# Patient Record
Sex: FEMALE | Race: WHITE | Employment: OTHER | URBAN - METROPOLITAN AREA
[De-identification: names, ages, dates, MRNs, and addresses within clinical notes are randomized per-mention and may not be internally consistent; named-entity substitution may affect disease eponyms.]

---

## 2019-02-25 ENCOUNTER — HOSPITAL ENCOUNTER (EMERGENCY)
Age: 29
Discharge: HOME | End: 2019-02-26

## 2019-02-25 ENCOUNTER — APPOINTMENT (OUTPATIENT)
Dept: GENERAL RADIOLOGY | Age: 29
End: 2019-02-25

## 2019-02-25 DIAGNOSIS — R11.2 NAUSEA AND VOMITING, INTRACTABILITY OF VOMITING NOT SPECIFIED, UNSPECIFIED VOMITING TYPE: ICD-10-CM

## 2019-02-25 DIAGNOSIS — E86.0 DEHYDRATION: ICD-10-CM

## 2019-02-25 DIAGNOSIS — M54.2 NECK PAIN: Primary | ICD-10-CM

## 2019-02-25 DIAGNOSIS — R21 RASH AND OTHER NONSPECIFIC SKIN ERUPTION: ICD-10-CM

## 2019-02-25 LAB
A/G RATIO: 0.9 (ref 1.1–2.2)
ALBUMIN SERPL-MCNC: 3.7 G/DL (ref 3.4–5)
ALP BLD-CCNC: 76 U/L (ref 40–129)
ALT SERPL-CCNC: 8 U/L (ref 10–40)
ANION GAP SERPL CALCULATED.3IONS-SCNC: 16 MMOL/L (ref 3–16)
AST SERPL-CCNC: 14 U/L (ref 15–37)
BILIRUB SERPL-MCNC: 0.7 MG/DL (ref 0–1)
BUN BLDV-MCNC: 18 MG/DL (ref 7–20)
CALCIUM SERPL-MCNC: 8.8 MG/DL (ref 8.3–10.6)
CHLORIDE BLD-SCNC: 99 MMOL/L (ref 99–110)
CO2: 21 MMOL/L (ref 21–32)
CREAT SERPL-MCNC: <0.5 MG/DL (ref 0.6–1.1)
GFR AFRICAN AMERICAN: >60
GFR NON-AFRICAN AMERICAN: >60
GLOBULIN: 4 G/DL
GLUCOSE BLD-MCNC: 97 MG/DL (ref 70–99)
HCG QUALITATIVE: NEGATIVE
HCT VFR BLD CALC: 48.5 % (ref 36–48)
HEMOGLOBIN: 16.2 G/DL (ref 12–16)
LIPASE: 13 U/L (ref 13–60)
MCH RBC QN AUTO: 31.6 PG (ref 26–34)
MCHC RBC AUTO-ENTMCNC: 33.4 G/DL (ref 31–36)
MCV RBC AUTO: 94.6 FL (ref 80–100)
PDW BLD-RTO: 13.7 % (ref 12.4–15.4)
PLATELET # BLD: 228 K/UL (ref 135–450)
PMV BLD AUTO: 9.7 FL (ref 5–10.5)
POTASSIUM SERPL-SCNC: 3.4 MMOL/L (ref 3.5–5.1)
RAPID INFLUENZA  B AGN: NEGATIVE
RAPID INFLUENZA A AGN: NEGATIVE
RBC # BLD: 5.13 M/UL (ref 4–5.2)
SODIUM BLD-SCNC: 136 MMOL/L (ref 136–145)
TOTAL PROTEIN: 7.7 G/DL (ref 6.4–8.2)
WBC # BLD: 14.5 K/UL (ref 4–11)

## 2019-02-25 PROCEDURE — 87804 INFLUENZA ASSAY W/OPTIC: CPT

## 2019-02-25 PROCEDURE — 96375 TX/PRO/DX INJ NEW DRUG ADDON: CPT

## 2019-02-25 PROCEDURE — 6360000002 HC RX W HCPCS: Performed by: PHYSICIAN ASSISTANT

## 2019-02-25 PROCEDURE — 2500000003 HC RX 250 WO HCPCS: Performed by: PHYSICIAN ASSISTANT

## 2019-02-25 PROCEDURE — 84703 CHORIONIC GONADOTROPIN ASSAY: CPT

## 2019-02-25 PROCEDURE — 83690 ASSAY OF LIPASE: CPT

## 2019-02-25 PROCEDURE — 2580000003 HC RX 258: Performed by: PHYSICIAN ASSISTANT

## 2019-02-25 PROCEDURE — 85027 COMPLETE CBC AUTOMATED: CPT

## 2019-02-25 PROCEDURE — 80053 COMPREHEN METABOLIC PANEL: CPT

## 2019-02-25 PROCEDURE — 81001 URINALYSIS AUTO W/SCOPE: CPT

## 2019-02-25 PROCEDURE — 96374 THER/PROPH/DIAG INJ IV PUSH: CPT

## 2019-02-25 PROCEDURE — 71046 X-RAY EXAM CHEST 2 VIEWS: CPT

## 2019-02-25 PROCEDURE — 99283 EMERGENCY DEPT VISIT LOW MDM: CPT

## 2019-02-25 RX ORDER — 0.9 % SODIUM CHLORIDE 0.9 %
1000 INTRAVENOUS SOLUTION INTRAVENOUS ONCE
Status: COMPLETED | OUTPATIENT
Start: 2019-02-25 | End: 2019-02-26

## 2019-02-25 RX ORDER — ONDANSETRON 2 MG/ML
4 INJECTION INTRAMUSCULAR; INTRAVENOUS ONCE
Status: COMPLETED | OUTPATIENT
Start: 2019-02-25 | End: 2019-02-25

## 2019-02-25 RX ORDER — METHYLPREDNISOLONE SODIUM SUCCINATE 125 MG/2ML
125 INJECTION, POWDER, LYOPHILIZED, FOR SOLUTION INTRAMUSCULAR; INTRAVENOUS ONCE
Status: COMPLETED | OUTPATIENT
Start: 2019-02-25 | End: 2019-02-25

## 2019-02-25 RX ADMIN — FAMOTIDINE 20 MG: 10 INJECTION, SOLUTION INTRAVENOUS at 22:00

## 2019-02-25 RX ADMIN — SODIUM CHLORIDE 1000 ML: 9 INJECTION, SOLUTION INTRAVENOUS at 22:00

## 2019-02-25 RX ADMIN — ONDANSETRON 4 MG: 2 INJECTION INTRAMUSCULAR; INTRAVENOUS at 22:00

## 2019-02-25 RX ADMIN — METHYLPREDNISOLONE SODIUM SUCCINATE 125 MG: 125 INJECTION, POWDER, FOR SOLUTION INTRAMUSCULAR; INTRAVENOUS at 22:00

## 2019-02-25 ASSESSMENT — PAIN DESCRIPTION - LOCATION: LOCATION: NECK

## 2019-02-25 ASSESSMENT — PAIN SCALES - GENERAL: PAINLEVEL_OUTOF10: 10

## 2019-02-26 VITALS
SYSTOLIC BLOOD PRESSURE: 116 MMHG | DIASTOLIC BLOOD PRESSURE: 71 MMHG | BODY MASS INDEX: 24.14 KG/M2 | WEIGHT: 115 LBS | RESPIRATION RATE: 14 BRPM | TEMPERATURE: 98.3 F | HEART RATE: 85 BPM | OXYGEN SATURATION: 99 % | HEIGHT: 58 IN

## 2019-02-26 LAB
BACTERIA: ABNORMAL /HPF
BILIRUBIN URINE: ABNORMAL
BLOOD, URINE: ABNORMAL
CLARITY: CLEAR
COLOR: YELLOW
EPITHELIAL CELLS, UA: ABNORMAL /HPF
GLUCOSE URINE: NEGATIVE MG/DL
KETONES, URINE: >=80 MG/DL
LEUKOCYTE ESTERASE, URINE: NEGATIVE
MICROSCOPIC EXAMINATION: YES
MUCUS: ABNORMAL /LPF
NITRITE, URINE: NEGATIVE
PH UA: 5.5
PROTEIN UA: 100 MG/DL
RBC UA: ABNORMAL /HPF (ref 0–2)
SPECIFIC GRAVITY UA: >=1.03
URINE TYPE: ABNORMAL
UROBILINOGEN, URINE: 1 E.U./DL
WBC UA: ABNORMAL /HPF (ref 0–5)

## 2019-02-26 PROCEDURE — 6370000000 HC RX 637 (ALT 250 FOR IP): Performed by: PHYSICIAN ASSISTANT

## 2019-02-26 RX ORDER — OXYCODONE HYDROCHLORIDE AND ACETAMINOPHEN 5; 325 MG/1; MG/1
1 TABLET ORAL EVERY 4 HOURS PRN
Qty: 15 TABLET | Refills: 0 | Status: SHIPPED | OUTPATIENT
Start: 2019-02-26 | End: 2019-03-14 | Stop reason: HOSPADM

## 2019-02-26 RX ORDER — ACYCLOVIR 200 MG/1
800 CAPSULE ORAL
Qty: 200 CAPSULE | Refills: 0 | Status: SHIPPED | OUTPATIENT
Start: 2019-02-26 | End: 2019-03-01

## 2019-02-26 RX ORDER — ONDANSETRON 4 MG/1
4 TABLET, ORALLY DISINTEGRATING ORAL EVERY 8 HOURS PRN
Qty: 20 TABLET | Refills: 0 | Status: SHIPPED | OUTPATIENT
Start: 2019-02-26 | End: 2019-03-15

## 2019-02-26 RX ORDER — ACYCLOVIR 200 MG/1
200 CAPSULE ORAL ONCE
Status: DISCONTINUED | OUTPATIENT
Start: 2019-02-26 | End: 2019-02-26 | Stop reason: HOSPADM

## 2019-02-26 RX ORDER — OXYCODONE HYDROCHLORIDE AND ACETAMINOPHEN 5; 325 MG/1; MG/1
1 TABLET ORAL ONCE
Status: COMPLETED | OUTPATIENT
Start: 2019-02-26 | End: 2019-02-26

## 2019-02-26 RX ADMIN — OXYCODONE AND ACETAMINOPHEN 1 TABLET: 5; 325 TABLET ORAL at 01:28

## 2019-02-26 ASSESSMENT — PAIN SCALES - GENERAL
PAINLEVEL_OUTOF10: 10
PAINLEVEL_OUTOF10: 8

## 2019-03-01 ENCOUNTER — ANESTHESIA (OUTPATIENT)
Dept: OPERATING ROOM | Age: 29
DRG: 853 | End: 2019-03-01
Payer: MEDICARE

## 2019-03-01 ENCOUNTER — ANESTHESIA EVENT (OUTPATIENT)
Dept: OPERATING ROOM | Age: 29
DRG: 853 | End: 2019-03-01
Payer: MEDICARE

## 2019-03-01 ENCOUNTER — HOSPITAL ENCOUNTER (INPATIENT)
Age: 29
LOS: 12 days | Discharge: HOME HEALTH | DRG: 853 | End: 2019-03-14
Attending: EMERGENCY MEDICINE | Admitting: SURGERY
Payer: MEDICARE

## 2019-03-01 ENCOUNTER — APPOINTMENT (OUTPATIENT)
Dept: GENERAL RADIOLOGY | Age: 29
DRG: 853 | End: 2019-03-01
Payer: MEDICARE

## 2019-03-01 ENCOUNTER — APPOINTMENT (OUTPATIENT)
Dept: CT IMAGING | Age: 29
DRG: 853 | End: 2019-03-01
Payer: MEDICARE

## 2019-03-01 DIAGNOSIS — E87.1 HYPONATREMIA: ICD-10-CM

## 2019-03-01 DIAGNOSIS — Z90.49 S/P PARTIAL COLECTOMY: ICD-10-CM

## 2019-03-01 DIAGNOSIS — R19.8 PERFORATED VISCUS: Primary | ICD-10-CM

## 2019-03-01 DIAGNOSIS — R11.2 NAUSEA VOMITING AND DIARRHEA: ICD-10-CM

## 2019-03-01 DIAGNOSIS — E87.6 HYPOKALEMIA: ICD-10-CM

## 2019-03-01 DIAGNOSIS — R19.7 NAUSEA VOMITING AND DIARRHEA: ICD-10-CM

## 2019-03-01 LAB
A/G RATIO: 0.5 (ref 1.1–2.2)
ALBUMIN SERPL-MCNC: 2 G/DL (ref 3.4–5)
ALP BLD-CCNC: 61 U/L (ref 40–129)
ALT SERPL-CCNC: 10 U/L (ref 10–40)
AMORPHOUS: ABNORMAL /HPF
AMPHETAMINE SCREEN, URINE: ABNORMAL
ANION GAP SERPL CALCULATED.3IONS-SCNC: 19 MMOL/L (ref 3–16)
AST SERPL-CCNC: 31 U/L (ref 15–37)
BACTERIA: ABNORMAL /HPF
BARBITURATE SCREEN URINE: ABNORMAL
BASOPHILS ABSOLUTE: 0 K/UL (ref 0–0.2)
BASOPHILS RELATIVE PERCENT: 0.1 %
BENZODIAZEPINE SCREEN, URINE: ABNORMAL
BILIRUB SERPL-MCNC: 0.5 MG/DL (ref 0–1)
BILIRUBIN URINE: ABNORMAL
BLOOD, URINE: ABNORMAL
BUN BLDV-MCNC: 15 MG/DL (ref 7–20)
CALCIUM SERPL-MCNC: 8.3 MG/DL (ref 8.3–10.6)
CANNABINOID SCREEN URINE: POSITIVE
CHLORIDE BLD-SCNC: 80 MMOL/L (ref 99–110)
CLARITY: ABNORMAL
CO2: 20 MMOL/L (ref 21–32)
COCAINE METABOLITE SCREEN URINE: ABNORMAL
COLOR: YELLOW
CREAT SERPL-MCNC: <0.5 MG/DL (ref 0.6–1.1)
EOSINOPHILS ABSOLUTE: 0 K/UL (ref 0–0.6)
EOSINOPHILS RELATIVE PERCENT: 0 %
EPITHELIAL CELLS, UA: ABNORMAL /HPF
GFR AFRICAN AMERICAN: >60
GFR NON-AFRICAN AMERICAN: >60
GLOBULIN: 4.2 G/DL
GLUCOSE BLD-MCNC: 75 MG/DL (ref 70–99)
GLUCOSE URINE: NEGATIVE MG/DL
HCG QUALITATIVE: NEGATIVE
HCT VFR BLD CALC: 39.7 % (ref 36–48)
HEMOGLOBIN: 13.3 G/DL (ref 12–16)
KETONES, URINE: >=80 MG/DL
LEUKOCYTE ESTERASE, URINE: ABNORMAL
LIPASE: 10 U/L (ref 13–60)
LYMPHOCYTES ABSOLUTE: 0.5 K/UL (ref 1–5.1)
LYMPHOCYTES RELATIVE PERCENT: 4.1 %
Lab: ABNORMAL
MAGNESIUM: 1.8 MG/DL (ref 1.8–2.4)
MCH RBC QN AUTO: 30.8 PG (ref 26–34)
MCHC RBC AUTO-ENTMCNC: 33.4 G/DL (ref 31–36)
MCV RBC AUTO: 92.4 FL (ref 80–100)
METHADONE SCREEN, URINE: ABNORMAL
MICROSCOPIC EXAMINATION: YES
MONOCYTES ABSOLUTE: 0.7 K/UL (ref 0–1.3)
MONOCYTES RELATIVE PERCENT: 6.6 %
NEUTROPHILS ABSOLUTE: 9.8 K/UL (ref 1.7–7.7)
NEUTROPHILS RELATIVE PERCENT: 89.2 %
NITRITE, URINE: NEGATIVE
OPIATE SCREEN URINE: ABNORMAL
OXYCODONE URINE: POSITIVE
PDW BLD-RTO: 13.5 % (ref 12.4–15.4)
PH UA: 6.5
PH UA: 6.5
PHENCYCLIDINE SCREEN URINE: ABNORMAL
PLATELET # BLD: 185 K/UL (ref 135–450)
PMV BLD AUTO: 9.2 FL (ref 5–10.5)
POTASSIUM REFLEX MAGNESIUM: 3 MMOL/L (ref 3.5–5.1)
PROPOXYPHENE SCREEN: ABNORMAL
PROTEIN UA: 30 MG/DL
RBC # BLD: 4.3 M/UL (ref 4–5.2)
RBC UA: ABNORMAL /HPF (ref 0–2)
SODIUM BLD-SCNC: 119 MMOL/L (ref 136–145)
SPECIFIC GRAVITY UA: 1.01
TOTAL PROTEIN: 6.2 G/DL (ref 6.4–8.2)
TROPONIN: <0.01 NG/ML
URINE REFLEX TO CULTURE: YES
URINE TYPE: ABNORMAL
UROBILINOGEN, URINE: 0.2 E.U./DL
WBC # BLD: 11 K/UL (ref 4–11)
WBC UA: ABNORMAL /HPF (ref 0–5)

## 2019-03-01 PROCEDURE — 3600000014 HC SURGERY LEVEL 4 ADDTL 15MIN: Performed by: SURGERY

## 2019-03-01 PROCEDURE — 2720000010 HC SURG SUPPLY STERILE: Performed by: SURGERY

## 2019-03-01 PROCEDURE — 6360000002 HC RX W HCPCS: Performed by: NURSE PRACTITIONER

## 2019-03-01 PROCEDURE — 3700000000 HC ANESTHESIA ATTENDED CARE: Performed by: SURGERY

## 2019-03-01 PROCEDURE — 87186 SC STD MICRODIL/AGAR DIL: CPT

## 2019-03-01 PROCEDURE — 2580000003 HC RX 258: Performed by: NURSE PRACTITIONER

## 2019-03-01 PROCEDURE — 6360000002 HC RX W HCPCS: Performed by: EMERGENCY MEDICINE

## 2019-03-01 PROCEDURE — 84703 CHORIONIC GONADOTROPIN ASSAY: CPT

## 2019-03-01 PROCEDURE — 85025 COMPLETE CBC W/AUTO DIFF WBC: CPT

## 2019-03-01 PROCEDURE — 71045 X-RAY EXAM CHEST 1 VIEW: CPT

## 2019-03-01 PROCEDURE — 6360000004 HC RX CONTRAST MEDICATION: Performed by: NURSE PRACTITIONER

## 2019-03-01 PROCEDURE — 87077 CULTURE AEROBIC IDENTIFY: CPT

## 2019-03-01 PROCEDURE — 99291 CRITICAL CARE FIRST HOUR: CPT

## 2019-03-01 PROCEDURE — 74177 CT ABD & PELVIS W/CONTRAST: CPT

## 2019-03-01 PROCEDURE — 3600000004 HC SURGERY LEVEL 4 BASE: Performed by: SURGERY

## 2019-03-01 PROCEDURE — 81001 URINALYSIS AUTO W/SCOPE: CPT

## 2019-03-01 PROCEDURE — 2709999900 HC NON-CHARGEABLE SUPPLY: Performed by: SURGERY

## 2019-03-01 PROCEDURE — 36415 COLL VENOUS BLD VENIPUNCTURE: CPT

## 2019-03-01 PROCEDURE — 83735 ASSAY OF MAGNESIUM: CPT

## 2019-03-01 PROCEDURE — 96361 HYDRATE IV INFUSION ADD-ON: CPT

## 2019-03-01 PROCEDURE — 7100000000 HC PACU RECOVERY - FIRST 15 MIN: Performed by: SURGERY

## 2019-03-01 PROCEDURE — 93005 ELECTROCARDIOGRAM TRACING: CPT | Performed by: NURSE PRACTITIONER

## 2019-03-01 PROCEDURE — 2580000003 HC RX 258: Performed by: EMERGENCY MEDICINE

## 2019-03-01 PROCEDURE — 84484 ASSAY OF TROPONIN QUANT: CPT

## 2019-03-01 PROCEDURE — 87086 URINE CULTURE/COLONY COUNT: CPT

## 2019-03-01 PROCEDURE — 80053 COMPREHEN METABOLIC PANEL: CPT

## 2019-03-01 PROCEDURE — 3700000001 HC ADD 15 MINUTES (ANESTHESIA): Performed by: SURGERY

## 2019-03-01 PROCEDURE — 80307 DRUG TEST PRSMV CHEM ANLYZR: CPT

## 2019-03-01 PROCEDURE — 83690 ASSAY OF LIPASE: CPT

## 2019-03-01 PROCEDURE — 0DBN0ZZ EXCISION OF SIGMOID COLON, OPEN APPROACH: ICD-10-PCS | Performed by: SURGERY

## 2019-03-01 PROCEDURE — 0D1M0Z4 BYPASS DESCENDING COLON TO CUTANEOUS, OPEN APPROACH: ICD-10-PCS | Performed by: SURGERY

## 2019-03-01 PROCEDURE — 96374 THER/PROPH/DIAG INJ IV PUSH: CPT

## 2019-03-01 PROCEDURE — 7100000001 HC PACU RECOVERY - ADDTL 15 MIN: Performed by: SURGERY

## 2019-03-01 RX ORDER — 0.9 % SODIUM CHLORIDE 0.9 %
1000 INTRAVENOUS SOLUTION INTRAVENOUS ONCE
Status: COMPLETED | OUTPATIENT
Start: 2019-03-01 | End: 2019-03-01

## 2019-03-01 RX ORDER — HYDRALAZINE HYDROCHLORIDE 20 MG/ML
5 INJECTION INTRAMUSCULAR; INTRAVENOUS EVERY 30 MIN PRN
Status: DISCONTINUED | OUTPATIENT
Start: 2019-03-01 | End: 2019-03-05 | Stop reason: HOSPADM

## 2019-03-01 RX ORDER — DIPHENHYDRAMINE HYDROCHLORIDE 50 MG/ML
6.25 INJECTION INTRAMUSCULAR; INTRAVENOUS
Status: ACTIVE | OUTPATIENT
Start: 2019-03-01 | End: 2019-03-01

## 2019-03-01 RX ORDER — LABETALOL HYDROCHLORIDE 5 MG/ML
5 INJECTION, SOLUTION INTRAVENOUS
Status: DISCONTINUED | OUTPATIENT
Start: 2019-03-01 | End: 2019-03-05 | Stop reason: HOSPADM

## 2019-03-01 RX ORDER — ONDANSETRON 2 MG/ML
4 INJECTION INTRAMUSCULAR; INTRAVENOUS ONCE
Status: COMPLETED | OUTPATIENT
Start: 2019-03-01 | End: 2019-03-01

## 2019-03-01 RX ORDER — ONDANSETRON 2 MG/ML
4 INJECTION INTRAMUSCULAR; INTRAVENOUS EVERY 30 MIN PRN
Status: DISCONTINUED | OUTPATIENT
Start: 2019-03-01 | End: 2019-03-05 | Stop reason: HOSPADM

## 2019-03-01 RX ORDER — OXYCODONE HYDROCHLORIDE AND ACETAMINOPHEN 5; 325 MG/1; MG/1
2 TABLET ORAL PRN
Status: ACTIVE | OUTPATIENT
Start: 2019-03-01 | End: 2019-03-01

## 2019-03-01 RX ORDER — POTASSIUM CHLORIDE 7.45 MG/ML
10 INJECTION INTRAVENOUS
Status: DISPENSED | OUTPATIENT
Start: 2019-03-01 | End: 2019-03-02

## 2019-03-01 RX ORDER — SODIUM CHLORIDE 9 MG/ML
INJECTION, SOLUTION INTRAVENOUS ONCE
Status: COMPLETED | OUTPATIENT
Start: 2019-03-01 | End: 2019-03-02

## 2019-03-01 RX ORDER — OXYCODONE HYDROCHLORIDE AND ACETAMINOPHEN 5; 325 MG/1; MG/1
1 TABLET ORAL PRN
Status: ACTIVE | OUTPATIENT
Start: 2019-03-01 | End: 2019-03-01

## 2019-03-01 RX ORDER — MEPERIDINE HYDROCHLORIDE 50 MG/ML
12.5 INJECTION INTRAMUSCULAR; INTRAVENOUS; SUBCUTANEOUS EVERY 5 MIN PRN
Status: DISCONTINUED | OUTPATIENT
Start: 2019-03-01 | End: 2019-03-05 | Stop reason: HOSPADM

## 2019-03-01 RX ADMIN — ONDANSETRON 4 MG: 2 INJECTION INTRAMUSCULAR; INTRAVENOUS at 19:58

## 2019-03-01 RX ADMIN — POTASSIUM CHLORIDE 10 MEQ: 7.46 INJECTION, SOLUTION INTRAVENOUS at 21:52

## 2019-03-01 RX ADMIN — PIPERACILLIN AND TAZOBACTAM 3.38 G: 3; .375 INJECTION, POWDER, FOR SOLUTION INTRAVENOUS at 22:15

## 2019-03-01 RX ADMIN — SODIUM CHLORIDE 1000 ML: 9 INJECTION, SOLUTION INTRAVENOUS at 19:58

## 2019-03-01 RX ADMIN — IOPAMIDOL 75 ML: 755 INJECTION, SOLUTION INTRAVENOUS at 21:46

## 2019-03-01 RX ADMIN — SODIUM CHLORIDE 1000 ML: 9 INJECTION, SOLUTION INTRAVENOUS at 21:52

## 2019-03-01 ASSESSMENT — PAIN SCALES - GENERAL: PAINLEVEL_OUTOF10: 7

## 2019-03-01 ASSESSMENT — LIFESTYLE VARIABLES: SMOKING_STATUS: 1

## 2019-03-01 ASSESSMENT — PAIN DESCRIPTION - LOCATION: LOCATION: ABDOMEN;CHEST

## 2019-03-01 ASSESSMENT — PAIN DESCRIPTION - PAIN TYPE: TYPE: ACUTE PAIN

## 2019-03-02 ENCOUNTER — APPOINTMENT (OUTPATIENT)
Dept: INTERVENTIONAL RADIOLOGY/VASCULAR | Age: 29
DRG: 853 | End: 2019-03-02
Payer: MEDICARE

## 2019-03-02 PROBLEM — Z90.49 S/P PARTIAL COLECTOMY: Status: ACTIVE | Noted: 2019-03-02

## 2019-03-02 LAB
ANION GAP SERPL CALCULATED.3IONS-SCNC: 17 MMOL/L (ref 3–16)
BUN BLDV-MCNC: 12 MG/DL (ref 7–20)
CALCIUM SERPL-MCNC: 7.2 MG/DL (ref 8.3–10.6)
CHLORIDE BLD-SCNC: 95 MMOL/L (ref 99–110)
CO2: 17 MMOL/L (ref 21–32)
CREAT SERPL-MCNC: <0.5 MG/DL (ref 0.6–1.1)
EKG ATRIAL RATE: 126 BPM
EKG DIAGNOSIS: NORMAL
EKG P AXIS: 80 DEGREES
EKG P-R INTERVAL: 126 MS
EKG Q-T INTERVAL: 292 MS
EKG QRS DURATION: 88 MS
EKG QTC CALCULATION (BAZETT): 422 MS
EKG R AXIS: 78 DEGREES
EKG T AXIS: 48 DEGREES
EKG VENTRICULAR RATE: 126 BPM
GFR AFRICAN AMERICAN: >60
GFR NON-AFRICAN AMERICAN: >60
GLUCOSE BLD-MCNC: 114 MG/DL (ref 70–99)
HCT VFR BLD CALC: 45.8 % (ref 36–48)
HEMOGLOBIN: 15.7 G/DL (ref 12–16)
MCH RBC QN AUTO: 31.6 PG (ref 26–34)
MCHC RBC AUTO-ENTMCNC: 34.2 G/DL (ref 31–36)
MCV RBC AUTO: 92.3 FL (ref 80–100)
PDW BLD-RTO: 13.8 % (ref 12.4–15.4)
PLATELET # BLD: 178 K/UL (ref 135–450)
PMV BLD AUTO: 9.4 FL (ref 5–10.5)
POTASSIUM SERPL-SCNC: 3.1 MMOL/L (ref 3.5–5.1)
RBC # BLD: 4.96 M/UL (ref 4–5.2)
SODIUM BLD-SCNC: 129 MMOL/L (ref 136–145)
WBC # BLD: 9.2 K/UL (ref 4–11)

## 2019-03-02 PROCEDURE — 6360000002 HC RX W HCPCS: Performed by: SURGERY

## 2019-03-02 PROCEDURE — 80048 BASIC METABOLIC PNL TOTAL CA: CPT

## 2019-03-02 PROCEDURE — 1200000000 HC SEMI PRIVATE

## 2019-03-02 PROCEDURE — 2580000003 HC RX 258: Performed by: NURSE PRACTITIONER

## 2019-03-02 PROCEDURE — 2580000003 HC RX 258: Performed by: SURGERY

## 2019-03-02 PROCEDURE — 44143 PARTIAL REMOVAL OF COLON: CPT | Performed by: SURGERY

## 2019-03-02 PROCEDURE — 6360000002 HC RX W HCPCS: Performed by: ANESTHESIOLOGY

## 2019-03-02 PROCEDURE — 85027 COMPLETE CBC AUTOMATED: CPT

## 2019-03-02 PROCEDURE — 36573 INSJ PICC RS&I 5 YR+: CPT

## 2019-03-02 PROCEDURE — 02HV33Z INSERTION OF INFUSION DEVICE INTO SUPERIOR VENA CAVA, PERCUTANEOUS APPROACH: ICD-10-PCS | Performed by: SURGERY

## 2019-03-02 PROCEDURE — 36415 COLL VENOUS BLD VENIPUNCTURE: CPT

## 2019-03-02 PROCEDURE — 93010 ELECTROCARDIOGRAM REPORT: CPT | Performed by: INTERNAL MEDICINE

## 2019-03-02 PROCEDURE — 96375 TX/PRO/DX INJ NEW DRUG ADDON: CPT

## 2019-03-02 PROCEDURE — 6370000000 HC RX 637 (ALT 250 FOR IP): Performed by: SURGERY

## 2019-03-02 PROCEDURE — 88307 TISSUE EXAM BY PATHOLOGIST: CPT

## 2019-03-02 PROCEDURE — C1751 CATH, INF, PER/CENT/MIDLINE: HCPCS

## 2019-03-02 PROCEDURE — 2500000003 HC RX 250 WO HCPCS: Performed by: SURGERY

## 2019-03-02 PROCEDURE — 99024 POSTOP FOLLOW-UP VISIT: CPT | Performed by: SURGERY

## 2019-03-02 PROCEDURE — 96376 TX/PRO/DX INJ SAME DRUG ADON: CPT

## 2019-03-02 RX ORDER — SODIUM CHLORIDE 9 MG/ML
INJECTION, SOLUTION INTRAVENOUS
Status: DISPENSED
Start: 2019-03-02 | End: 2019-03-03

## 2019-03-02 RX ORDER — SODIUM CHLORIDE 0.9 % (FLUSH) 0.9 %
10 SYRINGE (ML) INJECTION PRN
Status: DISCONTINUED | OUTPATIENT
Start: 2019-03-02 | End: 2019-03-14 | Stop reason: HOSPADM

## 2019-03-02 RX ORDER — LIDOCAINE HYDROCHLORIDE 10 MG/ML
5 INJECTION, SOLUTION INFILTRATION; PERINEURAL ONCE
Status: COMPLETED | OUTPATIENT
Start: 2019-03-02 | End: 2019-03-02

## 2019-03-02 RX ORDER — 0.9 % SODIUM CHLORIDE 0.9 %
1000 INTRAVENOUS SOLUTION INTRAVENOUS ONCE
Status: COMPLETED | OUTPATIENT
Start: 2019-03-02 | End: 2019-03-02

## 2019-03-02 RX ORDER — SODIUM CHLORIDE 9 MG/ML
INJECTION, SOLUTION INTRAVENOUS CONTINUOUS
Status: DISCONTINUED | OUTPATIENT
Start: 2019-03-02 | End: 2019-03-03

## 2019-03-02 RX ORDER — NALOXONE HYDROCHLORIDE 0.4 MG/ML
0.4 INJECTION, SOLUTION INTRAMUSCULAR; INTRAVENOUS; SUBCUTANEOUS PRN
Status: DISCONTINUED | OUTPATIENT
Start: 2019-03-02 | End: 2019-03-02

## 2019-03-02 RX ORDER — POTASSIUM CHLORIDE 7.45 MG/ML
40 INJECTION INTRAVENOUS
Status: COMPLETED | OUTPATIENT
Start: 2019-03-02 | End: 2019-03-02

## 2019-03-02 RX ORDER — SODIUM CHLORIDE 0.9 % (FLUSH) 0.9 %
10 SYRINGE (ML) INJECTION EVERY 12 HOURS SCHEDULED
Status: DISCONTINUED | OUTPATIENT
Start: 2019-03-02 | End: 2019-03-14 | Stop reason: HOSPADM

## 2019-03-02 RX ORDER — HYDROMORPHONE HCL 110MG/55ML
1 PATIENT CONTROLLED ANALGESIA SYRINGE INTRAVENOUS
Status: DISCONTINUED | OUTPATIENT
Start: 2019-03-02 | End: 2019-03-14 | Stop reason: HOSPADM

## 2019-03-02 RX ORDER — FAMOTIDINE 20 MG/1
20 TABLET, FILM COATED ORAL 2 TIMES DAILY
Status: DISCONTINUED | OUTPATIENT
Start: 2019-03-02 | End: 2019-03-14 | Stop reason: HOSPADM

## 2019-03-02 RX ORDER — ACETAMINOPHEN 325 MG/1
650 TABLET ORAL EVERY 6 HOURS PRN
Status: DISCONTINUED | OUTPATIENT
Start: 2019-03-02 | End: 2019-03-14 | Stop reason: HOSPADM

## 2019-03-02 RX ADMIN — SODIUM CHLORIDE 1000 ML: 9 INJECTION, SOLUTION INTRAVENOUS at 14:00

## 2019-03-02 RX ADMIN — PIPERACILLIN AND TAZOBACTAM 3.38 G: 3; .375 INJECTION, POWDER, FOR SOLUTION INTRAVENOUS at 05:33

## 2019-03-02 RX ADMIN — HYDROMORPHONE HYDROCHLORIDE 1 MG: 2 INJECTION, SOLUTION INTRAMUSCULAR; INTRAVENOUS; SUBCUTANEOUS at 09:53

## 2019-03-02 RX ADMIN — POTASSIUM CHLORIDE 40 MEQ: 10 INJECTION, SOLUTION INTRAVENOUS at 13:55

## 2019-03-02 RX ADMIN — ONDANSETRON 4 MG: 2 INJECTION INTRAMUSCULAR; INTRAVENOUS at 02:30

## 2019-03-02 RX ADMIN — HYDROMORPHONE HYDROCHLORIDE 1 MG: 2 INJECTION, SOLUTION INTRAMUSCULAR; INTRAVENOUS; SUBCUTANEOUS at 19:19

## 2019-03-02 RX ADMIN — SODIUM CHLORIDE: 9 INJECTION, SOLUTION INTRAVENOUS at 18:42

## 2019-03-02 RX ADMIN — SODIUM CHLORIDE: 9 INJECTION, SOLUTION INTRAVENOUS at 04:45

## 2019-03-02 RX ADMIN — PIPERACILLIN AND TAZOBACTAM 3.38 G: 3; .375 INJECTION, POWDER, FOR SOLUTION INTRAVENOUS at 21:29

## 2019-03-02 RX ADMIN — ENOXAPARIN SODIUM 40 MG: 40 INJECTION SUBCUTANEOUS at 09:53

## 2019-03-02 RX ADMIN — HYDROMORPHONE HYDROCHLORIDE 1 MG: 2 INJECTION, SOLUTION INTRAMUSCULAR; INTRAVENOUS; SUBCUTANEOUS at 12:28

## 2019-03-02 RX ADMIN — Medication: at 02:50

## 2019-03-02 RX ADMIN — LIDOCAINE HYDROCHLORIDE 2 ML: 10 INJECTION, SOLUTION EPIDURAL; INFILTRATION; INTRACAUDAL; PERINEURAL at 13:29

## 2019-03-02 RX ADMIN — HYDROMORPHONE HYDROCHLORIDE 1 MG: 2 INJECTION, SOLUTION INTRAMUSCULAR; INTRAVENOUS; SUBCUTANEOUS at 21:41

## 2019-03-02 RX ADMIN — PIPERACILLIN AND TAZOBACTAM 3.38 G: 3; .375 INJECTION, POWDER, FOR SOLUTION INTRAVENOUS at 13:54

## 2019-03-02 RX ADMIN — HYDROMORPHONE HYDROCHLORIDE 1 MG: 2 INJECTION, SOLUTION INTRAMUSCULAR; INTRAVENOUS; SUBCUTANEOUS at 17:00

## 2019-03-02 ASSESSMENT — PAIN SCALES - GENERAL
PAINLEVEL_OUTOF10: 7
PAINLEVEL_OUTOF10: 10
PAINLEVEL_OUTOF10: 9
PAINLEVEL_OUTOF10: 10
PAINLEVEL_OUTOF10: 9
PAINLEVEL_OUTOF10: 6
PAINLEVEL_OUTOF10: 8

## 2019-03-02 ASSESSMENT — PAIN DESCRIPTION - PAIN TYPE
TYPE: SURGICAL PAIN
TYPE: SURGICAL PAIN

## 2019-03-02 ASSESSMENT — PAIN DESCRIPTION - LOCATION
LOCATION: ABDOMEN
LOCATION: ABDOMEN

## 2019-03-03 LAB
ANION GAP SERPL CALCULATED.3IONS-SCNC: 9 MMOL/L (ref 3–16)
ANISOCYTOSIS: ABNORMAL
ATYPICAL LYMPHOCYTE RELATIVE PERCENT: 1 % (ref 0–6)
BANDED NEUTROPHILS RELATIVE PERCENT: 52 % (ref 0–7)
BASOPHILS ABSOLUTE: 0 K/UL (ref 0–0.2)
BASOPHILS RELATIVE PERCENT: 0 %
BUN BLDV-MCNC: 11 MG/DL (ref 7–20)
CALCIUM SERPL-MCNC: 5.8 MG/DL (ref 8.3–10.6)
CHLORIDE BLD-SCNC: 101 MMOL/L (ref 99–110)
CO2: 22 MMOL/L (ref 21–32)
CREAT SERPL-MCNC: <0.5 MG/DL (ref 0.6–1.1)
DOHLE BODIES: PRESENT
EOSINOPHILS ABSOLUTE: 0 K/UL (ref 0–0.6)
EOSINOPHILS RELATIVE PERCENT: 0 %
GFR AFRICAN AMERICAN: >60
GFR NON-AFRICAN AMERICAN: >60
GLUCOSE BLD-MCNC: 116 MG/DL (ref 70–99)
HCT VFR BLD CALC: 31.3 % (ref 36–48)
HEMOGLOBIN: 10.7 G/DL (ref 12–16)
HYPOCHROMIA: ABNORMAL
LYMPHOCYTES ABSOLUTE: 0.8 K/UL (ref 1–5.1)
LYMPHOCYTES RELATIVE PERCENT: 6 %
MCH RBC QN AUTO: 31.5 PG (ref 26–34)
MCHC RBC AUTO-ENTMCNC: 34.3 G/DL (ref 31–36)
MCV RBC AUTO: 91.8 FL (ref 80–100)
MONOCYTES ABSOLUTE: 0.6 K/UL (ref 0–1.3)
MONOCYTES RELATIVE PERCENT: 5 %
MYELOCYTE PERCENT: 2 %
NEUTROPHILS ABSOLUTE: 10.6 K/UL (ref 1.7–7.7)
NEUTROPHILS RELATIVE PERCENT: 34 %
PDW BLD-RTO: 14.1 % (ref 12.4–15.4)
PLATELET # BLD: 119 K/UL (ref 135–450)
PMV BLD AUTO: 9 FL (ref 5–10.5)
POIKILOCYTES: ABNORMAL
POTASSIUM SERPL-SCNC: 2.8 MMOL/L (ref 3.5–5.1)
RBC # BLD: 3.41 M/UL (ref 4–5.2)
SODIUM BLD-SCNC: 132 MMOL/L (ref 136–145)
TOXIC GRANULATION: PRESENT
WBC # BLD: 12.1 K/UL (ref 4–11)

## 2019-03-03 PROCEDURE — 85025 COMPLETE CBC W/AUTO DIFF WBC: CPT

## 2019-03-03 PROCEDURE — 2500000003 HC RX 250 WO HCPCS: Performed by: SURGERY

## 2019-03-03 PROCEDURE — 6360000002 HC RX W HCPCS: Performed by: SURGERY

## 2019-03-03 PROCEDURE — 80048 BASIC METABOLIC PNL TOTAL CA: CPT

## 2019-03-03 PROCEDURE — 99024 POSTOP FOLLOW-UP VISIT: CPT | Performed by: SURGERY

## 2019-03-03 PROCEDURE — 2580000003 HC RX 258: Performed by: SURGERY

## 2019-03-03 PROCEDURE — 6370000000 HC RX 637 (ALT 250 FOR IP): Performed by: PEDIATRICS

## 2019-03-03 PROCEDURE — 6370000000 HC RX 637 (ALT 250 FOR IP): Performed by: SURGERY

## 2019-03-03 PROCEDURE — 1200000000 HC SEMI PRIVATE

## 2019-03-03 RX ORDER — POTASSIUM CHLORIDE 29.8 MG/ML
20 INJECTION INTRAVENOUS
Status: COMPLETED | OUTPATIENT
Start: 2019-03-03 | End: 2019-03-03

## 2019-03-03 RX ORDER — SODIUM CHLORIDE AND POTASSIUM CHLORIDE .9; .15 G/100ML; G/100ML
SOLUTION INTRAVENOUS CONTINUOUS
Status: DISCONTINUED | OUTPATIENT
Start: 2019-03-03 | End: 2019-03-05

## 2019-03-03 RX ORDER — 0.9 % SODIUM CHLORIDE 0.9 %
1000 INTRAVENOUS SOLUTION INTRAVENOUS ONCE
Status: COMPLETED | OUTPATIENT
Start: 2019-03-03 | End: 2019-03-03

## 2019-03-03 RX ADMIN — HYDROMORPHONE HYDROCHLORIDE 1 MG: 2 INJECTION, SOLUTION INTRAMUSCULAR; INTRAVENOUS; SUBCUTANEOUS at 12:39

## 2019-03-03 RX ADMIN — Medication 10 ML: at 09:00

## 2019-03-03 RX ADMIN — PIPERACILLIN AND TAZOBACTAM 3.38 G: 3; .375 INJECTION, POWDER, FOR SOLUTION INTRAVENOUS at 13:38

## 2019-03-03 RX ADMIN — HYDROMORPHONE HYDROCHLORIDE 1 MG: 2 INJECTION, SOLUTION INTRAMUSCULAR; INTRAVENOUS; SUBCUTANEOUS at 22:01

## 2019-03-03 RX ADMIN — POTASSIUM CHLORIDE AND SODIUM CHLORIDE: 900; 150 INJECTION, SOLUTION INTRAVENOUS at 22:38

## 2019-03-03 RX ADMIN — POTASSIUM CHLORIDE 20 MEQ: 400 INJECTION, SOLUTION INTRAVENOUS at 13:38

## 2019-03-03 RX ADMIN — ENOXAPARIN SODIUM 40 MG: 40 INJECTION SUBCUTANEOUS at 09:00

## 2019-03-03 RX ADMIN — SODIUM CHLORIDE: 9 INJECTION, SOLUTION INTRAVENOUS at 06:03

## 2019-03-03 RX ADMIN — CHLORASEPTIC 1 SPRAY: 1.5 LIQUID ORAL at 13:38

## 2019-03-03 RX ADMIN — POTASSIUM CHLORIDE 20 MEQ: 400 INJECTION, SOLUTION INTRAVENOUS at 16:50

## 2019-03-03 RX ADMIN — CALCIUM CHLORIDE 1 G: 100 INJECTION, SOLUTION INTRAVENOUS at 09:01

## 2019-03-03 RX ADMIN — PIPERACILLIN AND TAZOBACTAM 3.38 G: 3; .375 INJECTION, POWDER, FOR SOLUTION INTRAVENOUS at 06:01

## 2019-03-03 RX ADMIN — PIPERACILLIN AND TAZOBACTAM 3.38 G: 3; .375 INJECTION, POWDER, FOR SOLUTION INTRAVENOUS at 22:07

## 2019-03-03 RX ADMIN — SODIUM CHLORIDE 1000 ML: 9 INJECTION, SOLUTION INTRAVENOUS at 08:56

## 2019-03-03 RX ADMIN — Medication 10 ML: at 22:12

## 2019-03-03 RX ADMIN — BENZOCAINE: 200 SPRAY DENTAL; ORAL; PERIODONTAL at 09:32

## 2019-03-03 RX ADMIN — POTASSIUM CHLORIDE 20 MEQ: 400 INJECTION, SOLUTION INTRAVENOUS at 08:56

## 2019-03-03 RX ADMIN — HYDROMORPHONE HYDROCHLORIDE 1 MG: 2 INJECTION, SOLUTION INTRAMUSCULAR; INTRAVENOUS; SUBCUTANEOUS at 09:11

## 2019-03-03 RX ADMIN — POTASSIUM CHLORIDE AND SODIUM CHLORIDE: 900; 150 INJECTION, SOLUTION INTRAVENOUS at 13:40

## 2019-03-03 ASSESSMENT — PAIN SCALES - GENERAL
PAINLEVEL_OUTOF10: 10
PAINLEVEL_OUTOF10: 4
PAINLEVEL_OUTOF10: 10
PAINLEVEL_OUTOF10: 10

## 2019-03-03 ASSESSMENT — PAIN DESCRIPTION - PAIN TYPE: TYPE: SURGICAL PAIN

## 2019-03-03 ASSESSMENT — PAIN DESCRIPTION - LOCATION: LOCATION: ABDOMEN

## 2019-03-04 ENCOUNTER — APPOINTMENT (OUTPATIENT)
Dept: GENERAL RADIOLOGY | Age: 29
DRG: 853 | End: 2019-03-04
Payer: MEDICARE

## 2019-03-04 PROBLEM — J96.01 ACUTE HYPOXEMIC RESPIRATORY FAILURE (HCC): Status: ACTIVE | Noted: 2019-03-04

## 2019-03-04 PROBLEM — D74.9 METHEMOGLOBINEMIA: Status: ACTIVE | Noted: 2019-03-04

## 2019-03-04 LAB
ANION GAP SERPL CALCULATED.3IONS-SCNC: 7 MMOL/L (ref 3–16)
ANISOCYTOSIS: ABNORMAL
ATYPICAL LYMPHOCYTE RELATIVE PERCENT: 2 % (ref 0–6)
BANDED NEUTROPHILS RELATIVE PERCENT: 47 % (ref 0–7)
BASE EXCESS ARTERIAL: 4.2 MMOL/L (ref -3–3)
BASE EXCESS ARTERIAL: 8.2 MMOL/L (ref -3–3)
BASOPHILS ABSOLUTE: 0 K/UL (ref 0–0.2)
BASOPHILS RELATIVE PERCENT: 0 %
BUN BLDV-MCNC: 10 MG/DL (ref 7–20)
CALCIUM SERPL-MCNC: 7 MG/DL (ref 8.3–10.6)
CARBOXYHEMOGLOBIN ARTERIAL: 0.3 % (ref 0–1.5)
CARBOXYHEMOGLOBIN ARTERIAL: 0.8 % (ref 0–1.5)
CHLORIDE BLD-SCNC: 102 MMOL/L (ref 99–110)
CO2: 31 MMOL/L (ref 21–32)
CREAT SERPL-MCNC: <0.5 MG/DL (ref 0.6–1.1)
EOSINOPHILS ABSOLUTE: 0 K/UL (ref 0–0.6)
EOSINOPHILS RELATIVE PERCENT: 0 %
GFR AFRICAN AMERICAN: >60
GFR NON-AFRICAN AMERICAN: >60
GLUCOSE BLD-MCNC: 91 MG/DL (ref 70–99)
GLUCOSE BLD-MCNC: 95 MG/DL (ref 70–99)
HCO3 ARTERIAL: 26.8 MMOL/L (ref 21–29)
HCO3 ARTERIAL: 31.7 MMOL/L (ref 21–29)
HCT VFR BLD CALC: 28.3 % (ref 36–48)
HEMOGLOBIN, ART, EXTENDED: 10.4 G/DL (ref 12–16)
HEMOGLOBIN, ART, EXTENDED: 10.7 G/DL (ref 12–16)
HEMOGLOBIN: 9.7 G/DL (ref 12–16)
LYMPHOCYTES ABSOLUTE: 0.9 K/UL (ref 1–5.1)
LYMPHOCYTES RELATIVE PERCENT: 3 %
MCH RBC QN AUTO: 31.1 PG (ref 26–34)
MCHC RBC AUTO-ENTMCNC: 34.4 G/DL (ref 31–36)
MCV RBC AUTO: 90.5 FL (ref 80–100)
METAMYELOCYTES RELATIVE PERCENT: 2 %
METHEMOGLOBIN ARTERIAL: 0.3 %
METHEMOGLOBIN ARTERIAL: 38 %
MONOCYTES ABSOLUTE: 0.4 K/UL (ref 0–1.3)
MONOCYTES RELATIVE PERCENT: 2 %
MYELOCYTE PERCENT: 1 %
NEUTROPHILS ABSOLUTE: 17.4 K/UL (ref 1.7–7.7)
NEUTROPHILS RELATIVE PERCENT: 43 %
O2 CONTENT ARTERIAL: 10 ML/DL
O2 CONTENT ARTERIAL: 16 ML/DL
O2 SAT, ARTERIAL: 98.5 %
O2 SAT, ARTERIAL: 99.3 %
O2 THERAPY: ABNORMAL
O2 THERAPY: ABNORMAL
ORGANISM: ABNORMAL
PCO2 ARTERIAL: 33 MMHG (ref 35–45)
PCO2 ARTERIAL: 39.9 MMHG (ref 35–45)
PDW BLD-RTO: 13.7 % (ref 12.4–15.4)
PERFORMED ON: NORMAL
PH ARTERIAL: 7.52 (ref 7.35–7.45)
PH ARTERIAL: 7.53 (ref 7.35–7.45)
PLATELET # BLD: 152 K/UL (ref 135–450)
PMV BLD AUTO: 8.7 FL (ref 5–10.5)
PO2 ARTERIAL: 384.9 MMHG (ref 75–108)
PO2 ARTERIAL: 479.9 MMHG (ref 75–108)
POTASSIUM SERPL-SCNC: 3.6 MMOL/L (ref 3.5–5.1)
RBC # BLD: 3.13 M/UL (ref 4–5.2)
SODIUM BLD-SCNC: 140 MMOL/L (ref 136–145)
STOMATOCYTES: ABNORMAL
TARGET CELLS: ABNORMAL
TCO2 ARTERIAL: 27.8 MMOL/L
TCO2 ARTERIAL: 32.9 MMOL/L
TOXIC GRANULATION: PRESENT
URINE CULTURE, ROUTINE: ABNORMAL
URINE CULTURE, ROUTINE: ABNORMAL
WBC # BLD: 18.7 K/UL (ref 4–11)

## 2019-03-04 PROCEDURE — 94660 CPAP INITIATION&MGMT: CPT

## 2019-03-04 PROCEDURE — 2500000003 HC RX 250 WO HCPCS: Performed by: INTERNAL MEDICINE

## 2019-03-04 PROCEDURE — 1200000000 HC SEMI PRIVATE

## 2019-03-04 PROCEDURE — 2580000003 HC RX 258: Performed by: INTERNAL MEDICINE

## 2019-03-04 PROCEDURE — 36600 WITHDRAWAL OF ARTERIAL BLOOD: CPT

## 2019-03-04 PROCEDURE — APPNB30 APP NON BILLABLE TIME 0-30 MINS: Performed by: CLINICAL NURSE SPECIALIST

## 2019-03-04 PROCEDURE — 94761 N-INVAS EAR/PLS OXIMETRY MLT: CPT

## 2019-03-04 PROCEDURE — 6370000000 HC RX 637 (ALT 250 FOR IP): Performed by: SURGERY

## 2019-03-04 PROCEDURE — 80048 BASIC METABOLIC PNL TOTAL CA: CPT

## 2019-03-04 PROCEDURE — 74018 RADEX ABDOMEN 1 VIEW: CPT

## 2019-03-04 PROCEDURE — 82803 BLOOD GASES ANY COMBINATION: CPT

## 2019-03-04 PROCEDURE — 2700000000 HC OXYGEN THERAPY PER DAY

## 2019-03-04 PROCEDURE — 99254 IP/OBS CNSLTJ NEW/EST MOD 60: CPT | Performed by: INTERNAL MEDICINE

## 2019-03-04 PROCEDURE — 2580000003 HC RX 258: Performed by: SURGERY

## 2019-03-04 PROCEDURE — 85025 COMPLETE CBC W/AUTO DIFF WBC: CPT

## 2019-03-04 PROCEDURE — 6360000002 HC RX W HCPCS: Performed by: SURGERY

## 2019-03-04 PROCEDURE — 99024 POSTOP FOLLOW-UP VISIT: CPT | Performed by: SURGERY

## 2019-03-04 RX ORDER — 0.9 % SODIUM CHLORIDE 0.9 %
500 INTRAVENOUS SOLUTION INTRAVENOUS ONCE
Status: COMPLETED | OUTPATIENT
Start: 2019-03-04 | End: 2019-03-04

## 2019-03-04 RX ADMIN — PIPERACILLIN AND TAZOBACTAM 3.38 G: 3; .375 INJECTION, POWDER, FOR SOLUTION INTRAVENOUS at 05:56

## 2019-03-04 RX ADMIN — PIPERACILLIN AND TAZOBACTAM 3.38 G: 3; .375 INJECTION, POWDER, FOR SOLUTION INTRAVENOUS at 21:00

## 2019-03-04 RX ADMIN — HYDROMORPHONE HYDROCHLORIDE 1 MG: 2 INJECTION, SOLUTION INTRAMUSCULAR; INTRAVENOUS; SUBCUTANEOUS at 09:17

## 2019-03-04 RX ADMIN — SODIUM CHLORIDE 500 ML: 9 INJECTION, SOLUTION INTRAVENOUS at 00:29

## 2019-03-04 RX ADMIN — FAMOTIDINE 20 MG: 20 TABLET ORAL at 08:50

## 2019-03-04 RX ADMIN — Medication 10 ML: at 21:00

## 2019-03-04 RX ADMIN — PIPERACILLIN AND TAZOBACTAM 3.38 G: 3; .375 INJECTION, POWDER, FOR SOLUTION INTRAVENOUS at 13:26

## 2019-03-04 RX ADMIN — ENOXAPARIN SODIUM 40 MG: 40 INJECTION SUBCUTANEOUS at 08:50

## 2019-03-04 RX ADMIN — HYDROMORPHONE HYDROCHLORIDE 1 MG: 2 INJECTION, SOLUTION INTRAMUSCULAR; INTRAVENOUS; SUBCUTANEOUS at 03:26

## 2019-03-04 RX ADMIN — HYDROMORPHONE HYDROCHLORIDE 1 MG: 2 INJECTION, SOLUTION INTRAMUSCULAR; INTRAVENOUS; SUBCUTANEOUS at 21:07

## 2019-03-04 RX ADMIN — METHYLENE BLUE: 10 INJECTION INTRAVENOUS at 13:45

## 2019-03-04 RX ADMIN — BENZOCAINE: 200 SPRAY DENTAL; ORAL; PERIODONTAL at 11:20

## 2019-03-04 ASSESSMENT — PAIN DESCRIPTION - PAIN TYPE: TYPE: SURGICAL PAIN

## 2019-03-04 ASSESSMENT — PAIN SCALES - GENERAL
PAINLEVEL_OUTOF10: 5
PAINLEVEL_OUTOF10: 5
PAINLEVEL_OUTOF10: 10
PAINLEVEL_OUTOF10: 10
PAINLEVEL_OUTOF10: 4
PAINLEVEL_OUTOF10: 0

## 2019-03-04 ASSESSMENT — PAIN DESCRIPTION - DESCRIPTORS: DESCRIPTORS: ACHING;CONSTANT

## 2019-03-04 ASSESSMENT — PAIN - FUNCTIONAL ASSESSMENT: PAIN_FUNCTIONAL_ASSESSMENT: PREVENTS OR INTERFERES SOME ACTIVE ACTIVITIES AND ADLS

## 2019-03-04 ASSESSMENT — PAIN DESCRIPTION - ONSET: ONSET: ON-GOING

## 2019-03-04 ASSESSMENT — PAIN DESCRIPTION - FREQUENCY: FREQUENCY: CONTINUOUS

## 2019-03-04 ASSESSMENT — PAIN DESCRIPTION - ORIENTATION: ORIENTATION: MID

## 2019-03-04 ASSESSMENT — PAIN DESCRIPTION - LOCATION: LOCATION: ABDOMEN

## 2019-03-04 ASSESSMENT — PAIN DESCRIPTION - PROGRESSION: CLINICAL_PROGRESSION: NOT CHANGED

## 2019-03-05 PROBLEM — T14.90XS POST-TRAUMATIC PARAPLEGIA: Chronic | Status: ACTIVE | Noted: 2019-03-05

## 2019-03-05 PROBLEM — B96.20 E-COLI UTI: Status: ACTIVE | Noted: 2019-03-05

## 2019-03-05 PROBLEM — N39.0 E-COLI UTI: Status: ACTIVE | Noted: 2019-03-05

## 2019-03-05 PROBLEM — G82.20 POST-TRAUMATIC PARAPLEGIA: Chronic | Status: ACTIVE | Noted: 2019-03-05

## 2019-03-05 PROBLEM — K65.1 SUBPHRENIC ABSCESS (HCC): Status: ACTIVE | Noted: 2019-03-05

## 2019-03-05 LAB
ANION GAP SERPL CALCULATED.3IONS-SCNC: 9 MMOL/L (ref 3–16)
BASOPHILS ABSOLUTE: 0 K/UL (ref 0–0.2)
BASOPHILS RELATIVE PERCENT: 0.1 %
BUN BLDV-MCNC: 12 MG/DL (ref 7–20)
CALCIUM SERPL-MCNC: 6.7 MG/DL (ref 8.3–10.6)
CHLORIDE BLD-SCNC: 100 MMOL/L (ref 99–110)
CO2: 27 MMOL/L (ref 21–32)
CREAT SERPL-MCNC: <0.5 MG/DL (ref 0.6–1.1)
EOSINOPHILS ABSOLUTE: 0.2 K/UL (ref 0–0.6)
EOSINOPHILS RELATIVE PERCENT: 0.9 %
GFR AFRICAN AMERICAN: >60
GFR NON-AFRICAN AMERICAN: >60
GLUCOSE BLD-MCNC: 111 MG/DL (ref 70–99)
GLUCOSE BLD-MCNC: 118 MG/DL (ref 70–99)
GLUCOSE BLD-MCNC: 63 MG/DL (ref 70–99)
GLUCOSE BLD-MCNC: 71 MG/DL (ref 70–99)
GLUCOSE BLD-MCNC: 83 MG/DL (ref 70–99)
GLUCOSE BLD-MCNC: 90 MG/DL (ref 70–99)
HCT VFR BLD CALC: 27.3 % (ref 36–48)
HEMOGLOBIN: 9.2 G/DL (ref 12–16)
LYMPHOCYTES ABSOLUTE: 1.1 K/UL (ref 1–5.1)
LYMPHOCYTES RELATIVE PERCENT: 5.7 %
MCH RBC QN AUTO: 30.9 PG (ref 26–34)
MCHC RBC AUTO-ENTMCNC: 33.7 G/DL (ref 31–36)
MCV RBC AUTO: 91.5 FL (ref 80–100)
MONOCYTES ABSOLUTE: 0.7 K/UL (ref 0–1.3)
MONOCYTES RELATIVE PERCENT: 3.5 %
NEUTROPHILS ABSOLUTE: 17.1 K/UL (ref 1.7–7.7)
NEUTROPHILS RELATIVE PERCENT: 89.8 %
PDW BLD-RTO: 14.3 % (ref 12.4–15.4)
PERFORMED ON: ABNORMAL
PERFORMED ON: NORMAL
PERFORMED ON: NORMAL
PLATELET # BLD: 156 K/UL (ref 135–450)
PMV BLD AUTO: 8.6 FL (ref 5–10.5)
POTASSIUM SERPL-SCNC: 3.5 MMOL/L (ref 3.5–5.1)
RBC # BLD: 2.99 M/UL (ref 4–5.2)
SODIUM BLD-SCNC: 136 MMOL/L (ref 136–145)
WBC # BLD: 19 K/UL (ref 4–11)

## 2019-03-05 PROCEDURE — 6370000000 HC RX 637 (ALT 250 FOR IP): Performed by: CLINICAL NURSE SPECIALIST

## 2019-03-05 PROCEDURE — 85025 COMPLETE CBC W/AUTO DIFF WBC: CPT

## 2019-03-05 PROCEDURE — 6360000002 HC RX W HCPCS: Performed by: SURGERY

## 2019-03-05 PROCEDURE — 99233 SBSQ HOSP IP/OBS HIGH 50: CPT | Performed by: INTERNAL MEDICINE

## 2019-03-05 PROCEDURE — APPNB60 APP NON BILLABLE TIME 46-60 MINS: Performed by: CLINICAL NURSE SPECIALIST

## 2019-03-05 PROCEDURE — 6370000000 HC RX 637 (ALT 250 FOR IP): Performed by: SURGERY

## 2019-03-05 PROCEDURE — 99024 POSTOP FOLLOW-UP VISIT: CPT | Performed by: SURGERY

## 2019-03-05 PROCEDURE — APPSS60 APP SPLIT SHARED TIME 46-60 MINUTES: Performed by: CLINICAL NURSE SPECIALIST

## 2019-03-05 PROCEDURE — 36592 COLLECT BLOOD FROM PICC: CPT

## 2019-03-05 PROCEDURE — APPNB30 APP NON BILLABLE TIME 0-30 MINS: Performed by: CLINICAL NURSE SPECIALIST

## 2019-03-05 PROCEDURE — 80048 BASIC METABOLIC PNL TOTAL CA: CPT

## 2019-03-05 PROCEDURE — 1200000000 HC SEMI PRIVATE

## 2019-03-05 PROCEDURE — 2500000003 HC RX 250 WO HCPCS: Performed by: CLINICAL NURSE SPECIALIST

## 2019-03-05 PROCEDURE — 2580000003 HC RX 258: Performed by: SURGERY

## 2019-03-05 PROCEDURE — 2580000003 HC RX 258

## 2019-03-05 RX ORDER — DEXTROSE MONOHYDRATE 25 G/50ML
12.5 INJECTION, SOLUTION INTRAVENOUS PRN
Status: DISCONTINUED | OUTPATIENT
Start: 2019-03-05 | End: 2019-03-14 | Stop reason: HOSPADM

## 2019-03-05 RX ORDER — SODIUM CHLORIDE 9 MG/ML
INJECTION, SOLUTION INTRAVENOUS
Status: COMPLETED
Start: 2019-03-05 | End: 2019-03-05

## 2019-03-05 RX ORDER — DEXTROSE, SODIUM CHLORIDE, AND POTASSIUM CHLORIDE 5; .45; .15 G/100ML; G/100ML; G/100ML
INJECTION INTRAVENOUS CONTINUOUS
Status: DISCONTINUED | OUTPATIENT
Start: 2019-03-05 | End: 2019-03-14 | Stop reason: HOSPADM

## 2019-03-05 RX ORDER — NICOTINE POLACRILEX 4 MG
15 LOZENGE BUCCAL PRN
Status: DISCONTINUED | OUTPATIENT
Start: 2019-03-05 | End: 2019-03-14 | Stop reason: HOSPADM

## 2019-03-05 RX ORDER — DEXTROSE MONOHYDRATE 50 MG/ML
100 INJECTION, SOLUTION INTRAVENOUS PRN
Status: DISCONTINUED | OUTPATIENT
Start: 2019-03-05 | End: 2019-03-14 | Stop reason: HOSPADM

## 2019-03-05 RX ORDER — MAGNESIUM HYDROXIDE 1200 MG/15ML
LIQUID ORAL CONTINUOUS PRN
Status: DISCONTINUED | OUTPATIENT
Start: 2019-03-05 | End: 2019-03-05 | Stop reason: ALTCHOICE

## 2019-03-05 RX ADMIN — HYDROMORPHONE HYDROCHLORIDE 1 MG: 2 INJECTION, SOLUTION INTRAMUSCULAR; INTRAVENOUS; SUBCUTANEOUS at 12:19

## 2019-03-05 RX ADMIN — ENOXAPARIN SODIUM 40 MG: 40 INJECTION SUBCUTANEOUS at 09:07

## 2019-03-05 RX ADMIN — DEXTROSE MONOHYDRATE, SODIUM CHLORIDE, AND POTASSIUM CHLORIDE: 50; 4.5; 1.49 INJECTION, SOLUTION INTRAVENOUS at 11:38

## 2019-03-05 RX ADMIN — PIPERACILLIN AND TAZOBACTAM 3.38 G: 3; .375 INJECTION, POWDER, FOR SOLUTION INTRAVENOUS at 15:00

## 2019-03-05 RX ADMIN — DEXTROSE MONOHYDRATE, SODIUM CHLORIDE, AND POTASSIUM CHLORIDE: 50; 4.5; 1.49 INJECTION, SOLUTION INTRAVENOUS at 11:41

## 2019-03-05 RX ADMIN — POTASSIUM CHLORIDE AND SODIUM CHLORIDE: 900; 150 INJECTION, SOLUTION INTRAVENOUS at 05:05

## 2019-03-05 RX ADMIN — Medication 10 ML: at 21:59

## 2019-03-05 RX ADMIN — HYDROMORPHONE HYDROCHLORIDE 1 MG: 2 INJECTION, SOLUTION INTRAMUSCULAR; INTRAVENOUS; SUBCUTANEOUS at 21:59

## 2019-03-05 RX ADMIN — Medication 10 ML: at 09:07

## 2019-03-05 RX ADMIN — HYDROMORPHONE HYDROCHLORIDE 1 MG: 2 INJECTION, SOLUTION INTRAMUSCULAR; INTRAVENOUS; SUBCUTANEOUS at 07:32

## 2019-03-05 RX ADMIN — PIPERACILLIN AND TAZOBACTAM 3.38 G: 3; .375 INJECTION, POWDER, FOR SOLUTION INTRAVENOUS at 05:05

## 2019-03-05 RX ADMIN — HYDROMORPHONE HYDROCHLORIDE 1 MG: 2 INJECTION, SOLUTION INTRAMUSCULAR; INTRAVENOUS; SUBCUTANEOUS at 17:23

## 2019-03-05 RX ADMIN — SODIUM CHLORIDE, PRESERVATIVE FREE 10 ML: 5 INJECTION INTRAVENOUS at 05:04

## 2019-03-05 RX ADMIN — HYDROMORPHONE HYDROCHLORIDE 1 MG: 2 INJECTION, SOLUTION INTRAMUSCULAR; INTRAVENOUS; SUBCUTANEOUS at 03:26

## 2019-03-05 RX ADMIN — DEXTROSE 15 G: 15 GEL ORAL at 11:38

## 2019-03-05 RX ADMIN — FAMOTIDINE 20 MG: 20 TABLET ORAL at 09:08

## 2019-03-05 RX ADMIN — SODIUM CHLORIDE 500 ML: 9 INJECTION, SOLUTION INTRAVENOUS at 05:06

## 2019-03-05 RX ADMIN — PIPERACILLIN AND TAZOBACTAM 3.38 G: 3; .375 INJECTION, POWDER, FOR SOLUTION INTRAVENOUS at 22:04

## 2019-03-05 RX ADMIN — SODIUM CHLORIDE, PRESERVATIVE FREE 10 ML: 5 INJECTION INTRAVENOUS at 05:05

## 2019-03-05 RX ADMIN — DEXTROSE MONOHYDRATE, SODIUM CHLORIDE, AND POTASSIUM CHLORIDE: 50; 4.5; 1.49 INJECTION, SOLUTION INTRAVENOUS at 22:24

## 2019-03-05 RX ADMIN — FAMOTIDINE 20 MG: 20 TABLET ORAL at 21:59

## 2019-03-05 ASSESSMENT — PAIN SCALES - GENERAL
PAINLEVEL_OUTOF10: 8
PAINLEVEL_OUTOF10: 10
PAINLEVEL_OUTOF10: 7
PAINLEVEL_OUTOF10: 10
PAINLEVEL_OUTOF10: 10
PAINLEVEL_OUTOF10: 6
PAINLEVEL_OUTOF10: 10
PAINLEVEL_OUTOF10: 10
PAINLEVEL_OUTOF10: 8

## 2019-03-05 ASSESSMENT — PAIN DESCRIPTION - PROGRESSION: CLINICAL_PROGRESSION: NOT CHANGED

## 2019-03-05 ASSESSMENT — PAIN DESCRIPTION - ONSET: ONSET: ON-GOING

## 2019-03-05 ASSESSMENT — PAIN DESCRIPTION - FREQUENCY: FREQUENCY: CONTINUOUS

## 2019-03-05 ASSESSMENT — PAIN DESCRIPTION - LOCATION
LOCATION: ABDOMEN

## 2019-03-05 ASSESSMENT — PAIN DESCRIPTION - PAIN TYPE
TYPE: SURGICAL PAIN
TYPE: SURGICAL PAIN

## 2019-03-05 ASSESSMENT — PAIN DESCRIPTION - DESCRIPTORS: DESCRIPTORS: ACHING

## 2019-03-05 ASSESSMENT — PAIN - FUNCTIONAL ASSESSMENT: PAIN_FUNCTIONAL_ASSESSMENT: PREVENTS OR INTERFERES SOME ACTIVE ACTIVITIES AND ADLS

## 2019-03-06 LAB
ALBUMIN SERPL-MCNC: 1.6 G/DL (ref 3.4–5)
ANION GAP SERPL CALCULATED.3IONS-SCNC: 8 MMOL/L (ref 3–16)
BASOPHILS ABSOLUTE: 0.1 K/UL (ref 0–0.2)
BASOPHILS RELATIVE PERCENT: 0.6 %
BUN BLDV-MCNC: 7 MG/DL (ref 7–20)
CALCIUM SERPL-MCNC: 6.8 MG/DL (ref 8.3–10.6)
CHLORIDE BLD-SCNC: 99 MMOL/L (ref 99–110)
CO2: 25 MMOL/L (ref 21–32)
CREAT SERPL-MCNC: <0.5 MG/DL (ref 0.6–1.1)
EOSINOPHILS ABSOLUTE: 0.1 K/UL (ref 0–0.6)
EOSINOPHILS RELATIVE PERCENT: 0.9 %
GFR AFRICAN AMERICAN: >60
GFR NON-AFRICAN AMERICAN: >60
GLUCOSE BLD-MCNC: 103 MG/DL (ref 70–99)
GLUCOSE BLD-MCNC: 113 MG/DL (ref 70–99)
GLUCOSE BLD-MCNC: 117 MG/DL (ref 70–99)
GLUCOSE BLD-MCNC: 122 MG/DL (ref 70–99)
GLUCOSE BLD-MCNC: 122 MG/DL (ref 70–99)
GLUCOSE BLD-MCNC: 145 MG/DL (ref 70–99)
HCT VFR BLD CALC: 24.8 % (ref 36–48)
HEMOGLOBIN: 8.3 G/DL (ref 12–16)
LYMPHOCYTES ABSOLUTE: 1.3 K/UL (ref 1–5.1)
LYMPHOCYTES RELATIVE PERCENT: 8.2 %
MCH RBC QN AUTO: 30.6 PG (ref 26–34)
MCHC RBC AUTO-ENTMCNC: 33.5 G/DL (ref 31–36)
MCV RBC AUTO: 91.4 FL (ref 80–100)
MONOCYTES ABSOLUTE: 0.8 K/UL (ref 0–1.3)
MONOCYTES RELATIVE PERCENT: 4.9 %
NEUTROPHILS ABSOLUTE: 13.9 K/UL (ref 1.7–7.7)
NEUTROPHILS RELATIVE PERCENT: 85.4 %
PDW BLD-RTO: 14.1 % (ref 12.4–15.4)
PERFORMED ON: ABNORMAL
PHOSPHORUS: 1.4 MG/DL (ref 2.5–4.9)
PLATELET # BLD: 176 K/UL (ref 135–450)
PMV BLD AUTO: 8.9 FL (ref 5–10.5)
POTASSIUM SERPL-SCNC: 3.7 MMOL/L (ref 3.5–5.1)
RBC # BLD: 2.71 M/UL (ref 4–5.2)
SODIUM BLD-SCNC: 132 MMOL/L (ref 136–145)
WBC # BLD: 16.3 K/UL (ref 4–11)

## 2019-03-06 PROCEDURE — 6370000000 HC RX 637 (ALT 250 FOR IP): Performed by: SURGERY

## 2019-03-06 PROCEDURE — 6360000002 HC RX W HCPCS: Performed by: SURGERY

## 2019-03-06 PROCEDURE — 2580000003 HC RX 258: Performed by: SURGERY

## 2019-03-06 PROCEDURE — 85025 COMPLETE CBC W/AUTO DIFF WBC: CPT

## 2019-03-06 PROCEDURE — 80069 RENAL FUNCTION PANEL: CPT

## 2019-03-06 PROCEDURE — 2500000003 HC RX 250 WO HCPCS: Performed by: CLINICAL NURSE SPECIALIST

## 2019-03-06 PROCEDURE — 6360000002 HC RX W HCPCS: Performed by: CLINICAL NURSE SPECIALIST

## 2019-03-06 PROCEDURE — APPSS45 APP SPLIT SHARED TIME 31-45 MINUTES: Performed by: CLINICAL NURSE SPECIALIST

## 2019-03-06 PROCEDURE — 99232 SBSQ HOSP IP/OBS MODERATE 35: CPT | Performed by: INTERNAL MEDICINE

## 2019-03-06 PROCEDURE — 99024 POSTOP FOLLOW-UP VISIT: CPT | Performed by: SURGERY

## 2019-03-06 PROCEDURE — 36592 COLLECT BLOOD FROM PICC: CPT

## 2019-03-06 PROCEDURE — 1200000000 HC SEMI PRIVATE

## 2019-03-06 RX ORDER — ONDANSETRON 2 MG/ML
4 INJECTION INTRAMUSCULAR; INTRAVENOUS EVERY 6 HOURS PRN
Status: DISCONTINUED | OUTPATIENT
Start: 2019-03-06 | End: 2019-03-08

## 2019-03-06 RX ADMIN — HYDROMORPHONE HYDROCHLORIDE 1 MG: 2 INJECTION, SOLUTION INTRAMUSCULAR; INTRAVENOUS; SUBCUTANEOUS at 02:42

## 2019-03-06 RX ADMIN — PIPERACILLIN AND TAZOBACTAM 3.38 G: 3; .375 INJECTION, POWDER, FOR SOLUTION INTRAVENOUS at 21:40

## 2019-03-06 RX ADMIN — ACETAMINOPHEN 650 MG: 325 TABLET ORAL at 23:21

## 2019-03-06 RX ADMIN — HYDROMORPHONE HYDROCHLORIDE 1 MG: 2 INJECTION, SOLUTION INTRAMUSCULAR; INTRAVENOUS; SUBCUTANEOUS at 15:30

## 2019-03-06 RX ADMIN — Medication 10 ML: at 21:47

## 2019-03-06 RX ADMIN — HYDROMORPHONE HYDROCHLORIDE 1 MG: 2 INJECTION, SOLUTION INTRAMUSCULAR; INTRAVENOUS; SUBCUTANEOUS at 08:34

## 2019-03-06 RX ADMIN — ENOXAPARIN SODIUM 40 MG: 40 INJECTION SUBCUTANEOUS at 08:35

## 2019-03-06 RX ADMIN — FAMOTIDINE 20 MG: 20 TABLET ORAL at 08:35

## 2019-03-06 RX ADMIN — HYDROMORPHONE HYDROCHLORIDE 1 MG: 2 INJECTION, SOLUTION INTRAMUSCULAR; INTRAVENOUS; SUBCUTANEOUS at 21:40

## 2019-03-06 RX ADMIN — DEXTROSE MONOHYDRATE, SODIUM CHLORIDE, AND POTASSIUM CHLORIDE: 50; 4.5; 1.49 INJECTION, SOLUTION INTRAVENOUS at 21:40

## 2019-03-06 RX ADMIN — ONDANSETRON 4 MG: 2 INJECTION INTRAMUSCULAR; INTRAVENOUS at 21:40

## 2019-03-06 RX ADMIN — Medication 10 ML: at 08:35

## 2019-03-06 RX ADMIN — FAMOTIDINE 20 MG: 20 TABLET ORAL at 21:39

## 2019-03-06 RX ADMIN — ONDANSETRON 4 MG: 2 INJECTION INTRAMUSCULAR; INTRAVENOUS at 15:30

## 2019-03-06 RX ADMIN — ACETAMINOPHEN 650 MG: 325 TABLET ORAL at 08:45

## 2019-03-06 RX ADMIN — PIPERACILLIN AND TAZOBACTAM 3.38 G: 3; .375 INJECTION, POWDER, FOR SOLUTION INTRAVENOUS at 12:34

## 2019-03-06 RX ADMIN — PIPERACILLIN AND TAZOBACTAM 3.38 G: 3; .375 INJECTION, POWDER, FOR SOLUTION INTRAVENOUS at 05:23

## 2019-03-06 ASSESSMENT — PAIN SCALES - GENERAL
PAINLEVEL_OUTOF10: 10
PAINLEVEL_OUTOF10: 9
PAINLEVEL_OUTOF10: 10
PAINLEVEL_OUTOF10: 9
PAINLEVEL_OUTOF10: 9
PAINLEVEL_OUTOF10: 7

## 2019-03-07 ENCOUNTER — APPOINTMENT (OUTPATIENT)
Dept: CT IMAGING | Age: 29
DRG: 853 | End: 2019-03-07
Payer: MEDICARE

## 2019-03-07 LAB
ANION GAP SERPL CALCULATED.3IONS-SCNC: 9 MMOL/L (ref 3–16)
BUN BLDV-MCNC: 4 MG/DL (ref 7–20)
CALCIUM SERPL-MCNC: 7 MG/DL (ref 8.3–10.6)
CHLORIDE BLD-SCNC: 102 MMOL/L (ref 99–110)
CO2: 26 MMOL/L (ref 21–32)
CREAT SERPL-MCNC: <0.5 MG/DL (ref 0.6–1.1)
GFR AFRICAN AMERICAN: >60
GFR NON-AFRICAN AMERICAN: >60
GLUCOSE BLD-MCNC: 112 MG/DL (ref 70–99)
GLUCOSE BLD-MCNC: 112 MG/DL (ref 70–99)
GLUCOSE BLD-MCNC: 115 MG/DL (ref 70–99)
GLUCOSE BLD-MCNC: 116 MG/DL (ref 70–99)
GLUCOSE BLD-MCNC: 127 MG/DL (ref 70–99)
GLUCOSE BLD-MCNC: 135 MG/DL (ref 70–99)
HCT VFR BLD CALC: 22.2 % (ref 36–48)
HEMOGLOBIN: 7.5 G/DL (ref 12–16)
MCH RBC QN AUTO: 31.1 PG (ref 26–34)
MCHC RBC AUTO-ENTMCNC: 33.7 G/DL (ref 31–36)
MCV RBC AUTO: 92.1 FL (ref 80–100)
PDW BLD-RTO: 14.2 % (ref 12.4–15.4)
PERFORMED ON: ABNORMAL
PLATELET # BLD: 194 K/UL (ref 135–450)
PMV BLD AUTO: 9.2 FL (ref 5–10.5)
POTASSIUM SERPL-SCNC: 3.9 MMOL/L (ref 3.5–5.1)
RBC # BLD: 2.41 M/UL (ref 4–5.2)
SODIUM BLD-SCNC: 137 MMOL/L (ref 136–145)
WBC # BLD: 22.1 K/UL (ref 4–11)

## 2019-03-07 PROCEDURE — 1200000000 HC SEMI PRIVATE

## 2019-03-07 PROCEDURE — 80048 BASIC METABOLIC PNL TOTAL CA: CPT

## 2019-03-07 PROCEDURE — 2500000003 HC RX 250 WO HCPCS: Performed by: CLINICAL NURSE SPECIALIST

## 2019-03-07 PROCEDURE — 6360000002 HC RX W HCPCS: Performed by: CLINICAL NURSE SPECIALIST

## 2019-03-07 PROCEDURE — 2580000003 HC RX 258: Performed by: SURGERY

## 2019-03-07 PROCEDURE — 6360000004 HC RX CONTRAST MEDICATION: Performed by: SURGERY

## 2019-03-07 PROCEDURE — 85027 COMPLETE CBC AUTOMATED: CPT

## 2019-03-07 PROCEDURE — 74177 CT ABD & PELVIS W/CONTRAST: CPT

## 2019-03-07 PROCEDURE — 99024 POSTOP FOLLOW-UP VISIT: CPT | Performed by: SURGERY

## 2019-03-07 PROCEDURE — 6360000002 HC RX W HCPCS: Performed by: SURGERY

## 2019-03-07 PROCEDURE — 6370000000 HC RX 637 (ALT 250 FOR IP): Performed by: SURGERY

## 2019-03-07 PROCEDURE — APPNB60 APP NON BILLABLE TIME 46-60 MINS: Performed by: CLINICAL NURSE SPECIALIST

## 2019-03-07 RX ORDER — SODIUM CHLORIDE 9 MG/ML
INJECTION, SOLUTION INTRAVENOUS
Status: DISPENSED
Start: 2019-03-07 | End: 2019-03-08

## 2019-03-07 RX ADMIN — FAMOTIDINE 20 MG: 20 TABLET ORAL at 08:05

## 2019-03-07 RX ADMIN — DEXTROSE MONOHYDRATE, SODIUM CHLORIDE, AND POTASSIUM CHLORIDE: 50; 4.5; 1.49 INJECTION, SOLUTION INTRAVENOUS at 16:59

## 2019-03-07 RX ADMIN — HYDROMORPHONE HYDROCHLORIDE 1 MG: 2 INJECTION, SOLUTION INTRAMUSCULAR; INTRAVENOUS; SUBCUTANEOUS at 16:48

## 2019-03-07 RX ADMIN — IOPAMIDOL 75 ML: 755 INJECTION, SOLUTION INTRAVENOUS at 11:22

## 2019-03-07 RX ADMIN — IOHEXOL 50 ML: 240 INJECTION, SOLUTION INTRATHECAL; INTRAVASCULAR; INTRAVENOUS; ORAL at 08:22

## 2019-03-07 RX ADMIN — HYDROMORPHONE HYDROCHLORIDE 1 MG: 2 INJECTION, SOLUTION INTRAMUSCULAR; INTRAVENOUS; SUBCUTANEOUS at 20:30

## 2019-03-07 RX ADMIN — PIPERACILLIN AND TAZOBACTAM 3.38 G: 3; .375 INJECTION, POWDER, FOR SOLUTION INTRAVENOUS at 12:09

## 2019-03-07 RX ADMIN — PIPERACILLIN AND TAZOBACTAM 3.38 G: 3; .375 INJECTION, POWDER, FOR SOLUTION INTRAVENOUS at 05:51

## 2019-03-07 RX ADMIN — Medication 10 ML: at 20:37

## 2019-03-07 RX ADMIN — ONDANSETRON 4 MG: 2 INJECTION INTRAMUSCULAR; INTRAVENOUS at 16:47

## 2019-03-07 RX ADMIN — Medication 10 ML: at 08:05

## 2019-03-07 RX ADMIN — ACETAMINOPHEN 650 MG: 325 TABLET ORAL at 12:09

## 2019-03-07 RX ADMIN — DEXTROSE MONOHYDRATE, SODIUM CHLORIDE, AND POTASSIUM CHLORIDE: 50; 4.5; 1.49 INJECTION, SOLUTION INTRAVENOUS at 23:52

## 2019-03-07 RX ADMIN — PIPERACILLIN AND TAZOBACTAM 3.38 G: 3; .375 INJECTION, POWDER, FOR SOLUTION INTRAVENOUS at 20:31

## 2019-03-07 RX ADMIN — ENOXAPARIN SODIUM 40 MG: 40 INJECTION SUBCUTANEOUS at 08:05

## 2019-03-07 RX ADMIN — HYDROMORPHONE HYDROCHLORIDE 1 MG: 2 INJECTION, SOLUTION INTRAMUSCULAR; INTRAVENOUS; SUBCUTANEOUS at 23:06

## 2019-03-07 RX ADMIN — HYDROMORPHONE HYDROCHLORIDE 1 MG: 2 INJECTION, SOLUTION INTRAMUSCULAR; INTRAVENOUS; SUBCUTANEOUS at 08:05

## 2019-03-07 RX ADMIN — FAMOTIDINE 20 MG: 20 TABLET ORAL at 20:30

## 2019-03-07 RX ADMIN — ONDANSETRON 4 MG: 2 INJECTION INTRAMUSCULAR; INTRAVENOUS at 08:04

## 2019-03-07 ASSESSMENT — PAIN SCALES - GENERAL
PAINLEVEL_OUTOF10: 6
PAINLEVEL_OUTOF10: 10
PAINLEVEL_OUTOF10: 3
PAINLEVEL_OUTOF10: 10

## 2019-03-07 ASSESSMENT — PAIN DESCRIPTION - LOCATION
LOCATION: ABDOMEN

## 2019-03-07 ASSESSMENT — PAIN DESCRIPTION - PAIN TYPE
TYPE: SURGICAL PAIN

## 2019-03-07 ASSESSMENT — PAIN DESCRIPTION - PROGRESSION: CLINICAL_PROGRESSION: NOT CHANGED

## 2019-03-07 ASSESSMENT — PAIN DESCRIPTION - ONSET: ONSET: ON-GOING

## 2019-03-07 ASSESSMENT — PAIN DESCRIPTION - ORIENTATION: ORIENTATION: MID

## 2019-03-08 ENCOUNTER — APPOINTMENT (OUTPATIENT)
Dept: CT IMAGING | Age: 29
DRG: 853 | End: 2019-03-08
Payer: MEDICARE

## 2019-03-08 PROBLEM — E44.0 MODERATE MALNUTRITION (HCC): Chronic | Status: ACTIVE | Noted: 2019-03-08

## 2019-03-08 LAB
ABO/RH: NORMAL
ANION GAP SERPL CALCULATED.3IONS-SCNC: 7 MMOL/L (ref 3–16)
ANTIBODY SCREEN: NORMAL
BLOOD BANK DISPENSE STATUS: NORMAL
BLOOD BANK PRODUCT CODE: NORMAL
BPU ID: NORMAL
BUN BLDV-MCNC: 3 MG/DL (ref 7–20)
CALCIUM SERPL-MCNC: 7.1 MG/DL (ref 8.3–10.6)
CHLORIDE BLD-SCNC: 101 MMOL/L (ref 99–110)
CO2: 24 MMOL/L (ref 21–32)
CREAT SERPL-MCNC: <0.5 MG/DL (ref 0.6–1.1)
DESCRIPTION BLOOD BANK: NORMAL
GFR AFRICAN AMERICAN: >60
GFR NON-AFRICAN AMERICAN: >60
GLUCOSE BLD-MCNC: 106 MG/DL (ref 70–99)
GLUCOSE BLD-MCNC: 111 MG/DL (ref 70–99)
GLUCOSE BLD-MCNC: 112 MG/DL (ref 70–99)
GLUCOSE BLD-MCNC: 132 MG/DL (ref 70–99)
GLUCOSE BLD-MCNC: 96 MG/DL (ref 70–99)
GLUCOSE BLD-MCNC: 97 MG/DL (ref 70–99)
HCT VFR BLD CALC: 20.2 % (ref 36–48)
HEMOGLOBIN: 6.9 G/DL (ref 12–16)
INR BLD: 1.56 (ref 0.86–1.14)
MCH RBC QN AUTO: 31.5 PG (ref 26–34)
MCHC RBC AUTO-ENTMCNC: 34.2 G/DL (ref 31–36)
MCV RBC AUTO: 92.1 FL (ref 80–100)
PDW BLD-RTO: 14.3 % (ref 12.4–15.4)
PERFORMED ON: ABNORMAL
PERFORMED ON: NORMAL
PERFORMED ON: NORMAL
PLATELET # BLD: 209 K/UL (ref 135–450)
PMV BLD AUTO: 9.3 FL (ref 5–10.5)
POTASSIUM SERPL-SCNC: 3.7 MMOL/L (ref 3.5–5.1)
PROTHROMBIN TIME: 17.8 SEC (ref 9.8–13)
RBC # BLD: 2.19 M/UL (ref 4–5.2)
SODIUM BLD-SCNC: 132 MMOL/L (ref 136–145)
WBC # BLD: 20 K/UL (ref 4–11)

## 2019-03-08 PROCEDURE — 6360000002 HC RX W HCPCS: Performed by: SURGERY

## 2019-03-08 PROCEDURE — 0W9G30Z DRAINAGE OF PERITONEAL CAVITY WITH DRAINAGE DEVICE, PERCUTANEOUS APPROACH: ICD-10-PCS | Performed by: RADIOLOGY

## 2019-03-08 PROCEDURE — 6360000002 HC RX W HCPCS: Performed by: RADIOLOGY

## 2019-03-08 PROCEDURE — 2709999900 CT DRAINAGE VISCERAL PERCUTANEOUS

## 2019-03-08 PROCEDURE — 87205 SMEAR GRAM STAIN: CPT

## 2019-03-08 PROCEDURE — 85610 PROTHROMBIN TIME: CPT

## 2019-03-08 PROCEDURE — 87070 CULTURE OTHR SPECIMN AEROBIC: CPT

## 2019-03-08 PROCEDURE — 80048 BASIC METABOLIC PNL TOTAL CA: CPT

## 2019-03-08 PROCEDURE — 2580000003 HC RX 258: Performed by: SURGERY

## 2019-03-08 PROCEDURE — 86900 BLOOD TYPING SEROLOGIC ABO: CPT

## 2019-03-08 PROCEDURE — 87077 CULTURE AEROBIC IDENTIFY: CPT

## 2019-03-08 PROCEDURE — 2500000003 HC RX 250 WO HCPCS: Performed by: CLINICAL NURSE SPECIALIST

## 2019-03-08 PROCEDURE — 6370000000 HC RX 637 (ALT 250 FOR IP): Performed by: SURGERY

## 2019-03-08 PROCEDURE — 86923 COMPATIBILITY TEST ELECTRIC: CPT

## 2019-03-08 PROCEDURE — 1200000000 HC SEMI PRIVATE

## 2019-03-08 PROCEDURE — APPSS60 APP SPLIT SHARED TIME 46-60 MINUTES: Performed by: CLINICAL NURSE SPECIALIST

## 2019-03-08 PROCEDURE — 36430 TRANSFUSION BLD/BLD COMPNT: CPT

## 2019-03-08 PROCEDURE — 6360000002 HC RX W HCPCS: Performed by: CLINICAL NURSE SPECIALIST

## 2019-03-08 PROCEDURE — 85027 COMPLETE CBC AUTOMATED: CPT

## 2019-03-08 PROCEDURE — 87186 SC STD MICRODIL/AGAR DIL: CPT

## 2019-03-08 PROCEDURE — 86901 BLOOD TYPING SEROLOGIC RH(D): CPT

## 2019-03-08 PROCEDURE — 2700000000 HC OXYGEN THERAPY PER DAY

## 2019-03-08 PROCEDURE — P9016 RBC LEUKOCYTES REDUCED: HCPCS

## 2019-03-08 PROCEDURE — 94761 N-INVAS EAR/PLS OXIMETRY MLT: CPT

## 2019-03-08 PROCEDURE — 99024 POSTOP FOLLOW-UP VISIT: CPT | Performed by: SURGERY

## 2019-03-08 PROCEDURE — 86850 RBC ANTIBODY SCREEN: CPT

## 2019-03-08 RX ORDER — FENTANYL CITRATE 50 UG/ML
INJECTION, SOLUTION INTRAMUSCULAR; INTRAVENOUS
Status: COMPLETED | OUTPATIENT
Start: 2019-03-08 | End: 2019-03-08

## 2019-03-08 RX ORDER — MIDAZOLAM HYDROCHLORIDE 5 MG/ML
INJECTION INTRAMUSCULAR; INTRAVENOUS
Status: COMPLETED | OUTPATIENT
Start: 2019-03-08 | End: 2019-03-08

## 2019-03-08 RX ORDER — SODIUM CHLORIDE 9 MG/ML
INJECTION, SOLUTION INTRAVENOUS
Status: DISPENSED
Start: 2019-03-08 | End: 2019-03-08

## 2019-03-08 RX ORDER — 0.9 % SODIUM CHLORIDE 0.9 %
250 INTRAVENOUS SOLUTION INTRAVENOUS ONCE
Status: COMPLETED | OUTPATIENT
Start: 2019-03-08 | End: 2019-03-08

## 2019-03-08 RX ORDER — ONDANSETRON 2 MG/ML
4 INJECTION INTRAMUSCULAR; INTRAVENOUS EVERY 4 HOURS PRN
Status: DISCONTINUED | OUTPATIENT
Start: 2019-03-08 | End: 2019-03-14 | Stop reason: HOSPADM

## 2019-03-08 RX ADMIN — HYDROMORPHONE HYDROCHLORIDE 1 MG: 2 INJECTION, SOLUTION INTRAMUSCULAR; INTRAVENOUS; SUBCUTANEOUS at 10:30

## 2019-03-08 RX ADMIN — HYDROMORPHONE HYDROCHLORIDE 1 MG: 2 INJECTION, SOLUTION INTRAMUSCULAR; INTRAVENOUS; SUBCUTANEOUS at 15:20

## 2019-03-08 RX ADMIN — FENTANYL CITRATE 50 MCG: 50 INJECTION INTRAMUSCULAR; INTRAVENOUS at 13:38

## 2019-03-08 RX ADMIN — HYDROMORPHONE HYDROCHLORIDE 1 MG: 2 INJECTION, SOLUTION INTRAMUSCULAR; INTRAVENOUS; SUBCUTANEOUS at 21:18

## 2019-03-08 RX ADMIN — DEXTROSE MONOHYDRATE, SODIUM CHLORIDE, AND POTASSIUM CHLORIDE: 50; 4.5; 1.49 INJECTION, SOLUTION INTRAVENOUS at 21:15

## 2019-03-08 RX ADMIN — SODIUM CHLORIDE 250 ML: 9 INJECTION, SOLUTION INTRAVENOUS at 08:20

## 2019-03-08 RX ADMIN — FENTANYL CITRATE 50 MCG: 50 INJECTION INTRAMUSCULAR; INTRAVENOUS at 13:58

## 2019-03-08 RX ADMIN — FAMOTIDINE 20 MG: 20 TABLET ORAL at 21:14

## 2019-03-08 RX ADMIN — ONDANSETRON 4 MG: 2 INJECTION INTRAMUSCULAR; INTRAVENOUS at 05:56

## 2019-03-08 RX ADMIN — HYDROMORPHONE HYDROCHLORIDE 1 MG: 2 INJECTION, SOLUTION INTRAMUSCULAR; INTRAVENOUS; SUBCUTANEOUS at 05:56

## 2019-03-08 RX ADMIN — FAMOTIDINE 20 MG: 20 TABLET ORAL at 08:25

## 2019-03-08 RX ADMIN — MIDAZOLAM HYDROCHLORIDE 1 MG: 5 INJECTION, SOLUTION INTRAMUSCULAR; INTRAVENOUS at 13:58

## 2019-03-08 RX ADMIN — PIPERACILLIN AND TAZOBACTAM 3.38 G: 3; .375 INJECTION, POWDER, FOR SOLUTION INTRAVENOUS at 15:20

## 2019-03-08 RX ADMIN — Medication 10 ML: at 21:14

## 2019-03-08 RX ADMIN — ONDANSETRON 4 MG: 2 INJECTION INTRAMUSCULAR; INTRAVENOUS at 11:55

## 2019-03-08 RX ADMIN — SODIUM CHLORIDE, PRESERVATIVE FREE 10 ML: 5 INJECTION INTRAVENOUS at 10:30

## 2019-03-08 RX ADMIN — HYDROMORPHONE HYDROCHLORIDE 1 MG: 2 INJECTION, SOLUTION INTRAMUSCULAR; INTRAVENOUS; SUBCUTANEOUS at 02:17

## 2019-03-08 RX ADMIN — PIPERACILLIN AND TAZOBACTAM 3.38 G: 3; .375 INJECTION, POWDER, FOR SOLUTION INTRAVENOUS at 05:46

## 2019-03-08 RX ADMIN — MIDAZOLAM HYDROCHLORIDE 1 MG: 5 INJECTION, SOLUTION INTRAMUSCULAR; INTRAVENOUS at 13:38

## 2019-03-08 RX ADMIN — Medication 10 ML: at 08:25

## 2019-03-08 RX ADMIN — ONDANSETRON 4 MG: 2 INJECTION INTRAMUSCULAR; INTRAVENOUS at 15:20

## 2019-03-08 RX ADMIN — ACETAMINOPHEN 650 MG: 325 TABLET ORAL at 18:33

## 2019-03-08 RX ADMIN — PIPERACILLIN AND TAZOBACTAM 3.38 G: 3; .375 INJECTION, POWDER, FOR SOLUTION INTRAVENOUS at 21:15

## 2019-03-08 ASSESSMENT — PAIN SCALES - GENERAL
PAINLEVEL_OUTOF10: 7
PAINLEVEL_OUTOF10: 6
PAINLEVEL_OUTOF10: 7
PAINLEVEL_OUTOF10: 4
PAINLEVEL_OUTOF10: 7
PAINLEVEL_OUTOF10: 7
PAINLEVEL_OUTOF10: 10
PAINLEVEL_OUTOF10: 9
PAINLEVEL_OUTOF10: 6
PAINLEVEL_OUTOF10: 0

## 2019-03-08 ASSESSMENT — PAIN DESCRIPTION - LOCATION
LOCATION: ABDOMEN
LOCATION: ABDOMEN

## 2019-03-08 ASSESSMENT — PAIN DESCRIPTION - PROGRESSION: CLINICAL_PROGRESSION: NOT CHANGED

## 2019-03-08 ASSESSMENT — PAIN DESCRIPTION - ONSET: ONSET: ON-GOING

## 2019-03-08 ASSESSMENT — PAIN DESCRIPTION - FREQUENCY: FREQUENCY: CONTINUOUS

## 2019-03-08 ASSESSMENT — PAIN DESCRIPTION - PAIN TYPE
TYPE: ACUTE PAIN;SURGICAL PAIN
TYPE: SURGICAL PAIN

## 2019-03-08 ASSESSMENT — PAIN - FUNCTIONAL ASSESSMENT: PAIN_FUNCTIONAL_ASSESSMENT: PREVENTS OR INTERFERES SOME ACTIVE ACTIVITIES AND ADLS

## 2019-03-08 ASSESSMENT — PAIN DESCRIPTION - DESCRIPTORS: DESCRIPTORS: ACHING;CONSTANT

## 2019-03-09 LAB
ANION GAP SERPL CALCULATED.3IONS-SCNC: 8 MMOL/L (ref 3–16)
ATYPICAL LYMPHOCYTE RELATIVE PERCENT: 1 % (ref 0–6)
BANDED NEUTROPHILS RELATIVE PERCENT: 46 % (ref 0–7)
BASOPHILS ABSOLUTE: 0 K/UL (ref 0–0.2)
BASOPHILS RELATIVE PERCENT: 0 %
BUN BLDV-MCNC: 3 MG/DL (ref 7–20)
CALCIUM SERPL-MCNC: 7.3 MG/DL (ref 8.3–10.6)
CHLORIDE BLD-SCNC: 105 MMOL/L (ref 99–110)
CO2: 22 MMOL/L (ref 21–32)
CREAT SERPL-MCNC: <0.5 MG/DL (ref 0.6–1.1)
DOHLE BODIES: PRESENT
EOSINOPHILS ABSOLUTE: 0 K/UL (ref 0–0.6)
EOSINOPHILS RELATIVE PERCENT: 0 %
GFR AFRICAN AMERICAN: >60
GFR NON-AFRICAN AMERICAN: >60
GLUCOSE BLD-MCNC: 120 MG/DL (ref 70–99)
GLUCOSE BLD-MCNC: 91 MG/DL (ref 70–99)
HCT VFR BLD CALC: 24.1 % (ref 36–48)
HEMOGLOBIN: 8.3 G/DL (ref 12–16)
HYPOCHROMIA: ABNORMAL
LYMPHOCYTES ABSOLUTE: 1.7 K/UL (ref 1–5.1)
LYMPHOCYTES RELATIVE PERCENT: 10 %
MCH RBC QN AUTO: 31.4 PG (ref 26–34)
MCHC RBC AUTO-ENTMCNC: 34.4 G/DL (ref 31–36)
MCV RBC AUTO: 91.3 FL (ref 80–100)
METAMYELOCYTES RELATIVE PERCENT: 3 %
MONOCYTES ABSOLUTE: 0.5 K/UL (ref 0–1.3)
MONOCYTES RELATIVE PERCENT: 3 %
MYELOCYTE PERCENT: 1 %
NEUTROPHILS ABSOLUTE: 13.4 K/UL (ref 1.7–7.7)
NEUTROPHILS RELATIVE PERCENT: 36 %
PDW BLD-RTO: 14.5 % (ref 12.4–15.4)
PERFORMED ON: ABNORMAL
PLATELET # BLD: 252 K/UL (ref 135–450)
PMV BLD AUTO: 9.5 FL (ref 5–10.5)
POIKILOCYTES: ABNORMAL
POTASSIUM SERPL-SCNC: 4.2 MMOL/L (ref 3.5–5.1)
RBC # BLD: 2.64 M/UL (ref 4–5.2)
SODIUM BLD-SCNC: 135 MMOL/L (ref 136–145)
TOXIC GRANULATION: PRESENT
WBC # BLD: 15.6 K/UL (ref 4–11)

## 2019-03-09 PROCEDURE — 2580000003 HC RX 258: Performed by: SURGERY

## 2019-03-09 PROCEDURE — 99024 POSTOP FOLLOW-UP VISIT: CPT | Performed by: SURGERY

## 2019-03-09 PROCEDURE — 6370000000 HC RX 637 (ALT 250 FOR IP): Performed by: SURGERY

## 2019-03-09 PROCEDURE — 80048 BASIC METABOLIC PNL TOTAL CA: CPT

## 2019-03-09 PROCEDURE — 6360000002 HC RX W HCPCS: Performed by: SURGERY

## 2019-03-09 PROCEDURE — 85025 COMPLETE CBC W/AUTO DIFF WBC: CPT

## 2019-03-09 PROCEDURE — 1200000000 HC SEMI PRIVATE

## 2019-03-09 PROCEDURE — 2500000003 HC RX 250 WO HCPCS: Performed by: CLINICAL NURSE SPECIALIST

## 2019-03-09 RX ADMIN — PIPERACILLIN AND TAZOBACTAM 3.38 G: 3; .375 INJECTION, POWDER, FOR SOLUTION INTRAVENOUS at 04:59

## 2019-03-09 RX ADMIN — HYDROMORPHONE HYDROCHLORIDE 1 MG: 2 INJECTION, SOLUTION INTRAMUSCULAR; INTRAVENOUS; SUBCUTANEOUS at 21:26

## 2019-03-09 RX ADMIN — SODIUM CHLORIDE, PRESERVATIVE FREE 10 ML: 5 INJECTION INTRAVENOUS at 19:22

## 2019-03-09 RX ADMIN — FAMOTIDINE 20 MG: 20 TABLET ORAL at 20:30

## 2019-03-09 RX ADMIN — DEXTROSE MONOHYDRATE, SODIUM CHLORIDE, AND POTASSIUM CHLORIDE: 50; 4.5; 1.49 INJECTION, SOLUTION INTRAVENOUS at 21:26

## 2019-03-09 RX ADMIN — SODIUM CHLORIDE, PRESERVATIVE FREE 10 ML: 5 INJECTION INTRAVENOUS at 15:26

## 2019-03-09 RX ADMIN — ONDANSETRON 4 MG: 2 INJECTION INTRAMUSCULAR; INTRAVENOUS at 15:34

## 2019-03-09 RX ADMIN — HYDROMORPHONE HYDROCHLORIDE 1 MG: 2 INJECTION, SOLUTION INTRAMUSCULAR; INTRAVENOUS; SUBCUTANEOUS at 08:30

## 2019-03-09 RX ADMIN — PIPERACILLIN AND TAZOBACTAM 3.38 G: 3; .375 INJECTION, POWDER, FOR SOLUTION INTRAVENOUS at 23:10

## 2019-03-09 RX ADMIN — HYDROMORPHONE HYDROCHLORIDE 1 MG: 2 INJECTION, SOLUTION INTRAMUSCULAR; INTRAVENOUS; SUBCUTANEOUS at 15:34

## 2019-03-09 RX ADMIN — HYDROMORPHONE HYDROCHLORIDE 1 MG: 2 INJECTION, SOLUTION INTRAMUSCULAR; INTRAVENOUS; SUBCUTANEOUS at 19:21

## 2019-03-09 RX ADMIN — SODIUM CHLORIDE, PRESERVATIVE FREE 10 ML: 5 INJECTION INTRAVENOUS at 12:01

## 2019-03-09 RX ADMIN — HYDROMORPHONE HYDROCHLORIDE 1 MG: 2 INJECTION, SOLUTION INTRAMUSCULAR; INTRAVENOUS; SUBCUTANEOUS at 12:00

## 2019-03-09 RX ADMIN — HYDROMORPHONE HYDROCHLORIDE 1 MG: 2 INJECTION, SOLUTION INTRAMUSCULAR; INTRAVENOUS; SUBCUTANEOUS at 01:42

## 2019-03-09 RX ADMIN — FAMOTIDINE 20 MG: 20 TABLET ORAL at 08:30

## 2019-03-09 RX ADMIN — Medication 10 ML: at 08:30

## 2019-03-09 RX ADMIN — PIPERACILLIN AND TAZOBACTAM 3.38 G: 3; .375 INJECTION, POWDER, FOR SOLUTION INTRAVENOUS at 15:26

## 2019-03-09 RX ADMIN — HYDROMORPHONE HYDROCHLORIDE 1 MG: 2 INJECTION, SOLUTION INTRAMUSCULAR; INTRAVENOUS; SUBCUTANEOUS at 23:38

## 2019-03-09 RX ADMIN — HYDROMORPHONE HYDROCHLORIDE 1 MG: 2 INJECTION, SOLUTION INTRAMUSCULAR; INTRAVENOUS; SUBCUTANEOUS at 04:59

## 2019-03-09 ASSESSMENT — PAIN DESCRIPTION - FREQUENCY
FREQUENCY: CONTINUOUS
FREQUENCY: CONTINUOUS

## 2019-03-09 ASSESSMENT — PAIN DESCRIPTION - PAIN TYPE
TYPE: SURGICAL PAIN;ACUTE PAIN
TYPE: ACUTE PAIN;SURGICAL PAIN

## 2019-03-09 ASSESSMENT — PAIN SCALES - GENERAL
PAINLEVEL_OUTOF10: 9
PAINLEVEL_OUTOF10: 7
PAINLEVEL_OUTOF10: 10
PAINLEVEL_OUTOF10: 9
PAINLEVEL_OUTOF10: 10

## 2019-03-09 ASSESSMENT — PAIN DESCRIPTION - DESCRIPTORS
DESCRIPTORS: ACHING
DESCRIPTORS: ACHING;CONSTANT

## 2019-03-09 ASSESSMENT — PAIN - FUNCTIONAL ASSESSMENT
PAIN_FUNCTIONAL_ASSESSMENT: PREVENTS OR INTERFERES SOME ACTIVE ACTIVITIES AND ADLS
PAIN_FUNCTIONAL_ASSESSMENT: PREVENTS OR INTERFERES SOME ACTIVE ACTIVITIES AND ADLS

## 2019-03-09 ASSESSMENT — PAIN DESCRIPTION - ONSET
ONSET: ON-GOING
ONSET: ON-GOING

## 2019-03-09 ASSESSMENT — PAIN DESCRIPTION - LOCATION
LOCATION: ABDOMEN
LOCATION: ABDOMEN

## 2019-03-09 ASSESSMENT — PAIN DESCRIPTION - PROGRESSION
CLINICAL_PROGRESSION: NOT CHANGED
CLINICAL_PROGRESSION: NOT CHANGED

## 2019-03-10 LAB
BODY FLUID CULTURE, STERILE: ABNORMAL
GRAM STAIN RESULT: ABNORMAL
GRAM STAIN RESULT: ABNORMAL
ORGANISM: ABNORMAL
ORGANISM: ABNORMAL

## 2019-03-10 PROCEDURE — 6360000002 HC RX W HCPCS: Performed by: SURGERY

## 2019-03-10 PROCEDURE — 1200000000 HC SEMI PRIVATE

## 2019-03-10 PROCEDURE — 6370000000 HC RX 637 (ALT 250 FOR IP): Performed by: SURGERY

## 2019-03-10 PROCEDURE — 2580000003 HC RX 258: Performed by: SURGERY

## 2019-03-10 PROCEDURE — 99024 POSTOP FOLLOW-UP VISIT: CPT | Performed by: SURGERY

## 2019-03-10 PROCEDURE — 2500000003 HC RX 250 WO HCPCS: Performed by: CLINICAL NURSE SPECIALIST

## 2019-03-10 RX ADMIN — FAMOTIDINE 20 MG: 20 TABLET ORAL at 21:09

## 2019-03-10 RX ADMIN — PIPERACILLIN AND TAZOBACTAM 3.38 G: 3; .375 INJECTION, POWDER, FOR SOLUTION INTRAVENOUS at 15:25

## 2019-03-10 RX ADMIN — Medication 10 ML: at 08:44

## 2019-03-10 RX ADMIN — SODIUM CHLORIDE, PRESERVATIVE FREE 10 ML: 5 INJECTION INTRAVENOUS at 18:44

## 2019-03-10 RX ADMIN — FAMOTIDINE 20 MG: 20 TABLET ORAL at 08:44

## 2019-03-10 RX ADMIN — Medication 10 ML: at 21:09

## 2019-03-10 RX ADMIN — HYDROMORPHONE HYDROCHLORIDE 1 MG: 2 INJECTION, SOLUTION INTRAMUSCULAR; INTRAVENOUS; SUBCUTANEOUS at 21:12

## 2019-03-10 RX ADMIN — HYDROMORPHONE HYDROCHLORIDE 1 MG: 2 INJECTION, SOLUTION INTRAMUSCULAR; INTRAVENOUS; SUBCUTANEOUS at 18:43

## 2019-03-10 RX ADMIN — HYDROMORPHONE HYDROCHLORIDE 1 MG: 2 INJECTION, SOLUTION INTRAMUSCULAR; INTRAVENOUS; SUBCUTANEOUS at 23:25

## 2019-03-10 RX ADMIN — SODIUM CHLORIDE, PRESERVATIVE FREE 10 ML: 5 INJECTION INTRAVENOUS at 11:39

## 2019-03-10 RX ADMIN — HYDROMORPHONE HYDROCHLORIDE 1 MG: 2 INJECTION, SOLUTION INTRAMUSCULAR; INTRAVENOUS; SUBCUTANEOUS at 04:44

## 2019-03-10 RX ADMIN — HYDROMORPHONE HYDROCHLORIDE 1 MG: 2 INJECTION, SOLUTION INTRAMUSCULAR; INTRAVENOUS; SUBCUTANEOUS at 15:25

## 2019-03-10 RX ADMIN — HYDROMORPHONE HYDROCHLORIDE 1 MG: 2 INJECTION, SOLUTION INTRAMUSCULAR; INTRAVENOUS; SUBCUTANEOUS at 08:55

## 2019-03-10 RX ADMIN — SODIUM CHLORIDE, PRESERVATIVE FREE 10 ML: 5 INJECTION INTRAVENOUS at 15:25

## 2019-03-10 RX ADMIN — DEXTROSE MONOHYDRATE, SODIUM CHLORIDE, AND POTASSIUM CHLORIDE: 50; 4.5; 1.49 INJECTION, SOLUTION INTRAVENOUS at 22:43

## 2019-03-10 RX ADMIN — HYDROMORPHONE HYDROCHLORIDE 1 MG: 2 INJECTION, SOLUTION INTRAMUSCULAR; INTRAVENOUS; SUBCUTANEOUS at 11:38

## 2019-03-10 RX ADMIN — PIPERACILLIN AND TAZOBACTAM 3.38 G: 3; .375 INJECTION, POWDER, FOR SOLUTION INTRAVENOUS at 08:44

## 2019-03-10 ASSESSMENT — PAIN SCALES - GENERAL
PAINLEVEL_OUTOF10: 8
PAINLEVEL_OUTOF10: 7
PAINLEVEL_OUTOF10: 8
PAINLEVEL_OUTOF10: 8
PAINLEVEL_OUTOF10: 9
PAINLEVEL_OUTOF10: 9
PAINLEVEL_OUTOF10: 8
PAINLEVEL_OUTOF10: 7
PAINLEVEL_OUTOF10: 8

## 2019-03-10 ASSESSMENT — PAIN DESCRIPTION - LOCATION
LOCATION: ABDOMEN;SHOULDER
LOCATION: SHOULDER;BACK

## 2019-03-10 ASSESSMENT — PAIN DESCRIPTION - ORIENTATION: ORIENTATION: RIGHT

## 2019-03-11 LAB
ANION GAP SERPL CALCULATED.3IONS-SCNC: 8 MMOL/L (ref 3–16)
ANISOCYTOSIS: ABNORMAL
BANDED NEUTROPHILS RELATIVE PERCENT: 16 % (ref 0–7)
BASOPHILS ABSOLUTE: 0 K/UL (ref 0–0.2)
BASOPHILS RELATIVE PERCENT: 0 %
BUN BLDV-MCNC: <2 MG/DL (ref 7–20)
CALCIUM SERPL-MCNC: 7.2 MG/DL (ref 8.3–10.6)
CHLORIDE BLD-SCNC: 105 MMOL/L (ref 99–110)
CO2: 23 MMOL/L (ref 21–32)
CREAT SERPL-MCNC: <0.5 MG/DL (ref 0.6–1.1)
EOSINOPHILS ABSOLUTE: 0.2 K/UL (ref 0–0.6)
EOSINOPHILS RELATIVE PERCENT: 1 %
GFR AFRICAN AMERICAN: >60
GFR NON-AFRICAN AMERICAN: >60
GLUCOSE BLD-MCNC: 104 MG/DL (ref 70–99)
HCT VFR BLD CALC: 23.4 % (ref 36–48)
HEMOGLOBIN: 7.9 G/DL (ref 12–16)
HYPOCHROMIA: ABNORMAL
LYMPHOCYTES ABSOLUTE: 1.5 K/UL (ref 1–5.1)
LYMPHOCYTES RELATIVE PERCENT: 10 %
MCH RBC QN AUTO: 31.4 PG (ref 26–34)
MCHC RBC AUTO-ENTMCNC: 33.6 G/DL (ref 31–36)
MCV RBC AUTO: 93.5 FL (ref 80–100)
METAMYELOCYTES RELATIVE PERCENT: 1 %
MONOCYTES ABSOLUTE: 0.8 K/UL (ref 0–1.3)
MONOCYTES RELATIVE PERCENT: 5 %
NEUTROPHILS ABSOLUTE: 12.8 K/UL (ref 1.7–7.7)
NEUTROPHILS RELATIVE PERCENT: 67 %
PDW BLD-RTO: 14.7 % (ref 12.4–15.4)
PLATELET # BLD: 268 K/UL (ref 135–450)
PMV BLD AUTO: 9.4 FL (ref 5–10.5)
POTASSIUM SERPL-SCNC: 3.7 MMOL/L (ref 3.5–5.1)
RBC # BLD: 2.5 M/UL (ref 4–5.2)
SODIUM BLD-SCNC: 136 MMOL/L (ref 136–145)
TOXIC GRANULATION: PRESENT
WBC # BLD: 15.2 K/UL (ref 4–11)

## 2019-03-11 PROCEDURE — 6370000000 HC RX 637 (ALT 250 FOR IP): Performed by: SURGERY

## 2019-03-11 PROCEDURE — 97530 THERAPEUTIC ACTIVITIES: CPT

## 2019-03-11 PROCEDURE — 97163 PT EVAL HIGH COMPLEX 45 MIN: CPT

## 2019-03-11 PROCEDURE — 6360000002 HC RX W HCPCS: Performed by: SURGERY

## 2019-03-11 PROCEDURE — 2580000003 HC RX 258: Performed by: SURGERY

## 2019-03-11 PROCEDURE — 80048 BASIC METABOLIC PNL TOTAL CA: CPT

## 2019-03-11 PROCEDURE — APPSS45 APP SPLIT SHARED TIME 31-45 MINUTES: Performed by: CLINICAL NURSE SPECIALIST

## 2019-03-11 PROCEDURE — 97166 OT EVAL MOD COMPLEX 45 MIN: CPT

## 2019-03-11 PROCEDURE — 36592 COLLECT BLOOD FROM PICC: CPT

## 2019-03-11 PROCEDURE — 2500000003 HC RX 250 WO HCPCS: Performed by: CLINICAL NURSE SPECIALIST

## 2019-03-11 PROCEDURE — 85025 COMPLETE CBC W/AUTO DIFF WBC: CPT

## 2019-03-11 PROCEDURE — 99024 POSTOP FOLLOW-UP VISIT: CPT | Performed by: SURGERY

## 2019-03-11 PROCEDURE — 2580000003 HC RX 258

## 2019-03-11 PROCEDURE — 1200000000 HC SEMI PRIVATE

## 2019-03-11 RX ORDER — SODIUM CHLORIDE 9 MG/ML
INJECTION, SOLUTION INTRAVENOUS
Status: COMPLETED
Start: 2019-03-11 | End: 2019-03-11

## 2019-03-11 RX ADMIN — HYDROMORPHONE HYDROCHLORIDE 1 MG: 2 INJECTION, SOLUTION INTRAMUSCULAR; INTRAVENOUS; SUBCUTANEOUS at 09:55

## 2019-03-11 RX ADMIN — PIPERACILLIN AND TAZOBACTAM 3.38 G: 3; .375 INJECTION, POWDER, FOR SOLUTION INTRAVENOUS at 00:00

## 2019-03-11 RX ADMIN — HYDROMORPHONE HYDROCHLORIDE 1 MG: 2 INJECTION, SOLUTION INTRAMUSCULAR; INTRAVENOUS; SUBCUTANEOUS at 21:11

## 2019-03-11 RX ADMIN — PIPERACILLIN AND TAZOBACTAM 3.38 G: 3; .375 INJECTION, POWDER, FOR SOLUTION INTRAVENOUS at 07:13

## 2019-03-11 RX ADMIN — HYDROMORPHONE HYDROCHLORIDE 1 MG: 2 INJECTION, SOLUTION INTRAMUSCULAR; INTRAVENOUS; SUBCUTANEOUS at 14:32

## 2019-03-11 RX ADMIN — ACETAMINOPHEN 650 MG: 325 TABLET ORAL at 00:00

## 2019-03-11 RX ADMIN — HYDROMORPHONE HYDROCHLORIDE 1 MG: 2 INJECTION, SOLUTION INTRAMUSCULAR; INTRAVENOUS; SUBCUTANEOUS at 12:43

## 2019-03-11 RX ADMIN — PIPERACILLIN AND TAZOBACTAM 3.38 G: 3; .375 INJECTION, POWDER, FOR SOLUTION INTRAVENOUS at 15:03

## 2019-03-11 RX ADMIN — HYDROMORPHONE HYDROCHLORIDE 1 MG: 2 INJECTION, SOLUTION INTRAMUSCULAR; INTRAVENOUS; SUBCUTANEOUS at 08:13

## 2019-03-11 RX ADMIN — FAMOTIDINE 20 MG: 20 TABLET ORAL at 21:10

## 2019-03-11 RX ADMIN — Medication 10 ML: at 21:10

## 2019-03-11 RX ADMIN — HYDROMORPHONE HYDROCHLORIDE 1 MG: 2 INJECTION, SOLUTION INTRAMUSCULAR; INTRAVENOUS; SUBCUTANEOUS at 05:32

## 2019-03-11 RX ADMIN — FAMOTIDINE 20 MG: 20 TABLET ORAL at 09:08

## 2019-03-11 RX ADMIN — DEXTROSE MONOHYDRATE, SODIUM CHLORIDE, AND POTASSIUM CHLORIDE: 50; 4.5; 1.49 INJECTION, SOLUTION INTRAVENOUS at 23:10

## 2019-03-11 RX ADMIN — SODIUM CHLORIDE 500 ML: 9 INJECTION, SOLUTION INTRAVENOUS at 12:45

## 2019-03-11 RX ADMIN — Medication 10 ML: at 09:08

## 2019-03-11 RX ADMIN — HYDROMORPHONE HYDROCHLORIDE 1 MG: 2 INJECTION, SOLUTION INTRAMUSCULAR; INTRAVENOUS; SUBCUTANEOUS at 18:47

## 2019-03-11 RX ADMIN — HYDROMORPHONE HYDROCHLORIDE 1 MG: 2 INJECTION, SOLUTION INTRAMUSCULAR; INTRAVENOUS; SUBCUTANEOUS at 16:41

## 2019-03-11 RX ADMIN — PIPERACILLIN AND TAZOBACTAM 3.38 G: 3; .375 INJECTION, POWDER, FOR SOLUTION INTRAVENOUS at 23:01

## 2019-03-11 RX ADMIN — HYDROMORPHONE HYDROCHLORIDE 1 MG: 2 INJECTION, SOLUTION INTRAMUSCULAR; INTRAVENOUS; SUBCUTANEOUS at 03:23

## 2019-03-11 ASSESSMENT — PAIN SCALES - GENERAL
PAINLEVEL_OUTOF10: 9
PAINLEVEL_OUTOF10: 7
PAINLEVEL_OUTOF10: 9
PAINLEVEL_OUTOF10: 8
PAINLEVEL_OUTOF10: 7
PAINLEVEL_OUTOF10: 7
PAINLEVEL_OUTOF10: 9
PAINLEVEL_OUTOF10: 0
PAINLEVEL_OUTOF10: 8
PAINLEVEL_OUTOF10: 7
PAINLEVEL_OUTOF10: 9
PAINLEVEL_OUTOF10: 10
PAINLEVEL_OUTOF10: 8
PAINLEVEL_OUTOF10: 9
PAINLEVEL_OUTOF10: 9

## 2019-03-11 ASSESSMENT — PAIN DESCRIPTION - LOCATION
LOCATION: ABDOMEN

## 2019-03-11 ASSESSMENT — PAIN DESCRIPTION - PROGRESSION
CLINICAL_PROGRESSION: NOT CHANGED

## 2019-03-11 ASSESSMENT — PAIN DESCRIPTION - DESCRIPTORS
DESCRIPTORS: ACHING
DESCRIPTORS: ACHING
DESCRIPTORS: ACHING;SHARP

## 2019-03-11 ASSESSMENT — PAIN DESCRIPTION - ORIENTATION
ORIENTATION: RIGHT
ORIENTATION: RIGHT

## 2019-03-11 ASSESSMENT — PAIN DESCRIPTION - PAIN TYPE
TYPE: ACUTE PAIN
TYPE: ACUTE PAIN;SURGICAL PAIN
TYPE: ACUTE PAIN;SURGICAL PAIN
TYPE: ACUTE PAIN
TYPE: ACUTE PAIN
TYPE: ACUTE PAIN;SURGICAL PAIN

## 2019-03-11 ASSESSMENT — PAIN DESCRIPTION - FREQUENCY
FREQUENCY: CONTINUOUS

## 2019-03-11 ASSESSMENT — PAIN - FUNCTIONAL ASSESSMENT
PAIN_FUNCTIONAL_ASSESSMENT: ACTIVITIES ARE NOT PREVENTED
PAIN_FUNCTIONAL_ASSESSMENT: PREVENTS OR INTERFERES SOME ACTIVE ACTIVITIES AND ADLS

## 2019-03-11 ASSESSMENT — PAIN DESCRIPTION - ONSET
ONSET: ON-GOING
ONSET: ON-GOING

## 2019-03-12 ENCOUNTER — APPOINTMENT (OUTPATIENT)
Dept: CT IMAGING | Age: 29
DRG: 853 | End: 2019-03-12
Payer: MEDICARE

## 2019-03-12 PROCEDURE — 6360000002 HC RX W HCPCS: Performed by: SURGERY

## 2019-03-12 PROCEDURE — 6370000000 HC RX 637 (ALT 250 FOR IP): Performed by: CLINICAL NURSE SPECIALIST

## 2019-03-12 PROCEDURE — 2500000003 HC RX 250 WO HCPCS: Performed by: CLINICAL NURSE SPECIALIST

## 2019-03-12 PROCEDURE — 99024 POSTOP FOLLOW-UP VISIT: CPT | Performed by: SURGERY

## 2019-03-12 PROCEDURE — 6360000004 HC RX CONTRAST MEDICATION: Performed by: SURGERY

## 2019-03-12 PROCEDURE — 1200000000 HC SEMI PRIVATE

## 2019-03-12 PROCEDURE — 74177 CT ABD & PELVIS W/CONTRAST: CPT

## 2019-03-12 PROCEDURE — 6370000000 HC RX 637 (ALT 250 FOR IP): Performed by: SURGERY

## 2019-03-12 PROCEDURE — 2580000003 HC RX 258: Performed by: SURGERY

## 2019-03-12 PROCEDURE — APPSS45 APP SPLIT SHARED TIME 31-45 MINUTES: Performed by: CLINICAL NURSE SPECIALIST

## 2019-03-12 RX ORDER — OXYCODONE HYDROCHLORIDE 5 MG/1
10 TABLET ORAL EVERY 4 HOURS PRN
Status: DISCONTINUED | OUTPATIENT
Start: 2019-03-12 | End: 2019-03-14 | Stop reason: HOSPADM

## 2019-03-12 RX ORDER — OXYCODONE HYDROCHLORIDE 5 MG/1
5 TABLET ORAL EVERY 4 HOURS PRN
Status: DISCONTINUED | OUTPATIENT
Start: 2019-03-12 | End: 2019-03-14 | Stop reason: HOSPADM

## 2019-03-12 RX ADMIN — ACETAMINOPHEN 650 MG: 325 TABLET ORAL at 19:40

## 2019-03-12 RX ADMIN — ONDANSETRON 4 MG: 2 INJECTION INTRAMUSCULAR; INTRAVENOUS at 09:18

## 2019-03-12 RX ADMIN — FAMOTIDINE 20 MG: 20 TABLET ORAL at 09:18

## 2019-03-12 RX ADMIN — HYDROMORPHONE HYDROCHLORIDE 1 MG: 2 INJECTION, SOLUTION INTRAMUSCULAR; INTRAVENOUS; SUBCUTANEOUS at 19:41

## 2019-03-12 RX ADMIN — ENOXAPARIN SODIUM 40 MG: 40 INJECTION SUBCUTANEOUS at 18:40

## 2019-03-12 RX ADMIN — OXYCODONE HYDROCHLORIDE 10 MG: 5 TABLET ORAL at 12:55

## 2019-03-12 RX ADMIN — DEXTROSE MONOHYDRATE, SODIUM CHLORIDE, AND POTASSIUM CHLORIDE: 50; 4.5; 1.49 INJECTION, SOLUTION INTRAVENOUS at 09:29

## 2019-03-12 RX ADMIN — PIPERACILLIN AND TAZOBACTAM 3.38 G: 3; .375 INJECTION, POWDER, FOR SOLUTION INTRAVENOUS at 22:43

## 2019-03-12 RX ADMIN — HYDROMORPHONE HYDROCHLORIDE 1 MG: 2 INJECTION, SOLUTION INTRAMUSCULAR; INTRAVENOUS; SUBCUTANEOUS at 06:11

## 2019-03-12 RX ADMIN — IOHEXOL 50 ML: 240 INJECTION, SOLUTION INTRATHECAL; INTRAVASCULAR; INTRAVENOUS; ORAL at 09:21

## 2019-03-12 RX ADMIN — HYDROMORPHONE HYDROCHLORIDE 1 MG: 2 INJECTION, SOLUTION INTRAMUSCULAR; INTRAVENOUS; SUBCUTANEOUS at 11:39

## 2019-03-12 RX ADMIN — IOPAMIDOL 75 ML: 755 INJECTION, SOLUTION INTRAVENOUS at 14:09

## 2019-03-12 RX ADMIN — FAMOTIDINE 20 MG: 20 TABLET ORAL at 19:40

## 2019-03-12 RX ADMIN — ACETAMINOPHEN 650 MG: 325 TABLET ORAL at 02:04

## 2019-03-12 RX ADMIN — OXYCODONE HYDROCHLORIDE 10 MG: 5 TABLET ORAL at 22:43

## 2019-03-12 RX ADMIN — PIPERACILLIN AND TAZOBACTAM 3.38 G: 3; .375 INJECTION, POWDER, FOR SOLUTION INTRAVENOUS at 09:18

## 2019-03-12 RX ADMIN — HYDROMORPHONE HYDROCHLORIDE 1 MG: 2 INJECTION, SOLUTION INTRAMUSCULAR; INTRAVENOUS; SUBCUTANEOUS at 16:23

## 2019-03-12 RX ADMIN — PIPERACILLIN AND TAZOBACTAM 3.38 G: 3; .375 INJECTION, POWDER, FOR SOLUTION INTRAVENOUS at 16:23

## 2019-03-12 RX ADMIN — HYDROMORPHONE HYDROCHLORIDE 1 MG: 2 INJECTION, SOLUTION INTRAMUSCULAR; INTRAVENOUS; SUBCUTANEOUS at 02:00

## 2019-03-12 RX ADMIN — Medication 10 ML: at 19:41

## 2019-03-12 RX ADMIN — Medication 10 ML: at 09:19

## 2019-03-12 RX ADMIN — OXYCODONE HYDROCHLORIDE 10 MG: 5 TABLET ORAL at 18:39

## 2019-03-12 RX ADMIN — HYDROMORPHONE HYDROCHLORIDE 1 MG: 2 INJECTION, SOLUTION INTRAMUSCULAR; INTRAVENOUS; SUBCUTANEOUS at 09:19

## 2019-03-12 ASSESSMENT — PAIN SCALES - GENERAL
PAINLEVEL_OUTOF10: 9
PAINLEVEL_OUTOF10: 7
PAINLEVEL_OUTOF10: 9
PAINLEVEL_OUTOF10: 9
PAINLEVEL_OUTOF10: 10
PAINLEVEL_OUTOF10: 9
PAINLEVEL_OUTOF10: 8
PAINLEVEL_OUTOF10: 8
PAINLEVEL_OUTOF10: 9
PAINLEVEL_OUTOF10: 7
PAINLEVEL_OUTOF10: 6
PAINLEVEL_OUTOF10: 8

## 2019-03-12 ASSESSMENT — PAIN DESCRIPTION - PROGRESSION

## 2019-03-12 ASSESSMENT — PAIN DESCRIPTION - DESCRIPTORS: DESCRIPTORS: ACHING;SHARP

## 2019-03-12 ASSESSMENT — PAIN DESCRIPTION - PAIN TYPE
TYPE: ACUTE PAIN
TYPE: ACUTE PAIN;SURGICAL PAIN

## 2019-03-12 ASSESSMENT — PAIN DESCRIPTION - FREQUENCY: FREQUENCY: CONTINUOUS

## 2019-03-12 ASSESSMENT — PAIN DESCRIPTION - LOCATION
LOCATION: ABDOMEN
LOCATION: ABDOMEN

## 2019-03-12 ASSESSMENT — PAIN DESCRIPTION - ORIENTATION: ORIENTATION: RIGHT

## 2019-03-12 ASSESSMENT — PAIN DESCRIPTION - ONSET: ONSET: ON-GOING

## 2019-03-12 ASSESSMENT — PAIN - FUNCTIONAL ASSESSMENT
PAIN_FUNCTIONAL_ASSESSMENT: ACTIVITIES ARE NOT PREVENTED

## 2019-03-13 LAB
ANION GAP SERPL CALCULATED.3IONS-SCNC: 7 MMOL/L (ref 3–16)
BUN BLDV-MCNC: 3 MG/DL (ref 7–20)
CALCIUM SERPL-MCNC: 7.2 MG/DL (ref 8.3–10.6)
CHLORIDE BLD-SCNC: 101 MMOL/L (ref 99–110)
CO2: 25 MMOL/L (ref 21–32)
CREAT SERPL-MCNC: <0.5 MG/DL (ref 0.6–1.1)
GFR AFRICAN AMERICAN: >60
GFR NON-AFRICAN AMERICAN: >60
GLUCOSE BLD-MCNC: 127 MG/DL (ref 70–99)
HCT VFR BLD CALC: 22.1 % (ref 36–48)
HEMOGLOBIN: 7.3 G/DL (ref 12–16)
LACTIC ACID: 2 MMOL/L (ref 0.4–2)
MCH RBC QN AUTO: 31.2 PG (ref 26–34)
MCHC RBC AUTO-ENTMCNC: 32.9 G/DL (ref 31–36)
MCV RBC AUTO: 94.8 FL (ref 80–100)
PDW BLD-RTO: 14.8 % (ref 12.4–15.4)
PLATELET # BLD: 332 K/UL (ref 135–450)
PMV BLD AUTO: 9.5 FL (ref 5–10.5)
POTASSIUM SERPL-SCNC: 3.9 MMOL/L (ref 3.5–5.1)
RBC # BLD: 2.34 M/UL (ref 4–5.2)
SODIUM BLD-SCNC: 133 MMOL/L (ref 136–145)
WBC # BLD: 12.6 K/UL (ref 4–11)

## 2019-03-13 PROCEDURE — 80048 BASIC METABOLIC PNL TOTAL CA: CPT

## 2019-03-13 PROCEDURE — 97535 SELF CARE MNGMENT TRAINING: CPT

## 2019-03-13 PROCEDURE — 97530 THERAPEUTIC ACTIVITIES: CPT

## 2019-03-13 PROCEDURE — 99024 POSTOP FOLLOW-UP VISIT: CPT | Performed by: SURGERY

## 2019-03-13 PROCEDURE — 83605 ASSAY OF LACTIC ACID: CPT

## 2019-03-13 PROCEDURE — 6360000002 HC RX W HCPCS: Performed by: SURGERY

## 2019-03-13 PROCEDURE — 6370000000 HC RX 637 (ALT 250 FOR IP): Performed by: CLINICAL NURSE SPECIALIST

## 2019-03-13 PROCEDURE — 2580000003 HC RX 258: Performed by: SURGERY

## 2019-03-13 PROCEDURE — 1200000000 HC SEMI PRIVATE

## 2019-03-13 PROCEDURE — 2500000003 HC RX 250 WO HCPCS: Performed by: CLINICAL NURSE SPECIALIST

## 2019-03-13 PROCEDURE — 6370000000 HC RX 637 (ALT 250 FOR IP): Performed by: SURGERY

## 2019-03-13 PROCEDURE — 85027 COMPLETE CBC AUTOMATED: CPT

## 2019-03-13 PROCEDURE — APPSS60 APP SPLIT SHARED TIME 46-60 MINUTES: Performed by: CLINICAL NURSE SPECIALIST

## 2019-03-13 RX ORDER — CASTOR OIL AND BALSAM, PERU 788; 87 MG/G; MG/G
OINTMENT TOPICAL 2 TIMES DAILY
Status: DISCONTINUED | OUTPATIENT
Start: 2019-03-13 | End: 2019-03-14 | Stop reason: HOSPADM

## 2019-03-13 RX ADMIN — HYDROMORPHONE HYDROCHLORIDE 1 MG: 2 INJECTION, SOLUTION INTRAMUSCULAR; INTRAVENOUS; SUBCUTANEOUS at 10:46

## 2019-03-13 RX ADMIN — PIPERACILLIN AND TAZOBACTAM 3.38 G: 3; .375 INJECTION, POWDER, FOR SOLUTION INTRAVENOUS at 18:52

## 2019-03-13 RX ADMIN — HYDROMORPHONE HYDROCHLORIDE 1 MG: 2 INJECTION, SOLUTION INTRAMUSCULAR; INTRAVENOUS; SUBCUTANEOUS at 15:57

## 2019-03-13 RX ADMIN — HYDROMORPHONE HYDROCHLORIDE 1 MG: 2 INJECTION, SOLUTION INTRAMUSCULAR; INTRAVENOUS; SUBCUTANEOUS at 05:44

## 2019-03-13 RX ADMIN — Medication 10 ML: at 20:09

## 2019-03-13 RX ADMIN — ACETAMINOPHEN 650 MG: 325 TABLET ORAL at 12:34

## 2019-03-13 RX ADMIN — HYDROMORPHONE HYDROCHLORIDE 1 MG: 2 INJECTION, SOLUTION INTRAMUSCULAR; INTRAVENOUS; SUBCUTANEOUS at 01:58

## 2019-03-13 RX ADMIN — CASTOR OIL AND BALSAM, PERU: 788; 87 OINTMENT TOPICAL at 22:02

## 2019-03-13 RX ADMIN — OXYCODONE HYDROCHLORIDE 10 MG: 5 TABLET ORAL at 04:21

## 2019-03-13 RX ADMIN — FAMOTIDINE 20 MG: 20 TABLET ORAL at 20:08

## 2019-03-13 RX ADMIN — DEXTROSE MONOHYDRATE, SODIUM CHLORIDE, AND POTASSIUM CHLORIDE: 50; 4.5; 1.49 INJECTION, SOLUTION INTRAVENOUS at 04:21

## 2019-03-13 RX ADMIN — PIPERACILLIN AND TAZOBACTAM 3.38 G: 3; .375 INJECTION, POWDER, FOR SOLUTION INTRAVENOUS at 05:44

## 2019-03-13 RX ADMIN — ENOXAPARIN SODIUM 40 MG: 40 INJECTION SUBCUTANEOUS at 09:10

## 2019-03-13 RX ADMIN — OXYCODONE HYDROCHLORIDE 10 MG: 5 TABLET ORAL at 19:01

## 2019-03-13 RX ADMIN — HYDROMORPHONE HYDROCHLORIDE 1 MG: 2 INJECTION, SOLUTION INTRAMUSCULAR; INTRAVENOUS; SUBCUTANEOUS at 20:11

## 2019-03-13 RX ADMIN — FAMOTIDINE 20 MG: 20 TABLET ORAL at 09:10

## 2019-03-13 RX ADMIN — OXYCODONE HYDROCHLORIDE 10 MG: 5 TABLET ORAL at 09:10

## 2019-03-13 RX ADMIN — Medication 10 ML: at 09:11

## 2019-03-13 ASSESSMENT — PAIN DESCRIPTION - PROGRESSION

## 2019-03-13 ASSESSMENT — PAIN SCALES - GENERAL
PAINLEVEL_OUTOF10: 10
PAINLEVEL_OUTOF10: 10
PAINLEVEL_OUTOF10: 7
PAINLEVEL_OUTOF10: 9
PAINLEVEL_OUTOF10: 9
PAINLEVEL_OUTOF10: 8
PAINLEVEL_OUTOF10: 9
PAINLEVEL_OUTOF10: 10
PAINLEVEL_OUTOF10: 8
PAINLEVEL_OUTOF10: 8
PAINLEVEL_OUTOF10: 10
PAINLEVEL_OUTOF10: 10
PAINLEVEL_OUTOF10: 7
PAINLEVEL_OUTOF10: 9
PAINLEVEL_OUTOF10: 10

## 2019-03-13 ASSESSMENT — PAIN DESCRIPTION - PAIN TYPE
TYPE: ACUTE PAIN;SURGICAL PAIN
TYPE: ACUTE PAIN;SURGICAL PAIN

## 2019-03-13 ASSESSMENT — PAIN DESCRIPTION - DESCRIPTORS: DESCRIPTORS: SHARP

## 2019-03-13 ASSESSMENT — PAIN DESCRIPTION - ORIENTATION
ORIENTATION: RIGHT;LOWER
ORIENTATION: RIGHT;LOWER

## 2019-03-13 ASSESSMENT — PAIN DESCRIPTION - FREQUENCY
FREQUENCY: INTERMITTENT
FREQUENCY: INTERMITTENT

## 2019-03-13 ASSESSMENT — PAIN DESCRIPTION - ONSET: ONSET: ON-GOING

## 2019-03-13 ASSESSMENT — PAIN DESCRIPTION - LOCATION
LOCATION: ABDOMEN
LOCATION: ABDOMEN

## 2019-03-14 VITALS
DIASTOLIC BLOOD PRESSURE: 74 MMHG | RESPIRATION RATE: 16 BRPM | BODY MASS INDEX: 24.06 KG/M2 | OXYGEN SATURATION: 99 % | HEART RATE: 75 BPM | HEIGHT: 58 IN | TEMPERATURE: 98.1 F | WEIGHT: 114.64 LBS | SYSTOLIC BLOOD PRESSURE: 113 MMHG

## 2019-03-14 LAB
HCT VFR BLD CALC: 21.4 % (ref 36–48)
HEMOGLOBIN: 7.2 G/DL (ref 12–16)
MCH RBC QN AUTO: 31.8 PG (ref 26–34)
MCHC RBC AUTO-ENTMCNC: 33.7 G/DL (ref 31–36)
MCV RBC AUTO: 94.4 FL (ref 80–100)
PDW BLD-RTO: 14.6 % (ref 12.4–15.4)
PLATELET # BLD: 356 K/UL (ref 135–450)
PMV BLD AUTO: 9.4 FL (ref 5–10.5)
RBC # BLD: 2.27 M/UL (ref 4–5.2)
WBC # BLD: 10.1 K/UL (ref 4–11)

## 2019-03-14 PROCEDURE — 6360000002 HC RX W HCPCS: Performed by: SURGERY

## 2019-03-14 PROCEDURE — 85027 COMPLETE CBC AUTOMATED: CPT

## 2019-03-14 PROCEDURE — 6370000000 HC RX 637 (ALT 250 FOR IP): Performed by: CLINICAL NURSE SPECIALIST

## 2019-03-14 PROCEDURE — 99024 POSTOP FOLLOW-UP VISIT: CPT | Performed by: SURGERY

## 2019-03-14 PROCEDURE — 2580000003 HC RX 258: Performed by: SURGERY

## 2019-03-14 PROCEDURE — APPSS45 APP SPLIT SHARED TIME 31-45 MINUTES: Performed by: CLINICAL NURSE SPECIALIST

## 2019-03-14 PROCEDURE — 2500000003 HC RX 250 WO HCPCS: Performed by: CLINICAL NURSE SPECIALIST

## 2019-03-14 PROCEDURE — 6370000000 HC RX 637 (ALT 250 FOR IP): Performed by: SURGERY

## 2019-03-14 RX ORDER — OXYCODONE HYDROCHLORIDE 5 MG/1
5-10 TABLET ORAL EVERY 4 HOURS PRN
Qty: 28 TABLET | Refills: 0 | Status: SHIPPED | OUTPATIENT
Start: 2019-03-14 | End: 2019-03-17

## 2019-03-14 RX ORDER — CIPROFLOXACIN 500 MG/1
500 TABLET, FILM COATED ORAL 2 TIMES DAILY
Qty: 20 TABLET | Refills: 0 | Status: ON HOLD | OUTPATIENT
Start: 2019-03-14 | End: 2019-03-21 | Stop reason: SINTOL

## 2019-03-14 RX ADMIN — HYDROMORPHONE HYDROCHLORIDE 1 MG: 2 INJECTION, SOLUTION INTRAMUSCULAR; INTRAVENOUS; SUBCUTANEOUS at 17:35

## 2019-03-14 RX ADMIN — DEXTROSE MONOHYDRATE, SODIUM CHLORIDE, AND POTASSIUM CHLORIDE: 50; 4.5; 1.49 INJECTION, SOLUTION INTRAVENOUS at 00:13

## 2019-03-14 RX ADMIN — OXYCODONE HYDROCHLORIDE 10 MG: 5 TABLET ORAL at 05:47

## 2019-03-14 RX ADMIN — ACETAMINOPHEN 650 MG: 325 TABLET ORAL at 05:47

## 2019-03-14 RX ADMIN — FAMOTIDINE 20 MG: 20 TABLET ORAL at 09:07

## 2019-03-14 RX ADMIN — OXYCODONE HYDROCHLORIDE 10 MG: 5 TABLET ORAL at 13:49

## 2019-03-14 RX ADMIN — SODIUM CHLORIDE, PRESERVATIVE FREE 10 ML: 5 INJECTION INTRAVENOUS at 05:47

## 2019-03-14 RX ADMIN — PIPERACILLIN AND TAZOBACTAM 3.38 G: 3; .375 INJECTION, POWDER, FOR SOLUTION INTRAVENOUS at 00:12

## 2019-03-14 RX ADMIN — PIPERACILLIN AND TAZOBACTAM 3.38 G: 3; .375 INJECTION, POWDER, FOR SOLUTION INTRAVENOUS at 07:55

## 2019-03-14 RX ADMIN — SODIUM CHLORIDE, PRESERVATIVE FREE 10 ML: 5 INJECTION INTRAVENOUS at 09:22

## 2019-03-14 RX ADMIN — CASTOR OIL AND BALSAM, PERU: 788; 87 OINTMENT TOPICAL at 09:12

## 2019-03-14 RX ADMIN — ENOXAPARIN SODIUM 40 MG: 40 INJECTION SUBCUTANEOUS at 09:07

## 2019-03-14 RX ADMIN — HYDROMORPHONE HYDROCHLORIDE 1 MG: 2 INJECTION, SOLUTION INTRAMUSCULAR; INTRAVENOUS; SUBCUTANEOUS at 09:21

## 2019-03-14 RX ADMIN — Medication 10 ML: at 09:08

## 2019-03-14 RX ADMIN — OXYCODONE HYDROCHLORIDE 10 MG: 5 TABLET ORAL at 01:10

## 2019-03-14 ASSESSMENT — PAIN DESCRIPTION - PROGRESSION
CLINICAL_PROGRESSION: NOT CHANGED

## 2019-03-14 ASSESSMENT — PAIN DESCRIPTION - PAIN TYPE: TYPE: ACUTE PAIN;SURGICAL PAIN

## 2019-03-14 ASSESSMENT — PAIN SCALES - GENERAL
PAINLEVEL_OUTOF10: 8
PAINLEVEL_OUTOF10: 4
PAINLEVEL_OUTOF10: 10
PAINLEVEL_OUTOF10: 7
PAINLEVEL_OUTOF10: 8
PAINLEVEL_OUTOF10: 8
PAINLEVEL_OUTOF10: 7
PAINLEVEL_OUTOF10: 6
PAINLEVEL_OUTOF10: 8

## 2019-03-14 ASSESSMENT — PAIN DESCRIPTION - ORIENTATION: ORIENTATION: RIGHT;LOWER

## 2019-03-14 ASSESSMENT — PAIN DESCRIPTION - ONSET: ONSET: ON-GOING

## 2019-03-14 ASSESSMENT — PAIN DESCRIPTION - DESCRIPTORS: DESCRIPTORS: ACHING;SHARP

## 2019-03-14 ASSESSMENT — PAIN DESCRIPTION - FREQUENCY: FREQUENCY: INTERMITTENT

## 2019-03-14 ASSESSMENT — PAIN DESCRIPTION - LOCATION: LOCATION: ABDOMEN

## 2019-03-15 ENCOUNTER — HOSPITAL ENCOUNTER (EMERGENCY)
Age: 29
Discharge: HOME | End: 2019-03-15
Attending: EMERGENCY MEDICINE
Payer: MEDICARE

## 2019-03-15 ENCOUNTER — APPOINTMENT (OUTPATIENT)
Dept: CT IMAGING | Age: 29
End: 2019-03-15
Payer: MEDICARE

## 2019-03-15 VITALS
SYSTOLIC BLOOD PRESSURE: 107 MMHG | HEIGHT: 58 IN | TEMPERATURE: 98.4 F | BODY MASS INDEX: 24.14 KG/M2 | RESPIRATION RATE: 14 BRPM | HEART RATE: 85 BPM | OXYGEN SATURATION: 100 % | WEIGHT: 115 LBS | DIASTOLIC BLOOD PRESSURE: 73 MMHG

## 2019-03-15 DIAGNOSIS — T81.31XA DEHISCENCE OF OPERATIVE WOUND, INITIAL ENCOUNTER: ICD-10-CM

## 2019-03-15 DIAGNOSIS — K65.1 INTRA-ABDOMINAL ABSCESS (HCC): Primary | ICD-10-CM

## 2019-03-15 DIAGNOSIS — G82.20 PARAPLEGIA (HCC): ICD-10-CM

## 2019-03-15 LAB
A/G RATIO: 0.3 (ref 1.1–2.2)
ALBUMIN SERPL-MCNC: 1.4 G/DL (ref 3.4–5)
ALP BLD-CCNC: 66 U/L (ref 40–129)
ALT SERPL-CCNC: <5 U/L (ref 10–40)
ANION GAP SERPL CALCULATED.3IONS-SCNC: 9 MMOL/L (ref 3–16)
AST SERPL-CCNC: 9 U/L (ref 15–37)
BACTERIA: ABNORMAL /HPF
BILIRUB SERPL-MCNC: <0.2 MG/DL (ref 0–1)
BILIRUBIN URINE: NEGATIVE
BLOOD, URINE: NEGATIVE
BUN BLDV-MCNC: 3 MG/DL (ref 7–20)
CALCIUM SERPL-MCNC: 7.1 MG/DL (ref 8.3–10.6)
CHLORIDE BLD-SCNC: 98 MMOL/L (ref 99–110)
CLARITY: CLEAR
CO2: 23 MMOL/L (ref 21–32)
COLOR: YELLOW
CREAT SERPL-MCNC: <0.5 MG/DL (ref 0.6–1.1)
GFR AFRICAN AMERICAN: >60
GFR NON-AFRICAN AMERICAN: >60
GLOBULIN: 4.5 G/DL
GLUCOSE BLD-MCNC: 97 MG/DL (ref 70–99)
GLUCOSE URINE: NEGATIVE MG/DL
HCG QUALITATIVE: NEGATIVE
HCT VFR BLD CALC: 23.1 % (ref 36–48)
HEMOGLOBIN: 7.6 G/DL (ref 12–16)
KETONES, URINE: NEGATIVE MG/DL
LACTIC ACID: 1.9 MMOL/L (ref 0.4–2)
LEUKOCYTE ESTERASE, URINE: ABNORMAL
LIPASE: 14 U/L (ref 13–60)
MCH RBC QN AUTO: 31.4 PG (ref 26–34)
MCHC RBC AUTO-ENTMCNC: 33.1 G/DL (ref 31–36)
MCV RBC AUTO: 94.9 FL (ref 80–100)
MICROSCOPIC EXAMINATION: YES
NITRITE, URINE: NEGATIVE
PDW BLD-RTO: 15.2 % (ref 12.4–15.4)
PH UA: 7 (ref 5–8)
PLATELET # BLD: 368 K/UL (ref 135–450)
PMV BLD AUTO: 9 FL (ref 5–10.5)
POTASSIUM SERPL-SCNC: 3.2 MMOL/L (ref 3.5–5.1)
PROTEIN UA: NEGATIVE MG/DL
RBC # BLD: 2.43 M/UL (ref 4–5.2)
RBC UA: ABNORMAL /HPF (ref 0–2)
SODIUM BLD-SCNC: 130 MMOL/L (ref 136–145)
SPECIFIC GRAVITY UA: <=1.005 (ref 1–1.03)
TOTAL PROTEIN: 5.9 G/DL (ref 6.4–8.2)
URINE TYPE: ABNORMAL
UROBILINOGEN, URINE: 0.2 E.U./DL
WBC # BLD: 12.2 K/UL (ref 4–11)
WBC UA: ABNORMAL /HPF (ref 0–5)

## 2019-03-15 PROCEDURE — 99284 EMERGENCY DEPT VISIT MOD MDM: CPT

## 2019-03-15 PROCEDURE — 6360000004 HC RX CONTRAST MEDICATION: Performed by: EMERGENCY MEDICINE

## 2019-03-15 PROCEDURE — 6360000002 HC RX W HCPCS: Performed by: NURSE PRACTITIONER

## 2019-03-15 PROCEDURE — 36415 COLL VENOUS BLD VENIPUNCTURE: CPT

## 2019-03-15 PROCEDURE — 74177 CT ABD & PELVIS W/CONTRAST: CPT

## 2019-03-15 PROCEDURE — 96375 TX/PRO/DX INJ NEW DRUG ADDON: CPT

## 2019-03-15 PROCEDURE — 83605 ASSAY OF LACTIC ACID: CPT

## 2019-03-15 PROCEDURE — 80053 COMPREHEN METABOLIC PANEL: CPT

## 2019-03-15 PROCEDURE — 96361 HYDRATE IV INFUSION ADD-ON: CPT

## 2019-03-15 PROCEDURE — 2580000003 HC RX 258: Performed by: NURSE PRACTITIONER

## 2019-03-15 PROCEDURE — 84703 CHORIONIC GONADOTROPIN ASSAY: CPT

## 2019-03-15 PROCEDURE — 51702 INSERT TEMP BLADDER CATH: CPT

## 2019-03-15 PROCEDURE — 85027 COMPLETE CBC AUTOMATED: CPT

## 2019-03-15 PROCEDURE — 81001 URINALYSIS AUTO W/SCOPE: CPT

## 2019-03-15 PROCEDURE — 83690 ASSAY OF LIPASE: CPT

## 2019-03-15 PROCEDURE — 87040 BLOOD CULTURE FOR BACTERIA: CPT

## 2019-03-15 PROCEDURE — 96374 THER/PROPH/DIAG INJ IV PUSH: CPT

## 2019-03-15 RX ORDER — ONDANSETRON 2 MG/ML
4 INJECTION INTRAMUSCULAR; INTRAVENOUS ONCE
Status: COMPLETED | OUTPATIENT
Start: 2019-03-15 | End: 2019-03-15

## 2019-03-15 RX ORDER — POTASSIUM CHLORIDE 20 MEQ/1
40 TABLET, EXTENDED RELEASE ORAL ONCE
Status: DISCONTINUED | OUTPATIENT
Start: 2019-03-15 | End: 2019-03-15 | Stop reason: HOSPADM

## 2019-03-15 RX ORDER — OXYCODONE HYDROCHLORIDE AND ACETAMINOPHEN 5; 325 MG/1; MG/1
1 TABLET ORAL ONCE
Status: DISCONTINUED | OUTPATIENT
Start: 2019-03-15 | End: 2019-03-15

## 2019-03-15 RX ORDER — 0.9 % SODIUM CHLORIDE 0.9 %
1000 INTRAVENOUS SOLUTION INTRAVENOUS ONCE
Status: COMPLETED | OUTPATIENT
Start: 2019-03-15 | End: 2019-03-15

## 2019-03-15 RX ORDER — MORPHINE SULFATE 2 MG/ML
2 INJECTION, SOLUTION INTRAMUSCULAR; INTRAVENOUS ONCE
Status: COMPLETED | OUTPATIENT
Start: 2019-03-15 | End: 2019-03-15

## 2019-03-15 RX ADMIN — MORPHINE SULFATE 2 MG: 2 INJECTION, SOLUTION INTRAMUSCULAR; INTRAVENOUS at 02:17

## 2019-03-15 RX ADMIN — ONDANSETRON 4 MG: 2 INJECTION INTRAMUSCULAR; INTRAVENOUS at 02:16

## 2019-03-15 RX ADMIN — SODIUM CHLORIDE 1000 ML: 9 INJECTION, SOLUTION INTRAVENOUS at 02:16

## 2019-03-15 RX ADMIN — IOPAMIDOL 75 ML: 755 INJECTION, SOLUTION INTRAVENOUS at 02:41

## 2019-03-15 ASSESSMENT — PAIN DESCRIPTION - LOCATION: LOCATION: ABDOMEN

## 2019-03-15 ASSESSMENT — PAIN SCALES - GENERAL
PAINLEVEL_OUTOF10: 10
PAINLEVEL_OUTOF10: 0
PAINLEVEL_OUTOF10: 10

## 2019-03-15 ASSESSMENT — PAIN DESCRIPTION - PAIN TYPE: TYPE: ACUTE PAIN

## 2019-03-19 ENCOUNTER — TELEPHONE (OUTPATIENT)
Dept: SURGERY | Age: 29
End: 2019-03-19

## 2019-03-20 ENCOUNTER — HOSPITAL ENCOUNTER (INPATIENT)
Age: 29
LOS: 17 days | Discharge: ANOTHER HOSPITAL | DRG: 862 | End: 2019-04-06
Attending: EMERGENCY MEDICINE | Admitting: SURGERY
Payer: MEDICARE

## 2019-03-20 ENCOUNTER — TELEPHONE (OUTPATIENT)
Dept: SURGERY | Age: 29
End: 2019-03-20

## 2019-03-20 DIAGNOSIS — T81.31XA DEHISCENCE OF OPERATIVE WOUND, INITIAL ENCOUNTER: ICD-10-CM

## 2019-03-20 DIAGNOSIS — R11.2 NON-INTRACTABLE VOMITING WITH NAUSEA, UNSPECIFIED VOMITING TYPE: ICD-10-CM

## 2019-03-20 DIAGNOSIS — R10.84 GENERALIZED ABDOMINAL PAIN: Primary | ICD-10-CM

## 2019-03-20 PROBLEM — Z98.890 POST-OPERATIVE NAUSEA AND VOMITING: Status: ACTIVE | Noted: 2019-03-20

## 2019-03-20 LAB
A/G RATIO: 0.3 (ref 1.1–2.2)
ALBUMIN SERPL-MCNC: 1.5 G/DL (ref 3.4–5)
ALP BLD-CCNC: 95 U/L (ref 40–129)
ALT SERPL-CCNC: <5 U/L (ref 10–40)
AMORPHOUS: ABNORMAL /HPF
ANION GAP SERPL CALCULATED.3IONS-SCNC: 7 MMOL/L (ref 3–16)
AST SERPL-CCNC: 14 U/L (ref 15–37)
BILIRUB SERPL-MCNC: <0.2 MG/DL (ref 0–1)
BILIRUBIN URINE: NEGATIVE
BLOOD CULTURE, ROUTINE: NORMAL
BLOOD, URINE: NEGATIVE
BUN BLDV-MCNC: 5 MG/DL (ref 7–20)
CALCIUM SERPL-MCNC: 7.5 MG/DL (ref 8.3–10.6)
CHLORIDE BLD-SCNC: 92 MMOL/L (ref 99–110)
CLARITY: CLEAR
CO2: 29 MMOL/L (ref 21–32)
COLOR: YELLOW
CREAT SERPL-MCNC: <0.5 MG/DL (ref 0.6–1.1)
CULTURE, BLOOD 2: NORMAL
EPITHELIAL CELLS, UA: ABNORMAL /HPF
GFR AFRICAN AMERICAN: >60
GFR NON-AFRICAN AMERICAN: >60
GLOBULIN: 5.3 G/DL
GLUCOSE BLD-MCNC: 109 MG/DL (ref 70–99)
GLUCOSE URINE: NEGATIVE MG/DL
HCT VFR BLD CALC: 27.6 % (ref 36–48)
HEMOGLOBIN: 9.1 G/DL (ref 12–16)
KETONES, URINE: NEGATIVE MG/DL
LEUKOCYTE ESTERASE, URINE: ABNORMAL
MCH RBC QN AUTO: 31.5 PG (ref 26–34)
MCHC RBC AUTO-ENTMCNC: 33.2 G/DL (ref 31–36)
MCV RBC AUTO: 95.1 FL (ref 80–100)
MICROSCOPIC EXAMINATION: YES
NITRITE, URINE: NEGATIVE
PDW BLD-RTO: 17.1 % (ref 12.4–15.4)
PH UA: 7.5 (ref 5–8)
PLATELET # BLD: 411 K/UL (ref 135–450)
PMV BLD AUTO: 8.6 FL (ref 5–10.5)
POTASSIUM SERPL-SCNC: 3.9 MMOL/L (ref 3.5–5.1)
PROTEIN UA: NEGATIVE MG/DL
RBC # BLD: 2.9 M/UL (ref 4–5.2)
RBC UA: ABNORMAL /HPF (ref 0–2)
RENAL EPITHELIAL, UA: ABNORMAL /HPF
SODIUM BLD-SCNC: 128 MMOL/L (ref 136–145)
SPECIFIC GRAVITY UA: 1.01 (ref 1–1.03)
TOTAL PROTEIN: 6.8 G/DL (ref 6.4–8.2)
URINE TYPE: ABNORMAL
UROBILINOGEN, URINE: 0.2 E.U./DL
WBC # BLD: 15.2 K/UL (ref 4–11)
WBC UA: ABNORMAL /HPF (ref 0–5)

## 2019-03-20 PROCEDURE — 1200000000 HC SEMI PRIVATE

## 2019-03-20 PROCEDURE — 96375 TX/PRO/DX INJ NEW DRUG ADDON: CPT

## 2019-03-20 PROCEDURE — 6360000002 HC RX W HCPCS: Performed by: NURSE PRACTITIONER

## 2019-03-20 PROCEDURE — 96361 HYDRATE IV INFUSION ADD-ON: CPT

## 2019-03-20 PROCEDURE — 81001 URINALYSIS AUTO W/SCOPE: CPT

## 2019-03-20 PROCEDURE — 99285 EMERGENCY DEPT VISIT HI MDM: CPT

## 2019-03-20 PROCEDURE — 85027 COMPLETE CBC AUTOMATED: CPT

## 2019-03-20 PROCEDURE — 2580000003 HC RX 258: Performed by: NURSE PRACTITIONER

## 2019-03-20 PROCEDURE — 80053 COMPREHEN METABOLIC PANEL: CPT

## 2019-03-20 PROCEDURE — 96372 THER/PROPH/DIAG INJ SC/IM: CPT

## 2019-03-20 PROCEDURE — 96374 THER/PROPH/DIAG INJ IV PUSH: CPT

## 2019-03-20 RX ORDER — FENTANYL CITRATE 50 UG/ML
25 INJECTION, SOLUTION INTRAMUSCULAR; INTRAVENOUS ONCE
Status: COMPLETED | OUTPATIENT
Start: 2019-03-20 | End: 2019-03-20

## 2019-03-20 RX ORDER — PROMETHAZINE HYDROCHLORIDE 25 MG/ML
6.25 INJECTION, SOLUTION INTRAMUSCULAR; INTRAVENOUS ONCE
Status: COMPLETED | OUTPATIENT
Start: 2019-03-20 | End: 2019-03-20

## 2019-03-20 RX ORDER — 0.9 % SODIUM CHLORIDE 0.9 %
1000 INTRAVENOUS SOLUTION INTRAVENOUS ONCE
Status: COMPLETED | OUTPATIENT
Start: 2019-03-20 | End: 2019-03-20

## 2019-03-20 RX ORDER — MORPHINE SULFATE 2 MG/ML
2 INJECTION, SOLUTION INTRAMUSCULAR; INTRAVENOUS ONCE
Status: COMPLETED | OUTPATIENT
Start: 2019-03-20 | End: 2019-03-20

## 2019-03-20 RX ORDER — ONDANSETRON 2 MG/ML
4 INJECTION INTRAMUSCULAR; INTRAVENOUS ONCE
Status: COMPLETED | OUTPATIENT
Start: 2019-03-20 | End: 2019-03-20

## 2019-03-20 RX ADMIN — PROMETHAZINE HYDROCHLORIDE 6.25 MG: 25 INJECTION INTRAMUSCULAR; INTRAVENOUS at 21:56

## 2019-03-20 RX ADMIN — MORPHINE SULFATE 2 MG: 2 INJECTION, SOLUTION INTRAMUSCULAR; INTRAVENOUS at 20:04

## 2019-03-20 RX ADMIN — ONDANSETRON 4 MG: 2 INJECTION INTRAMUSCULAR; INTRAVENOUS at 20:04

## 2019-03-20 RX ADMIN — FENTANYL CITRATE 25 MCG: 50 INJECTION, SOLUTION INTRAMUSCULAR; INTRAVENOUS at 21:57

## 2019-03-20 RX ADMIN — SODIUM CHLORIDE 1000 ML: 9 INJECTION, SOLUTION INTRAVENOUS at 20:04

## 2019-03-20 ASSESSMENT — PAIN SCALES - GENERAL
PAINLEVEL_OUTOF10: 10
PAINLEVEL_OUTOF10: 6

## 2019-03-20 ASSESSMENT — PAIN DESCRIPTION - LOCATION: LOCATION: ABDOMEN

## 2019-03-20 ASSESSMENT — PAIN DESCRIPTION - PAIN TYPE: TYPE: ACUTE PAIN
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
## 2019-03-21 ENCOUNTER — APPOINTMENT (OUTPATIENT)
Dept: CT IMAGING | Age: 29
DRG: 862 | End: 2019-03-21
Payer: MEDICARE

## 2019-03-21 PROBLEM — E44.0 MODERATE MALNUTRITION (HCC): Chronic | Status: ACTIVE | Noted: 2019-03-21

## 2019-03-21 LAB
ANION GAP SERPL CALCULATED.3IONS-SCNC: 5 MMOL/L (ref 3–16)
BASOPHILS ABSOLUTE: 0 K/UL (ref 0–0.2)
BASOPHILS RELATIVE PERCENT: 0.4 %
BUN BLDV-MCNC: 5 MG/DL (ref 7–20)
CALCIUM SERPL-MCNC: 7.3 MG/DL (ref 8.3–10.6)
CHLORIDE BLD-SCNC: 105 MMOL/L (ref 99–110)
CO2: 27 MMOL/L (ref 21–32)
CREAT SERPL-MCNC: <0.5 MG/DL (ref 0.6–1.1)
EOSINOPHILS ABSOLUTE: 0.1 K/UL (ref 0–0.6)
EOSINOPHILS RELATIVE PERCENT: 1.1 %
GFR AFRICAN AMERICAN: >60
GFR NON-AFRICAN AMERICAN: >60
GLUCOSE BLD-MCNC: 86 MG/DL (ref 70–99)
HCT VFR BLD CALC: 23.7 % (ref 36–48)
HEMOGLOBIN: 7.9 G/DL (ref 12–16)
LYMPHOCYTES ABSOLUTE: 2 K/UL (ref 1–5.1)
LYMPHOCYTES RELATIVE PERCENT: 18.6 %
MCH RBC QN AUTO: 32.1 PG (ref 26–34)
MCHC RBC AUTO-ENTMCNC: 33.4 G/DL (ref 31–36)
MCV RBC AUTO: 96.3 FL (ref 80–100)
MONOCYTES ABSOLUTE: 0.6 K/UL (ref 0–1.3)
MONOCYTES RELATIVE PERCENT: 5.6 %
NEUTROPHILS ABSOLUTE: 8 K/UL (ref 1.7–7.7)
NEUTROPHILS RELATIVE PERCENT: 74.3 %
PDW BLD-RTO: 17.2 % (ref 12.4–15.4)
PLATELET # BLD: 367 K/UL (ref 135–450)
PMV BLD AUTO: 8.2 FL (ref 5–10.5)
POTASSIUM REFLEX MAGNESIUM: 3.7 MMOL/L (ref 3.5–5.1)
RBC # BLD: 2.46 M/UL (ref 4–5.2)
SODIUM BLD-SCNC: 137 MMOL/L (ref 136–145)
WBC # BLD: 10.8 K/UL (ref 4–11)

## 2019-03-21 PROCEDURE — 2500000003 HC RX 250 WO HCPCS: Performed by: SURGERY

## 2019-03-21 PROCEDURE — 2580000003 HC RX 258: Performed by: SURGERY

## 2019-03-21 PROCEDURE — 74177 CT ABD & PELVIS W/CONTRAST: CPT

## 2019-03-21 PROCEDURE — 80048 BASIC METABOLIC PNL TOTAL CA: CPT

## 2019-03-21 PROCEDURE — 36415 COLL VENOUS BLD VENIPUNCTURE: CPT

## 2019-03-21 PROCEDURE — 99222 1ST HOSP IP/OBS MODERATE 55: CPT | Performed by: SURGERY

## 2019-03-21 PROCEDURE — 1200000000 HC SEMI PRIVATE

## 2019-03-21 PROCEDURE — 85025 COMPLETE CBC W/AUTO DIFF WBC: CPT

## 2019-03-21 PROCEDURE — 6360000004 HC RX CONTRAST MEDICATION: Performed by: SURGERY

## 2019-03-21 PROCEDURE — 6360000002 HC RX W HCPCS: Performed by: SURGERY

## 2019-03-21 PROCEDURE — APPSS60 APP SPLIT SHARED TIME 46-60 MINUTES: Performed by: CLINICAL NURSE SPECIALIST

## 2019-03-21 RX ORDER — MORPHINE SULFATE 4 MG/ML
4 INJECTION, SOLUTION INTRAMUSCULAR; INTRAVENOUS
Status: DISCONTINUED | OUTPATIENT
Start: 2019-03-21 | End: 2019-04-06 | Stop reason: HOSPADM

## 2019-03-21 RX ORDER — SODIUM CHLORIDE 9 MG/ML
INJECTION, SOLUTION INTRAVENOUS CONTINUOUS
Status: DISCONTINUED | OUTPATIENT
Start: 2019-03-21 | End: 2019-03-31

## 2019-03-21 RX ORDER — SODIUM CHLORIDE 0.9 % (FLUSH) 0.9 %
10 SYRINGE (ML) INJECTION PRN
Status: DISCONTINUED | OUTPATIENT
Start: 2019-03-21 | End: 2019-03-28 | Stop reason: SDUPTHER

## 2019-03-21 RX ORDER — SODIUM CHLORIDE 0.9 % (FLUSH) 0.9 %
10 SYRINGE (ML) INJECTION EVERY 12 HOURS SCHEDULED
Status: DISCONTINUED | OUTPATIENT
Start: 2019-03-21 | End: 2019-03-28 | Stop reason: SDUPTHER

## 2019-03-21 RX ORDER — ACETAMINOPHEN 325 MG/1
650 TABLET ORAL EVERY 4 HOURS PRN
Status: DISCONTINUED | OUTPATIENT
Start: 2019-03-21 | End: 2019-04-06 | Stop reason: HOSPADM

## 2019-03-21 RX ORDER — MORPHINE SULFATE 2 MG/ML
2 INJECTION, SOLUTION INTRAMUSCULAR; INTRAVENOUS
Status: DISCONTINUED | OUTPATIENT
Start: 2019-03-21 | End: 2019-04-06 | Stop reason: HOSPADM

## 2019-03-21 RX ORDER — ONDANSETRON 2 MG/ML
4 INJECTION INTRAMUSCULAR; INTRAVENOUS EVERY 6 HOURS PRN
Status: DISCONTINUED | OUTPATIENT
Start: 2019-03-21 | End: 2019-04-06 | Stop reason: HOSPADM

## 2019-03-21 RX ADMIN — MORPHINE SULFATE 4 MG: 4 INJECTION INTRAVENOUS at 06:24

## 2019-03-21 RX ADMIN — FAMOTIDINE 20 MG: 10 INJECTION, SOLUTION INTRAVENOUS at 01:32

## 2019-03-21 RX ADMIN — FAMOTIDINE 20 MG: 10 INJECTION, SOLUTION INTRAVENOUS at 20:46

## 2019-03-21 RX ADMIN — MORPHINE SULFATE 4 MG: 4 INJECTION INTRAVENOUS at 22:13

## 2019-03-21 RX ADMIN — ONDANSETRON 4 MG: 2 INJECTION INTRAMUSCULAR; INTRAVENOUS at 16:40

## 2019-03-21 RX ADMIN — MORPHINE SULFATE 4 MG: 4 INJECTION INTRAVENOUS at 11:11

## 2019-03-21 RX ADMIN — MORPHINE SULFATE 4 MG: 4 INJECTION INTRAVENOUS at 14:33

## 2019-03-21 RX ADMIN — SODIUM CHLORIDE: 9 INJECTION, SOLUTION INTRAVENOUS at 08:30

## 2019-03-21 RX ADMIN — MORPHINE SULFATE 4 MG: 4 INJECTION INTRAVENOUS at 16:40

## 2019-03-21 RX ADMIN — IOPAMIDOL 75 ML: 755 INJECTION, SOLUTION INTRAVENOUS at 09:35

## 2019-03-21 RX ADMIN — MORPHINE SULFATE 4 MG: 4 INJECTION INTRAVENOUS at 19:20

## 2019-03-21 RX ADMIN — MORPHINE SULFATE 4 MG: 4 INJECTION INTRAVENOUS at 01:32

## 2019-03-21 RX ADMIN — FAMOTIDINE 20 MG: 10 INJECTION, SOLUTION INTRAVENOUS at 08:30

## 2019-03-21 RX ADMIN — ENOXAPARIN SODIUM 40 MG: 40 INJECTION SUBCUTANEOUS at 08:30

## 2019-03-21 RX ADMIN — Medication 10 ML: at 20:46

## 2019-03-21 RX ADMIN — ONDANSETRON 4 MG: 2 INJECTION INTRAMUSCULAR; INTRAVENOUS at 22:13

## 2019-03-21 RX ADMIN — MORPHINE SULFATE 4 MG: 4 INJECTION INTRAVENOUS at 08:30

## 2019-03-21 RX ADMIN — SODIUM CHLORIDE: 9 INJECTION, SOLUTION INTRAVENOUS at 01:32

## 2019-03-21 RX ADMIN — SODIUM CHLORIDE, PRESERVATIVE FREE 10 ML: 5 INJECTION INTRAVENOUS at 01:32

## 2019-03-21 ASSESSMENT — PAIN SCALES - GENERAL
PAINLEVEL_OUTOF10: 10
PAINLEVEL_OUTOF10: 0
PAINLEVEL_OUTOF10: 3
PAINLEVEL_OUTOF10: 10
PAINLEVEL_OUTOF10: 8
PAINLEVEL_OUTOF10: 7
PAINLEVEL_OUTOF10: 0
PAINLEVEL_OUTOF10: 7
PAINLEVEL_OUTOF10: 10
PAINLEVEL_OUTOF10: 9
PAINLEVEL_OUTOF10: 10
PAINLEVEL_OUTOF10: 0
PAINLEVEL_OUTOF10: 10

## 2019-03-21 ASSESSMENT — PAIN DESCRIPTION - LOCATION
LOCATION: ABDOMEN

## 2019-03-21 ASSESSMENT — PAIN DESCRIPTION - PAIN TYPE
TYPE: SURGICAL PAIN
TYPE: SURGICAL PAIN
TYPE: ACUTE PAIN
TYPE: SURGICAL PAIN
TYPE: SURGICAL PAIN

## 2019-03-21 ASSESSMENT — PAIN DESCRIPTION - ORIENTATION
ORIENTATION: LOWER

## 2019-03-21 ASSESSMENT — PAIN DESCRIPTION - FREQUENCY: FREQUENCY: INTERMITTENT

## 2019-03-21 ASSESSMENT — PAIN DESCRIPTION - PROGRESSION: CLINICAL_PROGRESSION: NOT CHANGED

## 2019-03-21 ASSESSMENT — PAIN DESCRIPTION - DESCRIPTORS: DESCRIPTORS: ACHING;SHARP

## 2019-03-21 NOTE — ED NOTES
PS general surg @ 2201  RE: post-op cholectomy,  vomiting unable to keep antibiotics and pain medications down.  Consult per CAROLYNE Macias Dr. called back @ [id number] [facility]  03/20/19 8720

## 2019-03-21 NOTE — CARE COORDINATION
CASE MANAGEMENT INITIAL ASSESSMENT      Reviewed chart and met with patient today, re: discharge planning  Explained Case Management role/services. Family present: none  Primary contact information:mother, Yulisa Sarabia [phone]     Admit date/status: 3/20 Inpatient  Diagnosis: Post op nausea/vomiting    Insurance: Medicare, Medicaid  Precert required for SNF - N        3 night stay required - Y    Living arrangements, Adls, care needs, prior to admission: Pt lives at home with family, has 24hr assist, active with [address] [facility] states she has all DME she needs. Transportation: family     PT/OT recs: not ordered    Barriers to discharge: none noted    Plan/comments: Plans to return home with family support and [facility]. [facility] liaison notified of admission. Will continue to follow and coordinate discharge arrangements.   PETER Hillman-ASHLEY
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
## 2019-03-21 NOTE — PLAN OF CARE
Nutrition Problem: Increased nutrient needs(protein)  Intervention: Food and/or Nutrient Delivery: Continue NPO, Start oral diet, Start ONS(when medically feasible)  Nutritional Goals: Pt will advance to PO diet with intakes of 50% or greater and no s/s of N/V

## 2019-03-21 NOTE — PLAN OF CARE
Pt. Has non-skid socks on, call light in reach, side rails up x-2, bed alarm on, patient instructed to use call light with concerns and questions. Pt. Is alert and oriented x-4. Pt. Is a paraplegic & a Q-2 turn as tolerated. Will continue to monitor.
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
## 2019-03-21 NOTE — CARE COORDINATION
[facility]    Writer received notification from RN BHARATH regarding pt's admission. Pt is active with [facility] for SN. Pt previously DC'ed home with [facility] on 3/14 with a new colostomy agency has also been assisting with pt's pressure ulcers. Writer has reviewed General Surgeries H&P. I will continue to follow patient for needs and arrange for [facility] with [facility] upon [address] home. Should pt dc over the weekend please fax facesheet, H&P, wound care's note, order for home care, and LOUIS to [facility]. Update 1:15 per SN with [facility]: New DTI appeared between the last 2 SNV. Other DTIs opened up between sn visits (originally closed)   Writer updated pt's RN and has been updated the pt was seen by the wound care nurse earlier this am.   Update 2:30- sticky note to MD: Pt reported to OT with [facility] on 3/20 that she has not been taking her antibiotic because she has not been able to swallow. Pt has a hospital bed at home and was very uncomfortable with HOB elevated to 45 degrees. UPDATE 3P- Spoke with pt at bedside about JOSSIE with [facility], all questions answered. Pt states she would like the same nurses and she is agreeable to PT/OT evaluation again, has been seen by both therapies. Pt did request for a different HHA with [facility], MCS updated for pt's request. Pt aware agency will call to schedule JOSSIE by 5p on day of DC or the following mid-morning/afternoon. Pt is agreeable to having a JOSSIE within 2 days of DC. Demographics verified. Orders will be faxed to [facility]. Should pt dc over the weekend please fax facesheet, order, LOUIS, and H&P to [facility].      Mk Pina RN CTN with [facility]  CXT:[phone]

## 2019-03-21 NOTE — PROGRESS NOTES
4 Eyes Skin Assessment     The patient is being assess for   Admission    I agree that 2 RN's have performed a thorough Head to Toe Skin Assessment on the patient. ALL assessment sites listed below have been assessed. Areas assessed by both nurses:   [x]   Head, Face, and Ears   [x]   Shoulders, Back, and Chest, Abdomen  [x]   Arms, Elbows, and Hands   [x]   Coccyx, Sacrum, and Ischium  [x]   Legs, Feet, and Heels            **SHARE this note so that the co-signing nurse is able to place an eSignature**    Co-signer eSignature: Electronically signed by Scott Sood RN on 3/21/19 at 2:03 AM    Does the Patient have Skin Breakdown?   Yes LDA WOUND CARE was Initiated documentation include the Ester-wound, Wound Assessment, Measurements, Dressing Treatment, Drainage, and Color\",          Tom Prevention initiated:  Yes   Wound Care Orders initiated:  Yes      [address]  nurse consulted for Pressure Injury (Stage 3,4, Unstageable, DTI, NWPT, Complex wounds)and New or Established Ostomies:  Yes      Primary Nurse eSignature: Electronically signed by Scotty Winn RN on 3/21/19 at 1:50 AM

## 2019-03-21 NOTE — PROGRESS NOTES
Increased demand for energy/nutrients     Signs and symptoms:  as evidenced by Presence of wounds    Objective Information:  · Nutrition-Focused Physical Findings: Paraplegic. Multiple pressure wounds. +1 BLE edema. Distended abdomen.  Colostomy  · Wound Type: Multiple, Pressure Ulcer, Stage II, Stage III(Sacrum, R heel, Buttock)  · Current Nutrition Therapies:  · Oral Diet Orders: NPO   · Oral Diet intake: NPO  · Oral Nutrition Supplement (ONS) Orders: None  · Anthropometric Measures:  · Ht: 4' 10\" (147.3 cm)   · Current Body Wt: 113 lb (51.3 kg)  · % Weight Change:  No weight change per EMR  · Ideal Body Wt: 96 lb (43.5 kg)   · BMI Classification: BMI 18.5 - 24.9 Normal Weight    Nutrition Interventions:   Continue NPO, Start oral diet, Start ONS(when medically feasible)  Continued Inpatient Monitoring    Nutrition Evaluation:   · Evaluation: Progressing toward goals   · Goals: Pt will advance to PO diet with intakes of 50% or greater and no s/s of N/V    · Monitoring: Meal Intake, Supplement Intake, Nutrition Progression, Weight, Pertinent Labs, Nausea or Vomiting      Electronically signed by Gary Saleh RD, LD on 3/21/19 at 2:32 PM    Contact Number: Office: [phone]; [address]: [address]

## 2019-03-21 NOTE — PROGRESS NOTES
Patient admitted to room 535 from ER. Patient oriented to room, call light, bed rails, phone, lights and bathroom. Patient instructed about the schedule of the day including: vital sign frequency, lab draws, possible tests, frequency of MD and staff rounds, including RN/MD rounding together at bedside, daily weights, and I &O's. Patient instructed about prescribed diet, how to use 8MENU, and television. bed alarm in place, patient aware of placement and reason. Bed locked, in lowest position, side rails up 2/4, call light within reach. Will continue to monitor.

## 2019-03-22 PROCEDURE — 6360000002 HC RX W HCPCS: Performed by: SURGERY

## 2019-03-22 PROCEDURE — 97535 SELF CARE MNGMENT TRAINING: CPT

## 2019-03-22 PROCEDURE — 2500000003 HC RX 250 WO HCPCS: Performed by: SURGERY

## 2019-03-22 PROCEDURE — 97167 OT EVAL HIGH COMPLEX 60 MIN: CPT

## 2019-03-22 PROCEDURE — 97110 THERAPEUTIC EXERCISES: CPT

## 2019-03-22 PROCEDURE — 6370000000 HC RX 637 (ALT 250 FOR IP): Performed by: CLINICAL NURSE SPECIALIST

## 2019-03-22 PROCEDURE — 2580000003 HC RX 258: Performed by: SURGERY

## 2019-03-22 PROCEDURE — 97163 PT EVAL HIGH COMPLEX 45 MIN: CPT

## 2019-03-22 PROCEDURE — 99232 SBSQ HOSP IP/OBS MODERATE 35: CPT | Performed by: SURGERY

## 2019-03-22 PROCEDURE — 1200000000 HC SEMI PRIVATE

## 2019-03-22 RX ADMIN — MORPHINE SULFATE 4 MG: 4 INJECTION INTRAVENOUS at 23:05

## 2019-03-22 RX ADMIN — MORPHINE SULFATE 4 MG: 4 INJECTION INTRAVENOUS at 07:03

## 2019-03-22 RX ADMIN — FAMOTIDINE 20 MG: 10 INJECTION, SOLUTION INTRAVENOUS at 21:11

## 2019-03-22 RX ADMIN — SODIUM CHLORIDE: 9 INJECTION, SOLUTION INTRAVENOUS at 12:38

## 2019-03-22 RX ADMIN — MORPHINE SULFATE 4 MG: 4 INJECTION INTRAVENOUS at 00:29

## 2019-03-22 RX ADMIN — MORPHINE SULFATE 4 MG: 4 INJECTION INTRAVENOUS at 19:04

## 2019-03-22 RX ADMIN — MORPHINE SULFATE 4 MG: 4 INJECTION INTRAVENOUS at 03:59

## 2019-03-22 RX ADMIN — FAMOTIDINE 20 MG: 10 INJECTION, SOLUTION INTRAVENOUS at 09:14

## 2019-03-22 RX ADMIN — SODIUM CHLORIDE: 9 INJECTION, SOLUTION INTRAVENOUS at 02:38

## 2019-03-22 RX ADMIN — MORPHINE SULFATE 4 MG: 4 INJECTION INTRAVENOUS at 14:18

## 2019-03-22 RX ADMIN — COLLAGENASE SANTYL 1 EACH: 250 OINTMENT TOPICAL at 12:16

## 2019-03-22 RX ADMIN — MORPHINE SULFATE 4 MG: 4 INJECTION INTRAVENOUS at 09:14

## 2019-03-22 RX ADMIN — Medication 10 ML: at 21:12

## 2019-03-22 RX ADMIN — MORPHINE SULFATE 4 MG: 4 INJECTION INTRAVENOUS at 11:51

## 2019-03-22 RX ADMIN — ENOXAPARIN SODIUM 40 MG: 40 INJECTION SUBCUTANEOUS at 09:14

## 2019-03-22 RX ADMIN — MORPHINE SULFATE 4 MG: 4 INJECTION INTRAVENOUS at 16:49

## 2019-03-22 ASSESSMENT — PAIN DESCRIPTION - FREQUENCY: FREQUENCY: CONTINUOUS

## 2019-03-22 ASSESSMENT — PAIN SCALES - GENERAL
PAINLEVEL_OUTOF10: 9
PAINLEVEL_OUTOF10: 9
PAINLEVEL_OUTOF10: 8
PAINLEVEL_OUTOF10: 0
PAINLEVEL_OUTOF10: 9
PAINLEVEL_OUTOF10: 9
PAINLEVEL_OUTOF10: 10
PAINLEVEL_OUTOF10: 9
PAINLEVEL_OUTOF10: 9
PAINLEVEL_OUTOF10: 8
PAINLEVEL_OUTOF10: 10
PAINLEVEL_OUTOF10: 9
PAINLEVEL_OUTOF10: 9
PAINLEVEL_OUTOF10: 0

## 2019-03-22 ASSESSMENT — PAIN DESCRIPTION - DESCRIPTORS: DESCRIPTORS: ACHING;SHARP

## 2019-03-22 ASSESSMENT — PAIN DESCRIPTION - LOCATION
LOCATION: ABDOMEN;SHOULDER
LOCATION: ABDOMEN

## 2019-03-22 ASSESSMENT — PAIN DESCRIPTION - PAIN TYPE
TYPE: SURGICAL PAIN;ACUTE PAIN
TYPE: SURGICAL PAIN

## 2019-03-22 ASSESSMENT — PAIN DESCRIPTION - ORIENTATION
ORIENTATION: RIGHT
ORIENTATION: RIGHT
ORIENTATION: LOWER;MID
ORIENTATION: LOWER;MID

## 2019-03-22 ASSESSMENT — PAIN DESCRIPTION - ONSET: ONSET: ON-GOING

## 2019-03-22 ASSESSMENT — PAIN DESCRIPTION - PROGRESSION: CLINICAL_PROGRESSION: NOT CHANGED

## 2019-03-22 NOTE — CONSULTS
°C) (Oral)   Resp 16   Ht 4' 10\" (1.473 m)   Wt 113 lb (51.3 kg)   LMP 02/05/2019   SpO2 99%   BMI 23.62 kg/m²     LABS:  WBC:    Lab Results   Component Value Date    WBC 10.8 03/21/2019     H/H:    Lab Results   Component Value Date    HGB 7.9 03/21/2019    HCT 23.7 03/21/2019     PTT:  No results found for: APTT, PTT[APTT}  PT/INR:    Lab Results   Component Value Date    PROTIME 17.8 03/08/2019    INR 1.56 03/08/2019     HgBA1c:  No results found for: LABA1C    Assessment   Tom Risk Score: Tom Scale Score: 12    Patient Active Problem List   Diagnosis Code    S/P partial colectomy Z90.49    Perforated viscus R19.8    Acute hypoxemic respiratory failure (HCC) J96.01    Methemoglobinemia D74.9    Post-traumatic paraplegia T14.90XS    E-coli UTI N39.0, B96.20    Subphrenic abscess (HCC) K65.1    Moderate malnutrition (HCC) E44.0    Post-operative nausea and vomiting R11.2, Z98.890    Generalized abdominal pain R10.84       Measurements:  Wound 03/06/19 Ischium DTI R Lower Buttock - this is ischium per WOCN 3/13/19 (Active)   Wound Image   3/13/2019  3:13 PM   Wound Pressure Stage  2 3/21/2019 12:15 PM   Dressing Status Clean;Dry; Intact 3/14/2019  6:16 PM   Dressing Changed Dressing reinforced 3/14/2019  6:16 PM   Dressing/Treatment Open to air 3/21/2019 12:15 PM   Wound Cleansed Not Cleansed 3/21/2019 12:45 AM   Wound Length (cm) 2 cm 3/21/2019 12:15 PM   Wound Width (cm) 3 cm 3/21/2019 12:15 PM   Wound Depth (cm) 0 cm 3/13/2019  3:13 PM   Wound Surface Area (cm^2) 6 cm^2 3/21/2019 12:15 PM   Change in Wound Size % (l*w) -3900 3/21/2019 12:15 PM   Wound Volume (cm^3) 0 cm^3 3/13/2019  3:13 PM   Distance Tunneling (cm) 0 cm 3/13/2019  3:13 PM   Tunneling Position ___ O'Clock 0 3/13/2019  3:13 PM   Undermining Starts ___ O'Clock 0 3/13/2019  3:13 PM   Undermining Ends___ O'Clock 0 3/13/2019  3:13 PM   Undermining Maxium Distance (cm) 0 3/13/2019  3:13 PM   Wound Assessment Pink 3/21/2019 12:15 PM   Drainage Amount None 3/21/2019 12:15 PM   Drainage Description Clear 3/14/2019  6:16 PM   Odor Mild 3/21/2019  9:00 AM   Margins Attached edges; Defined edges 3/21/2019  9:00 AM   Ester-wound Assessment Red; Swelling 3/21/2019  9:00 AM   Non-staged Wound Description Not applicable 2/20/0745  3:81 PM   Purple%Wound Bed 100 3/13/2019  3:13 PM   Culture Taken No 3/21/2019 12:45 AM   Number of days: 14       Wound 03/09/19 Sacrum (Active)   Wound Image   3/21/2019 12:15 PM   Wound Pressure Unstageable 3/21/2019 12:15 PM   Dressing Status Intact 3/21/2019 12:15 PM   Dressing Changed Changed/New 3/21/2019  9:00 AM   Dressing/Treatment Foam 3/21/2019 12:15 PM   Wound Cleansed Not Cleansed 3/21/2019 12:45 AM   Wound Length (cm) 5 cm 3/21/2019 12:15 PM   Wound Width (cm) 6 cm 3/21/2019 12:15 PM   Wound Depth (cm) 0.2 cm 3/21/2019 12:15 PM   Wound Surface Area (cm^2) 30 cm^2 3/21/2019 12:15 PM   Change in Wound Size % (l*w) -3900 3/21/2019 12:15 PM   Wound Volume (cm^3) 6 cm^3 3/21/2019 12:15 PM   Distance Tunneling (cm) 0 cm 3/13/2019  3:13 PM   Tunneling Position ___ O'Clock 0 3/13/2019  3:13 PM   Undermining Starts ___ O'Clock 0 3/13/2019  3:13 PM   Undermining Ends___ O'Clock 0 3/13/2019  3:13 PM   Undermining Maxium Distance (cm) 0 3/13/2019  3:13 PM   Wound Assessment Maroon; Purple 3/21/2019 12:15 PM   Drainage Amount Moderate 3/21/2019 12:15 PM   Drainage Description Serosanguinous 3/21/2019 12:15 PM   Odor Mild 3/21/2019  9:00 AM   Margins Defined edges; Unattached edges 3/21/2019 12:15 PM   Ester-wound Assessment Open blisters;Painful;Red;Swelling 3/21/2019  9:00 AM   Purple%Wound Bed 100 3/13/2019  3:13 PM   Culture Taken No 3/21/2019 12:45 AM   Number of days: 11       Wound 03/09/19 Coccyx Left (Active)   Wound Image   3/13/2019  3:13 PM   Wound Pressure Stage  2 3/21/2019 12:45 AM   Dressing Status Clean;Dry; Intact 3/14/2019  6:16 PM   Dressing Changed Dressing reinforced 3/14/2019  6:16 PM   Dressing/Treatment Open to air 3/21/2019  9:00 AM   Wound Cleansed Not Cleansed 3/21/2019 12:45 AM   Dressing Change Due 03/14/19 3/13/2019  3:13 PM   Wound Length (cm) 4 cm 3/13/2019  3:13 PM   Wound Width (cm) 8 cm 3/13/2019  3:13 PM   Wound Depth (cm) 0.1 cm 3/13/2019  3:13 PM   Wound Surface Area (cm^2) 32 cm^2 3/13/2019  3:13 PM   Wound Volume (cm^3) 3.2 cm^3 3/13/2019  3:13 PM   Distance Tunneling (cm) 0 cm 3/13/2019  3:13 PM   Tunneling Position ___ O'Clock 0 3/13/2019  3:13 PM   Undermining Starts ___ O'Clock 0 3/13/2019  3:13 PM   Undermining Ends___ O'Clock 0 3/13/2019  3:13 PM   Undermining Maxium Distance (cm) 0 3/13/2019  3:13 PM   Wound Assessment Purple;Red;Swelling 3/21/2019  9:00 AM   Drainage Amount None 3/21/2019  9:00 AM   Drainage Description Serosanguinous 3/13/2019  3:13 PM   Odor None 3/21/2019  9:00 AM   Margins Unattached edges; Defined edges 3/21/2019  9:00 AM   Ester-wound Assessment Intact; Red;Swelling 3/21/2019  9:00 AM   Non-staged Wound Description Partial thickness 3/13/2019  3:13 PM   Red%Wound Bed 40 3/13/2019  3:13 PM   Purple%Wound Bed 60 3/13/2019  3:13 PM   Culture Taken No 3/21/2019 12:45 AM   Number of days: 11       Wound 03/21/19 Heel Right (Active)   Wound Image   3/21/2019 12:15 PM   Wound Pressure Stage  1 3/21/2019 12:15 PM   Dressing Status Clean;Dry; Intact 3/21/2019 12:15 PM   Dressing/Treatment Foam 3/21/2019 12:15 PM   Wound Cleansed Not Cleansed 3/21/2019 12:45 AM   Wound Length (cm) 1 cm 3/21/2019 12:15 PM   Wound Width (cm) 2 cm 3/21/2019 12:15 PM   Wound Surface Area (cm^2) 2 cm^2 3/21/2019 12:15 PM   Wound Assessment Intact; Non-blanchable erythema;Pink 3/21/2019 12:15 PM   Drainage Amount None 3/21/2019 12:15 PM   Odor None 3/21/2019  9:00 AM   Ester-wound Assessment Red; Swelling 3/21/2019  9:00 AM   Geiger%Wound Bed 100 3/21/2019 12:15 PM   Culture Taken No 3/21/2019 12:45 AM   Number of days: 0       Wound 03/20/19 Heel Left;Posterior Stage 1 Left heel (Active)   Wound Image 3/14/2019  2:00 PM   Dressing Status Intact; Reinforced 3/21/2019  9:00 AM   Number of days: 16         Response to treatment:  Well tolerated by patient. Plan   Plan of Care: Wound 03/09/19 Sacrum-Dressing/Treatment: Foam  Wound 03/06/19 Ischium DTI R Lower Buttock - this is ischium per WOCN 3/13/19-Dressing/Treatment: Open to air  Wound 03/09/19 Coccyx Left-Dressing/Treatment: Open to air  Wound 03/21/19 Heel Right-Dressing/Treatment: Foam  Incision 03/01/19 Abdomen Medial;Mid-Dressing/Treatment: Open to air  Incision 03/05/19 Abdomen-Dressing/Treatment: Dry Dressing  Wound 03/20/19 Heel Left;Posterior Stage 1 Left heel-Dressing/Treatment: Foam  Wound 03/20/19 Hip Left;Lateral Left Trochanter Stagte 1-Dressing/Treatment: Foam    Specialty Bed Required : Yes   [x] Low Air Loss   [x] Pressure Redistribution  [] Fluid Immersion  [] Bariatric  [] Total Pressure Relief  [] Other:     Current Diet: DIET LOW FIBER;   Dietician consult:  Yes    Discharge Plan:  Placement for patient upon discharge: home with support    Patient appropriate for Outpatient [address]: Yes    Referrals:  []   [x] [facility]  [] Supplies  [x] Other-Discharge Plannning    Patient/Caregiver Teaching:  Level of patient/caregiver understanding able to:   [x] Indicates understanding       [x] Needs reinforcement  [] Unsuccessful      [] Verbal Understanding  [] Demonstrated understanding       [] No evidence of learning  [] Refused teaching         [] N/A       Electronically signed by Yadiel Zamudio RN, CWOCN on 3/21/2019 at 9:15 PM
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
## 2019-03-22 NOTE — PROGRESS NOTES
[facility] SURGERY    PATIENT NAME: Lacy Tristan     TODAY'S DATE: 3/22/2019    CHIEF COMPLAINT: lower abdominal pain    INTERVAL HISTORY/HPI:    Pt still c/o lower abdominal pain. Taking PO liquids well and some solid food. REVIEW OF SYSTEMS:  Pertinent positives and negatives as per interval history section    OBJECTIVE:  VITALS:  BP (!) 83/53   Pulse 102   Temp 99.4 °F (37.4 °C) (Oral)   Resp 16   Ht 4' 10\" (1.473 m)   Wt 113 lb (51.3 kg)   LMP 02/05/2019   SpO2 95%   BMI 23.62 kg/m²     INTAKE/OUTPUT:    I/O last 3 completed shifts: In: 2643 [P.O.:240; I.V.:2403]  Out: 1322.5 [Urine:1250; Drains:72.5]  No intake/output data recorded. CONSTITUTIONAL:  awake and alert  LUNGS:  Respirations easy and unlabored,    CARD:  regular rate and rhythm  ABDOMEN:   , soft, distended, ostomy - formed stool in pouch     Data:  CBC:   Recent Labs     03/20/19 1956 03/21/19 0619   WBC 15.2* 10.8   HGB 9.1* 7.9*   HCT 27.6* 23.7*    367     BMP:    Recent Labs     03/20/19 1956 03/21/19 0619   * 137   K 3.9 3.7   CL 92* 105   CO2 29 27   BUN 5* 5*   CREATININE <0.5* <0.5*   GLUCOSE 109* 86     Hepatic:   Recent Labs     03/20/19 1956   AST 14*   ALT <5*   BILITOT <0.2   ALKPHOS 95     Mag:    No results for input(s): MG in the last 72 hours. Phos:   No results for input(s): PHOS in the last 72 hours. INR: No results for input(s): INR in the last 72 hours. Radiology Review:  *Imaging personally reviewed by me.           ASSESSMENT AND PLAN:  Nausea and vomiting s/p Block's procedure on 3/2/19 and intra-abdominal abscesses   - will review CT w/ Rads, ensure there are no further needs/options for abscess drainage catheter placements to more effectively drain her collections   - Wound Care - appreciate input from [address] team   - decrease IVF   - will consult PT/OT for input on overall rehab potential as well as possibilities for placement other than home (ie LTAC?)      MCKENZIE COTTER [id number]

## 2019-03-22 NOTE — PROGRESS NOTES
0.45 cm^2 3/22/2019 12:33 PM   Change in Wound Size % (l*w) 40 3/22/2019 12:33 PM   Wound Volume (cm^3) 0 cm^3 3/22/2019 12:33 PM   Distance Tunneling (cm) 0 cm 3/22/2019 12:33 PM   Tunneling Position ___ O'Clock 0 3/22/2019 12:33 PM   Undermining Starts ___ O'Clock 0 3/22/2019 12:33 PM   Undermining Ends___ O'Clock 0 3/22/2019 12:33 PM   Undermining Maxium Distance (cm) 0 3/22/2019 12:33 PM   Wound Assessment Black 3/22/2019 12:33 PM   Drainage Amount None 3/22/2019 12:33 PM   Drainage Description Serosanguinous 3/22/2019  9:18 AM   Odor Mild 3/22/2019  9:18 AM   Margins Attached edges; Defined edges 3/22/2019 12:33 PM   Ester-wound Assessment Clean;Dry; Intact 3/22/2019 12:33 PM   Black%Wound Bed 100 3/22/2019 12:33 PM   Purple%Wound Bed 100 3/13/2019  3:13 PM   Culture Taken No 3/22/2019 12:33 PM   Number of days: 12       Wound 03/09/19 Coccyx Right (Active)   Wound Image   3/22/2019 12:33 PM   Wound Pressure Unstageable 3/22/2019 12:33 PM   Dressing Status Changed 3/22/2019 12:33 PM   Dressing Changed Changed/New 3/22/2019 12:33 PM   Dressing/Treatment Moisturizing cream;Pharmaceutical agent (see MAR); Moist to moist;Dry Dressing;Medipore 3/22/2019 12:33 PM   Wound Cleansed Rinsed/Irrigated with saline 3/22/2019 12:33 PM   Dressing Change Due 03/23/19 3/22/2019 12:33 PM   Wound Length (cm) 8.5 cm 3/22/2019 12:33 PM   Wound Width (cm) 5 cm 3/22/2019 12:33 PM   Wound Depth (cm) 0.1 cm 3/22/2019 12:33 PM   Wound Surface Area (cm^2) 42.5 cm^2 3/22/2019 12:33 PM   Change in Wound Size % (l*w) -32.81 3/22/2019 12:33 PM   Wound Volume (cm^3) 4.25 cm^3 3/22/2019 12:33 PM   Wound Healing % -33 3/22/2019 12:33 PM   Distance Tunneling (cm) 0 cm 3/22/2019 12:33 PM   Tunneling Position ___ O'Clock 0 3/22/2019 12:33 PM   Undermining Starts ___ O'Clock 0 3/22/2019 12:33 PM   Undermining Ends___ O'Clock 0 3/22/2019 12:33 PM   Undermining Maxium Distance (cm) 0 3/22/2019 12:33 PM   Wound Assessment Red;Yellow;Black 3/22/2019 12:33 11:27 AM   Stool Amount Small 3/22/2019 11:27 AM   Output (mL) 5 ml 3/14/2019  1:21 PM   Number of days: 20       Response to treatment:  Well tolerated by patient. Pain Assessment:  Severity:  5 / 10  Quality of pain: sharp right shoulder  Wound Pain Timing/Severity: none  Premedicated: Yes  Plan:   Plan of Care: Wound 03/09/19 Sacrum-Dressing/Treatment: Open to air  Wound 03/06/19 Ischium  ischium - evolved to stage 2-Dressing/Treatment: Foam  Wound 03/09/19 Coccyx Right-Dressing/Treatment: Moisturizing cream, Pharmaceutical agent (see MAR), Moist to moist, Dry Dressing, Medipore(Zinc to fredo, santyl, moist to moist, dry dsg, medipore)  Wound 03/21/19 Heel Right-Dressing/Treatment: Foam(heel mepilex applied 3/21/19)  [REMOVED] Incision 03/01/19 Abdomen Medial;Mid-Dressing/Treatment: Open to air  Incision 03/05/19 Abdomen Mid;Distal-Dressing/Treatment: Moist to moist  Wound 03/20/19 Heel Left;Posterior Stage 1 Left heel-Dressing/Treatment: Foam(heel mepilex applied 3/21/19)  Wound 03/20/19 Hip Left;Lateral Left Trochanter Stagte 1-Dressing/Treatment: Open to air     Recommend:  Clean all wounds with NSS. Apply zinc barrier around mid coccyx extending to right buttocks to protect fredo wound. Apply santyl to black eschar mid coccxy/right buttocks, cover with two 2x2 lightly moist NS soaked guaze, cover with dry 4x4 and secure with medipore tape daily. Right ischium cover with foam dressing change every 3 days. Right flank drains dressing change weekly with biopatch and tegaderm dressing. Left thigh foam dressing change every 3 days. Right and left heels foam dressing change every 3 days. First dressings none today. Negative pressure wound therapy (NPWT) dressing started today. dsg removed. Cleansed wound with NSS. Hydrocolloid and barrier film applied to open stay suture exit sited and around open dehisced incision. 4 black sponged applied into wound bed  Covered with VAC drape.   Connected to 125
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
## 2019-03-22 NOTE — PLAN OF CARE
Pt. Has non-skid socks on, call light in reach, side rails up x-2, bed alarm on, patient instructed to use call light with concerns and questions. Pt. Alert and oriented x-4. Pt. Is wheelchair bound at home & a Q-2 turn as tolerated. Will continue to monitor.

## 2019-03-23 LAB
ANION GAP SERPL CALCULATED.3IONS-SCNC: 7 MMOL/L (ref 3–16)
BASOPHILS ABSOLUTE: 0.1 K/UL (ref 0–0.2)
BASOPHILS RELATIVE PERCENT: 0.4 %
BUN BLDV-MCNC: 4 MG/DL (ref 7–20)
CALCIUM SERPL-MCNC: 6.9 MG/DL (ref 8.3–10.6)
CHLORIDE BLD-SCNC: 102 MMOL/L (ref 99–110)
CO2: 26 MMOL/L (ref 21–32)
CREAT SERPL-MCNC: <0.5 MG/DL (ref 0.6–1.1)
EOSINOPHILS ABSOLUTE: 0.1 K/UL (ref 0–0.6)
EOSINOPHILS RELATIVE PERCENT: 0.5 %
GFR AFRICAN AMERICAN: >60
GFR NON-AFRICAN AMERICAN: >60
GLUCOSE BLD-MCNC: 97 MG/DL (ref 70–99)
HCT VFR BLD CALC: 24.4 % (ref 36–48)
HEMOGLOBIN: 7.9 G/DL (ref 12–16)
LYMPHOCYTES ABSOLUTE: 1.7 K/UL (ref 1–5.1)
LYMPHOCYTES RELATIVE PERCENT: 12.6 %
MCH RBC QN AUTO: 31.2 PG (ref 26–34)
MCHC RBC AUTO-ENTMCNC: 32.4 G/DL (ref 31–36)
MCV RBC AUTO: 96.3 FL (ref 80–100)
MONOCYTES ABSOLUTE: 0.8 K/UL (ref 0–1.3)
MONOCYTES RELATIVE PERCENT: 6.3 %
NEUTROPHILS ABSOLUTE: 10.7 K/UL (ref 1.7–7.7)
NEUTROPHILS RELATIVE PERCENT: 80.2 %
PDW BLD-RTO: 17.5 % (ref 12.4–15.4)
PLATELET # BLD: 404 K/UL (ref 135–450)
PMV BLD AUTO: 8.2 FL (ref 5–10.5)
POTASSIUM SERPL-SCNC: 3.1 MMOL/L (ref 3.5–5.1)
RBC # BLD: 2.53 M/UL (ref 4–5.2)
SODIUM BLD-SCNC: 135 MMOL/L (ref 136–145)
WBC # BLD: 13.4 K/UL (ref 4–11)

## 2019-03-23 PROCEDURE — 2500000003 HC RX 250 WO HCPCS: Performed by: SURGERY

## 2019-03-23 PROCEDURE — 99024 POSTOP FOLLOW-UP VISIT: CPT | Performed by: SURGERY

## 2019-03-23 PROCEDURE — 6360000002 HC RX W HCPCS: Performed by: SURGERY

## 2019-03-23 PROCEDURE — 2580000003 HC RX 258: Performed by: SURGERY

## 2019-03-23 PROCEDURE — 85025 COMPLETE CBC W/AUTO DIFF WBC: CPT

## 2019-03-23 PROCEDURE — 36415 COLL VENOUS BLD VENIPUNCTURE: CPT

## 2019-03-23 PROCEDURE — 1200000000 HC SEMI PRIVATE

## 2019-03-23 PROCEDURE — 6370000000 HC RX 637 (ALT 250 FOR IP): Performed by: SURGERY

## 2019-03-23 PROCEDURE — 80048 BASIC METABOLIC PNL TOTAL CA: CPT

## 2019-03-23 RX ORDER — OXYCODONE HYDROCHLORIDE 5 MG/1
10 TABLET ORAL EVERY 4 HOURS PRN
Status: DISCONTINUED | OUTPATIENT
Start: 2019-03-23 | End: 2019-04-01

## 2019-03-23 RX ORDER — OXYCODONE HYDROCHLORIDE 5 MG/1
5 TABLET ORAL EVERY 4 HOURS PRN
Status: DISCONTINUED | OUTPATIENT
Start: 2019-03-23 | End: 2019-04-01

## 2019-03-23 RX ADMIN — SODIUM CHLORIDE: 9 INJECTION, SOLUTION INTRAVENOUS at 16:02

## 2019-03-23 RX ADMIN — MORPHINE SULFATE 4 MG: 4 INJECTION INTRAVENOUS at 01:37

## 2019-03-23 RX ADMIN — SODIUM CHLORIDE: 9 INJECTION, SOLUTION INTRAVENOUS at 01:39

## 2019-03-23 RX ADMIN — ENOXAPARIN SODIUM 40 MG: 40 INJECTION SUBCUTANEOUS at 09:34

## 2019-03-23 RX ADMIN — COLLAGENASE SANTYL: 250 OINTMENT TOPICAL at 09:35

## 2019-03-23 RX ADMIN — Medication 10 ML: at 20:49

## 2019-03-23 RX ADMIN — MORPHINE SULFATE 4 MG: 4 INJECTION INTRAVENOUS at 09:54

## 2019-03-23 RX ADMIN — FAMOTIDINE 20 MG: 10 INJECTION, SOLUTION INTRAVENOUS at 20:50

## 2019-03-23 RX ADMIN — MEROPENEM 1 G: 1 INJECTION, POWDER, FOR SOLUTION INTRAVENOUS at 21:55

## 2019-03-23 RX ADMIN — MORPHINE SULFATE 4 MG: 4 INJECTION INTRAVENOUS at 16:11

## 2019-03-23 RX ADMIN — MORPHINE SULFATE 4 MG: 4 INJECTION INTRAVENOUS at 07:34

## 2019-03-23 RX ADMIN — ONDANSETRON 4 MG: 2 INJECTION INTRAMUSCULAR; INTRAVENOUS at 16:02

## 2019-03-23 RX ADMIN — MORPHINE SULFATE 4 MG: 4 INJECTION INTRAVENOUS at 12:45

## 2019-03-23 RX ADMIN — MEROPENEM 1 G: 1 INJECTION, POWDER, FOR SOLUTION INTRAVENOUS at 16:03

## 2019-03-23 RX ADMIN — MORPHINE SULFATE 4 MG: 4 INJECTION INTRAVENOUS at 20:49

## 2019-03-23 RX ADMIN — FAMOTIDINE 20 MG: 10 INJECTION, SOLUTION INTRAVENOUS at 09:34

## 2019-03-23 ASSESSMENT — PAIN DESCRIPTION - PAIN TYPE: TYPE: SURGICAL PAIN

## 2019-03-23 ASSESSMENT — PAIN SCALES - GENERAL
PAINLEVEL_OUTOF10: 7
PAINLEVEL_OUTOF10: 7
PAINLEVEL_OUTOF10: 9
PAINLEVEL_OUTOF10: 10
PAINLEVEL_OUTOF10: 7
PAINLEVEL_OUTOF10: 7
PAINLEVEL_OUTOF10: 9
PAINLEVEL_OUTOF10: 9
PAINLEVEL_OUTOF10: 8

## 2019-03-23 ASSESSMENT — PAIN DESCRIPTION - LOCATION: LOCATION: ABDOMEN

## 2019-03-23 NOTE — PROGRESS NOTES
[facility] GENERAL SURGERY DAILY PROGRESS NOTE    SUBJECTIVE: Awake, alert    OBJECTIVE: CURRENT VITALS:  BP 99/65   Pulse 91   Temp 98.8 °F (37.1 °C) (Oral)   Resp 16   Ht 4' 10\" (1.473 m)   Wt 113 lb (51.3 kg)   LMP 02/05/2019   SpO2 97%   BMI 23.62 kg/m²          ABD: Soft. Some distention. COLOSTOMY:  Functional.    LABS:    CBC:   Recent Labs     03/20/19 1956 03/21/19 0619 03/23/19  0559   WBC 15.2* 10.8 13.4*   RBC 2.90* 2.46* 2.53*   HGB 9.1* 7.9* 7.9*   HCT 27.6* 23.7* 24.4*   MCV 95.1 96.3 96.3   RDW 17.1* 17.2* 17.5*    367 404     BMP:   Recent Labs     03/20/19 1956 03/21/19 0619 03/23/19  0559   * 137 135*   K 3.9 3.7 3.1*   CL 92* 105 102   CO2 29 27 26   BUN 5* 5* 4*   CREATININE <0.5* <0.5* <0.5*        ASSESSMENT AND PLAN:  Nausea and vomiting s/p Block's procedure on 3/2/19 and intra-abdominal abscesses  Continue antibiotics. Follow WBC. Repeat CT early in week.         Manju Blocker

## 2019-03-24 PROCEDURE — 6370000000 HC RX 637 (ALT 250 FOR IP): Performed by: SURGERY

## 2019-03-24 PROCEDURE — 2500000003 HC RX 250 WO HCPCS: Performed by: SURGERY

## 2019-03-24 PROCEDURE — 2580000003 HC RX 258: Performed by: SURGERY

## 2019-03-24 PROCEDURE — 1200000000 HC SEMI PRIVATE

## 2019-03-24 PROCEDURE — 6360000002 HC RX W HCPCS: Performed by: SURGERY

## 2019-03-24 PROCEDURE — 99024 POSTOP FOLLOW-UP VISIT: CPT | Performed by: SURGERY

## 2019-03-24 RX ORDER — POTASSIUM CHLORIDE 750 MG/1
10 TABLET, EXTENDED RELEASE ORAL 2 TIMES DAILY
Status: DISCONTINUED | OUTPATIENT
Start: 2019-03-24 | End: 2019-04-06 | Stop reason: HOSPADM

## 2019-03-24 RX ADMIN — MORPHINE SULFATE 4 MG: 4 INJECTION INTRAVENOUS at 09:45

## 2019-03-24 RX ADMIN — SODIUM CHLORIDE: 9 INJECTION, SOLUTION INTRAVENOUS at 22:10

## 2019-03-24 RX ADMIN — FAMOTIDINE 20 MG: 10 INJECTION, SOLUTION INTRAVENOUS at 20:25

## 2019-03-24 RX ADMIN — MORPHINE SULFATE 4 MG: 4 INJECTION INTRAVENOUS at 17:06

## 2019-03-24 RX ADMIN — Medication 10 ML: at 20:30

## 2019-03-24 RX ADMIN — MORPHINE SULFATE 4 MG: 4 INJECTION INTRAVENOUS at 22:46

## 2019-03-24 RX ADMIN — MORPHINE SULFATE 4 MG: 4 INJECTION INTRAVENOUS at 01:40

## 2019-03-24 RX ADMIN — FAMOTIDINE 20 MG: 10 INJECTION, SOLUTION INTRAVENOUS at 09:45

## 2019-03-24 RX ADMIN — MEROPENEM 1 G: 1 INJECTION, POWDER, FOR SOLUTION INTRAVENOUS at 05:53

## 2019-03-24 RX ADMIN — MORPHINE SULFATE 4 MG: 4 INJECTION INTRAVENOUS at 20:39

## 2019-03-24 RX ADMIN — ENOXAPARIN SODIUM 40 MG: 40 INJECTION SUBCUTANEOUS at 09:45

## 2019-03-24 RX ADMIN — POTASSIUM CHLORIDE 10 MEQ: 750 TABLET, EXTENDED RELEASE ORAL at 09:45

## 2019-03-24 RX ADMIN — MORPHINE SULFATE 4 MG: 4 INJECTION INTRAVENOUS at 05:53

## 2019-03-24 RX ADMIN — POTASSIUM CHLORIDE 10 MEQ: 750 TABLET, EXTENDED RELEASE ORAL at 20:25

## 2019-03-24 RX ADMIN — SODIUM CHLORIDE: 9 INJECTION, SOLUTION INTRAVENOUS at 05:53

## 2019-03-24 RX ADMIN — COLLAGENASE SANTYL: 250 OINTMENT TOPICAL at 11:18

## 2019-03-24 RX ADMIN — MEROPENEM 1 G: 1 INJECTION, POWDER, FOR SOLUTION INTRAVENOUS at 14:57

## 2019-03-24 RX ADMIN — MEROPENEM 1 G: 1 INJECTION, POWDER, FOR SOLUTION INTRAVENOUS at 23:54

## 2019-03-24 RX ADMIN — MORPHINE SULFATE 4 MG: 4 INJECTION INTRAVENOUS at 12:04

## 2019-03-24 ASSESSMENT — PAIN SCALES - GENERAL
PAINLEVEL_OUTOF10: 9
PAINLEVEL_OUTOF10: 8
PAINLEVEL_OUTOF10: 9
PAINLEVEL_OUTOF10: 9
PAINLEVEL_OUTOF10: 8
PAINLEVEL_OUTOF10: 8
PAINLEVEL_OUTOF10: 9
PAINLEVEL_OUTOF10: 8

## 2019-03-24 ASSESSMENT — PAIN DESCRIPTION - PROGRESSION
CLINICAL_PROGRESSION: NOT CHANGED

## 2019-03-24 ASSESSMENT — PAIN DESCRIPTION - PAIN TYPE: TYPE: ACUTE PAIN;SURGICAL PAIN

## 2019-03-24 ASSESSMENT — PAIN DESCRIPTION - LOCATION: LOCATION: ABDOMEN

## 2019-03-24 NOTE — PROGRESS NOTES
[facility] GENERAL SURGERY DAILY PROGRESS NOTE    SUBJECTIVE: Awake, alert    OBJECTIVE: CURRENT VITALS:  /62   Pulse 91   Temp 99.7 °F (37.6 °C) (Oral)   Resp 16   Ht 4' 10\" (1.473 m)   Wt 113 lb (51.3 kg)   LMP 02/05/2019   SpO2 95%   BMI 23.62 kg/m²          ABD: Soft. Distended. COLOSTOMY:  Functional.    LABS:    CBC:   Recent Labs     03/23/19  0559   WBC 13.4*   RBC 2.53*   HGB 7.9*   HCT 24.4*   MCV 96.3   RDW 17.5*        BMP:   Recent Labs     03/23/19  0559   *   K 3.1*      CO2 26   BUN 4*   CREATININE <0.5*        ASSESSMENT AND PLAN:  Nausea and vomiting s/p Block's procedure on 3/2/19 and intra-abdominal abscesses  Continue antibiotics. Follow WBC. Repeat CT early in week.           [address] [facility]

## 2019-03-25 ENCOUNTER — APPOINTMENT (OUTPATIENT)
Dept: INTERVENTIONAL RADIOLOGY/VASCULAR | Age: 29
DRG: 862 | End: 2019-03-25
Payer: MEDICARE

## 2019-03-25 LAB
ANION GAP SERPL CALCULATED.3IONS-SCNC: 6 MMOL/L (ref 3–16)
BASOPHILS ABSOLUTE: 0.1 K/UL (ref 0–0.2)
BASOPHILS RELATIVE PERCENT: 0.9 %
BUN BLDV-MCNC: 4 MG/DL (ref 7–20)
CALCIUM SERPL-MCNC: 7.3 MG/DL (ref 8.3–10.6)
CHLORIDE BLD-SCNC: 106 MMOL/L (ref 99–110)
CO2: 26 MMOL/L (ref 21–32)
CREAT SERPL-MCNC: <0.5 MG/DL (ref 0.6–1.1)
EOSINOPHILS ABSOLUTE: 0.1 K/UL (ref 0–0.6)
EOSINOPHILS RELATIVE PERCENT: 1.1 %
GFR AFRICAN AMERICAN: >60
GFR NON-AFRICAN AMERICAN: >60
GLUCOSE BLD-MCNC: 85 MG/DL (ref 70–99)
HCT VFR BLD CALC: 27.5 % (ref 36–48)
HEMOGLOBIN: 8.8 G/DL (ref 12–16)
LYMPHOCYTES ABSOLUTE: 2.1 K/UL (ref 1–5.1)
LYMPHOCYTES RELATIVE PERCENT: 16.7 %
MAGNESIUM: 1.8 MG/DL (ref 1.8–2.4)
MCH RBC QN AUTO: 31.2 PG (ref 26–34)
MCHC RBC AUTO-ENTMCNC: 32.2 G/DL (ref 31–36)
MCV RBC AUTO: 97 FL (ref 80–100)
MONOCYTES ABSOLUTE: 0.8 K/UL (ref 0–1.3)
MONOCYTES RELATIVE PERCENT: 6.1 %
NEUTROPHILS ABSOLUTE: 9.4 K/UL (ref 1.7–7.7)
NEUTROPHILS RELATIVE PERCENT: 75.2 %
PDW BLD-RTO: 18.2 % (ref 12.4–15.4)
PHOSPHORUS: 2.5 MG/DL (ref 2.5–4.9)
PLATELET # BLD: 442 K/UL (ref 135–450)
PMV BLD AUTO: 7.8 FL (ref 5–10.5)
POTASSIUM SERPL-SCNC: 3.6 MMOL/L (ref 3.5–5.1)
RBC # BLD: 2.83 M/UL (ref 4–5.2)
SODIUM BLD-SCNC: 138 MMOL/L (ref 136–145)
WBC # BLD: 12.5 K/UL (ref 4–11)

## 2019-03-25 PROCEDURE — 85025 COMPLETE CBC W/AUTO DIFF WBC: CPT

## 2019-03-25 PROCEDURE — 80048 BASIC METABOLIC PNL TOTAL CA: CPT

## 2019-03-25 PROCEDURE — 36573 INSJ PICC RS&I 5 YR+: CPT

## 2019-03-25 PROCEDURE — 02HV33Z INSERTION OF INFUSION DEVICE INTO SUPERIOR VENA CAVA, PERCUTANEOUS APPROACH: ICD-10-PCS | Performed by: SURGERY

## 2019-03-25 PROCEDURE — 2500000003 HC RX 250 WO HCPCS: Performed by: SURGERY

## 2019-03-25 PROCEDURE — 1200000000 HC SEMI PRIVATE

## 2019-03-25 PROCEDURE — C1751 CATH, INF, PER/CENT/MIDLINE: HCPCS

## 2019-03-25 PROCEDURE — 84100 ASSAY OF PHOSPHORUS: CPT

## 2019-03-25 PROCEDURE — 2580000003 HC RX 258: Performed by: SURGERY

## 2019-03-25 PROCEDURE — 6360000002 HC RX W HCPCS: Performed by: SURGERY

## 2019-03-25 PROCEDURE — 6370000000 HC RX 637 (ALT 250 FOR IP): Performed by: SURGERY

## 2019-03-25 PROCEDURE — 97605 NEG PRS WND THER DME<=50SQCM: CPT

## 2019-03-25 PROCEDURE — 99024 POSTOP FOLLOW-UP VISIT: CPT | Performed by: SURGERY

## 2019-03-25 PROCEDURE — APPNB45 APP NON BILLABLE 31-45 MINUTES: Performed by: CLINICAL NURSE SPECIALIST

## 2019-03-25 PROCEDURE — 36415 COLL VENOUS BLD VENIPUNCTURE: CPT

## 2019-03-25 PROCEDURE — 83735 ASSAY OF MAGNESIUM: CPT

## 2019-03-25 RX ORDER — LIDOCAINE HYDROCHLORIDE 10 MG/ML
5 INJECTION, SOLUTION INFILTRATION; PERINEURAL ONCE
Status: COMPLETED | OUTPATIENT
Start: 2019-03-25 | End: 2019-03-25

## 2019-03-25 RX ORDER — SODIUM CHLORIDE 0.9 % (FLUSH) 0.9 %
10 SYRINGE (ML) INJECTION PRN
Status: DISCONTINUED | OUTPATIENT
Start: 2019-03-25 | End: 2019-04-06 | Stop reason: HOSPADM

## 2019-03-25 RX ORDER — SODIUM CHLORIDE 0.9 % (FLUSH) 0.9 %
10 SYRINGE (ML) INJECTION EVERY 12 HOURS SCHEDULED
Status: DISCONTINUED | OUTPATIENT
Start: 2019-03-25 | End: 2019-04-06 | Stop reason: HOSPADM

## 2019-03-25 RX ORDER — POLYETHYLENE GLYCOL 3350 17 G/17G
17 POWDER, FOR SOLUTION ORAL DAILY
Status: DISCONTINUED | OUTPATIENT
Start: 2019-03-25 | End: 2019-04-06 | Stop reason: HOSPADM

## 2019-03-25 RX ADMIN — MORPHINE SULFATE 4 MG: 4 INJECTION INTRAVENOUS at 12:32

## 2019-03-25 RX ADMIN — MORPHINE SULFATE 4 MG: 4 INJECTION INTRAVENOUS at 15:32

## 2019-03-25 RX ADMIN — MEROPENEM 1 G: 1 INJECTION, POWDER, FOR SOLUTION INTRAVENOUS at 15:23

## 2019-03-25 RX ADMIN — POTASSIUM CHLORIDE 10 MEQ: 750 TABLET, EXTENDED RELEASE ORAL at 21:15

## 2019-03-25 RX ADMIN — FAMOTIDINE 20 MG: 10 INJECTION, SOLUTION INTRAVENOUS at 21:15

## 2019-03-25 RX ADMIN — MEROPENEM 1 G: 1 INJECTION, POWDER, FOR SOLUTION INTRAVENOUS at 22:46

## 2019-03-25 RX ADMIN — MEROPENEM 1 G: 1 INJECTION, POWDER, FOR SOLUTION INTRAVENOUS at 06:57

## 2019-03-25 RX ADMIN — MORPHINE SULFATE 4 MG: 4 INJECTION INTRAVENOUS at 21:13

## 2019-03-25 RX ADMIN — MORPHINE SULFATE 4 MG: 4 INJECTION INTRAVENOUS at 09:56

## 2019-03-25 RX ADMIN — COLLAGENASE SANTYL: 250 OINTMENT TOPICAL at 10:01

## 2019-03-25 RX ADMIN — POLYETHYLENE GLYCOL 3350 17 G: 17 POWDER, FOR SOLUTION ORAL at 15:23

## 2019-03-25 RX ADMIN — FAMOTIDINE 20 MG: 10 INJECTION, SOLUTION INTRAVENOUS at 09:58

## 2019-03-25 RX ADMIN — MORPHINE SULFATE 4 MG: 4 INJECTION INTRAVENOUS at 06:08

## 2019-03-25 RX ADMIN — POTASSIUM CHLORIDE 10 MEQ: 750 TABLET, EXTENDED RELEASE ORAL at 09:58

## 2019-03-25 RX ADMIN — LIDOCAINE HYDROCHLORIDE 2 ML: 10 INJECTION, SOLUTION EPIDURAL; INFILTRATION; INTRACAUDAL; PERINEURAL at 13:01

## 2019-03-25 RX ADMIN — OXYCODONE HYDROCHLORIDE 10 MG: 5 TABLET ORAL at 18:31

## 2019-03-25 RX ADMIN — ENOXAPARIN SODIUM 40 MG: 40 INJECTION SUBCUTANEOUS at 09:58

## 2019-03-25 ASSESSMENT — PAIN SCALES - GENERAL
PAINLEVEL_OUTOF10: 9
PAINLEVEL_OUTOF10: 9
PAINLEVEL_OUTOF10: 10
PAINLEVEL_OUTOF10: 9
PAINLEVEL_OUTOF10: 9
PAINLEVEL_OUTOF10: 10
PAINLEVEL_OUTOF10: 10

## 2019-03-25 ASSESSMENT — PAIN DESCRIPTION - PAIN TYPE: TYPE: SURGICAL PAIN

## 2019-03-25 ASSESSMENT — PAIN DESCRIPTION - LOCATION: LOCATION: BUTTOCKS;ABDOMEN

## 2019-03-25 NOTE — PROGRESS NOTES
Physical and Occupational Therapies: Attempted to see for follow up treatment. Pt declined stating pain and fatigue.  Esther Guadalupe PT, Juan Luis Albert OTR/L

## 2019-03-25 NOTE — PROGRESS NOTES
Spoke with pt initially about turning when I introduced myself as her new nurse around midnight. Pt stated at night she chooses a side to sleep on and doesn't like to be turned. Nurse explained that she should be shifting with the pillows to help keep skin integrity and pt refused twice. She stated to writer that \"i've got this I will be fine. \" \"Dont move me till morning. \" Nurse documented with turns that pt refused. Will continue to assess and monitor.

## 2019-03-25 NOTE — PLAN OF CARE
Nutrition Problem: Increased nutrient needs(protein)  Intervention: Food and/or Nutrient Delivery: Continue current diet  Nutritional Goals: Pt will advance to PO diet with intakes of 50% or greater and no s/s of N/V
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
## 2019-03-25 NOTE — PROGRESS NOTES
Nutrition Assessment    Type and Reason for Visit: Reassess    Nutrition Recommendations:   · Continue low fiber diet, per general surgery  · Discontinue ONS, per patient preference  · Monitor and encourage PO intake  · May need to consider alternative nutrition support if pt unable to tolerate adequate PO nutrition  · Monitor nutrition adequacy, weights, pertinent labs, BMs and clinical progress    Nutrition Assessment: Follow up: Pt remains nutritionally compromised related to malnutrition and increased nutrition needs for pressure wounds. Pt with nausea and poor appetite today. Reports she dislikes Ensure, will d/c. Encouraged bland foods as able on low fiber diet. If pt unable to tolerate adequate PO nutrition, may need to consider alternative nutrition support. Will continue to monitor nutrition status. Malnutrition Assessment:  · Malnutrition Status: Meets the criteria for moderate malnutrition  · Context: Acute illness or injury  · Findings of the 6 clinical characteristics of malnutrition (Minimum of 2 out of 6 clinical characteristics is required to make the diagnosis of moderate or severe Protein Calorie Malnutrition based on AND/ASPEN Guidelines):  1. Energy Intake-Less than or equal to 75% of estimated energy requirement, Greater than or equal to 7 days    2. Weight Loss-No significant weight loss,    3. Fat Loss-Mild subcutaneous fat loss, Orbital  4. Muscle Loss-Moderate muscle mass loss, Temples (temporalis muscle), Clavicles (pectoralis and deltoids)  5. Fluid Accumulation-Unable to assess,    6.   Strength-Not measured    Nutrition Risk Level: High(d/t wounds)    Nutrient Needs:  · Estimated Daily Total Kcal: 6552-3399(28-30 kcal/kg)  · Estimated Daily Protein (g): 61-77  · Estimated Daily Total Fluid (ml/day): 1 ml/kcal or per MD    Nutrition Diagnosis:   · Problem: Increased nutrient needs(protein)  · Etiology: related to Increased demand for energy/nutrients     Signs and symptoms:  as evidenced by Presence of wounds    Objective Information:  · Nutrition-Focused Physical Findings: Paraplegic. Multiple pressure wounds. +1 BLE edema. Distended abdomen.  Colostomy  · Wound Type: Multiple, Pressure Ulcer, Stage II, Stage III(Sacrum, R heel, Buttock)  · Current Nutrition Therapies:  · Oral Diet Orders: Low Fiber   · Oral Diet intake: Unable to assess, 1-25%(no intakes per EMR, pt reports bites of food)  · Oral Nutrition Supplement (ONS) Orders: Standard High Calorie Oral Supplement  · ONS intake: 0%, Refused  · Anthropometric Measures:  · Ht: 4' 10\" (147.3 cm)   · Current Body Wt: 113 lb (51.3 kg)  · % Weight Change:  No weight change per EMR  · Ideal Body Wt: 96 lb (43.5 kg)   · BMI Classification: BMI 18.5 - 24.9 Normal Weight    Nutrition Interventions:   Continue current diet  Continued Inpatient Monitoring    Nutrition Evaluation:   · Evaluation: No progress toward goals   · Goals: Pt will advance to PO diet with intakes of 50% or greater and no s/s of N/V    · Monitoring: Meal Intake, Supplement Intake, Nutrition Progression, Weight, Pertinent Labs, Nausea or Vomiting      Electronically signed by Lyly Hunter RD, LD on 3/25/19 at 2:48 PM    Contact Number: Office: [phone]; [address]: [id number]
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
## 2019-03-25 NOTE — CARE COORDINATION
DCP discussed pt POC with General Sx this AM. Anticipate that pt will be here until mid to end of week for repeat CT in 1-2 days. Pt does have wound vac, PICC, and unstageable pressure ulcer per Wound Care. Pt is paraplegic with multiple additional wounds as documented in [facility] note 3/25. Pt requesting referral to [facility]. Will place referral and follow up with pt.      Shannon Aguilar RN

## 2019-03-26 PROCEDURE — 99024 POSTOP FOLLOW-UP VISIT: CPT | Performed by: SURGERY

## 2019-03-26 PROCEDURE — 1200000000 HC SEMI PRIVATE

## 2019-03-26 PROCEDURE — 6360000002 HC RX W HCPCS: Performed by: SURGERY

## 2019-03-26 PROCEDURE — 2500000003 HC RX 250 WO HCPCS: Performed by: SURGERY

## 2019-03-26 PROCEDURE — 6370000000 HC RX 637 (ALT 250 FOR IP): Performed by: SURGERY

## 2019-03-26 PROCEDURE — 2580000003 HC RX 258: Performed by: SURGERY

## 2019-03-26 PROCEDURE — APPSS30 APP SPLIT SHARED TIME 16-30 MINUTES: Performed by: CLINICAL NURSE SPECIALIST

## 2019-03-26 PROCEDURE — 97530 THERAPEUTIC ACTIVITIES: CPT

## 2019-03-26 PROCEDURE — APPNB30 APP NON BILLABLE TIME 0-30 MINS: Performed by: CLINICAL NURSE SPECIALIST

## 2019-03-26 PROCEDURE — 97110 THERAPEUTIC EXERCISES: CPT

## 2019-03-26 RX ADMIN — OXYCODONE HYDROCHLORIDE 10 MG: 5 TABLET ORAL at 00:35

## 2019-03-26 RX ADMIN — POTASSIUM CHLORIDE 10 MEQ: 750 TABLET, EXTENDED RELEASE ORAL at 09:58

## 2019-03-26 RX ADMIN — COLLAGENASE SANTYL: 250 OINTMENT TOPICAL at 09:59

## 2019-03-26 RX ADMIN — OXYCODONE HYDROCHLORIDE 10 MG: 5 TABLET ORAL at 18:09

## 2019-03-26 RX ADMIN — OXYCODONE HYDROCHLORIDE 10 MG: 5 TABLET ORAL at 13:09

## 2019-03-26 RX ADMIN — Medication 10 ML: at 10:35

## 2019-03-26 RX ADMIN — MEROPENEM 1 G: 1 INJECTION, POWDER, FOR SOLUTION INTRAVENOUS at 06:11

## 2019-03-26 RX ADMIN — OXYCODONE HYDROCHLORIDE 10 MG: 5 TABLET ORAL at 22:45

## 2019-03-26 RX ADMIN — Medication 10 ML: at 09:58

## 2019-03-26 RX ADMIN — SODIUM CHLORIDE: 9 INJECTION, SOLUTION INTRAVENOUS at 09:10

## 2019-03-26 RX ADMIN — ENOXAPARIN SODIUM 40 MG: 40 INJECTION SUBCUTANEOUS at 09:58

## 2019-03-26 RX ADMIN — MEROPENEM 1 G: 1 INJECTION, POWDER, FOR SOLUTION INTRAVENOUS at 15:28

## 2019-03-26 RX ADMIN — FAMOTIDINE 20 MG: 10 INJECTION, SOLUTION INTRAVENOUS at 09:58

## 2019-03-26 RX ADMIN — FAMOTIDINE 20 MG: 10 INJECTION, SOLUTION INTRAVENOUS at 21:19

## 2019-03-26 RX ADMIN — SODIUM CHLORIDE: 9 INJECTION, SOLUTION INTRAVENOUS at 23:01

## 2019-03-26 RX ADMIN — MEROPENEM 1 G: 1 INJECTION, POWDER, FOR SOLUTION INTRAVENOUS at 23:01

## 2019-03-26 RX ADMIN — OXYCODONE HYDROCHLORIDE 10 MG: 5 TABLET ORAL at 08:15

## 2019-03-26 RX ADMIN — POTASSIUM CHLORIDE 10 MEQ: 750 TABLET, EXTENDED RELEASE ORAL at 21:19

## 2019-03-26 ASSESSMENT — PAIN DESCRIPTION - LOCATION
LOCATION: ABDOMEN;BUTTOCKS
LOCATION: ABDOMEN

## 2019-03-26 ASSESSMENT — PAIN SCALES - GENERAL
PAINLEVEL_OUTOF10: 9
PAINLEVEL_OUTOF10: 10
PAINLEVEL_OUTOF10: 10
PAINLEVEL_OUTOF10: 8
PAINLEVEL_OUTOF10: 10
PAINLEVEL_OUTOF10: 8
PAINLEVEL_OUTOF10: 9

## 2019-03-26 ASSESSMENT — PAIN DESCRIPTION - PAIN TYPE
TYPE: SURGICAL PAIN
TYPE: SURGICAL PAIN

## 2019-03-26 ASSESSMENT — PAIN DESCRIPTION - DESCRIPTORS: DESCRIPTORS: DISCOMFORT

## 2019-03-26 ASSESSMENT — PAIN DESCRIPTION - ORIENTATION
ORIENTATION: MID
ORIENTATION: MID

## 2019-03-26 ASSESSMENT — PAIN DESCRIPTION - FREQUENCY: FREQUENCY: CONTINUOUS

## 2019-03-26 ASSESSMENT — PAIN DESCRIPTION - ONSET: ONSET: ON-GOING

## 2019-03-26 ASSESSMENT — PAIN SCALES - WONG BAKER: WONGBAKER_NUMERICALRESPONSE: 10

## 2019-03-26 ASSESSMENT — PAIN DESCRIPTION - PROGRESSION: CLINICAL_PROGRESSION: NOT CHANGED

## 2019-03-26 NOTE — PLAN OF CARE
Plan of care reviewed with patient. Encouraged to use po pain medication, encouraged to work with therapy.
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
## 2019-03-26 NOTE — PROGRESS NOTES
in long sitting to eat breakfast. Reached long sitting by gradually bringing [address] upright with several rest breaks due to abdominal pain  Transfers  Comment: pt declines to get OOB  Ambulation  Ambulation?: No(non amb)   Exercises  Comments: PROM and gentle stretches 10 x each to BLEs        Assessment   Body structures, Functions, Activity limitations: Decreased functional mobility ; Decreased endurance; Increased Pain;Decreased balance  Assessment: Pt needs much encouragement to participate. PLOF indep at w/c level. Today pt agrees to LE PROM/ gentle stretches. Pt declined to sit EOB. Reached long sitting by gradually increasing HOB. Persistent report of abdominal pain. Recommend LTAC with PT to regain prior independence. Treatment Diagnosis: decreased independence with mobility  Specific instructions for Next Treatment: ther ex, bed mob, sitting tolerance  Prognosis: Good  Patient Education: importance of mobility to prevent complications of bedrest  Barriers to Learning: pt voices understanding  REQUIRES PT FOLLOW UP: Yes  Activity Tolerance: Patient limited by pain; Patient Tolerated treatment well     AM-PAC Score  AM-PAC Inpatient Mobility Raw Score : 7  AM-PAC Inpatient T-Scale Score : 26.42  Mobility Inpatient CMS 0-100% Score: 92.36  Mobility Inpatient CMS G-Code Modifier : CM     Goals  Short term goals  Time Frame for Short term goals: 1 week  Short term goal 1: Pt will transfer supine to sit with mod assist  Short term goal 2: Pt will sit EOB x 2 minutes with min assist  Short term goal 3: Pt will transfer bed to chair with mod assist with slideboard  Patient Goals   Patient goals : to go to rehab    Plan    Times per week: 3-5x/week   Times per day: Daily  Specific instructions for Next Treatment: ther ex, bed mob, sitting tolerance  Current Treatment Recommendations: Strengthening, Balance Training, Functional Mobility Training, Endurance Training, Transfer Training, Pain Management, Home Exercise Program, Positioning, Equipment Evaluation, Education, & procurement, Patient/Caregiver Education & Training  Safety Devices  Type of devices:  All fall risk precautions in place, Bed alarm in place, Call light within reach, Patient at risk for falls, Left in bed, Nurse notified    Therapy Time   Individual Concurrent Group Co-treatment   Time In 0940         Time Out [facility]         Minutes [id number] [facility]55[address]

## 2019-03-26 NOTE — PROGRESS NOTES
[facility]    PATIENT NAME: Yadira Villalta     TODAY'S DATE: 3/26/2019    CHIEF COMPLAINT: abd pain    INTERVAL HISTORY/HPI:    Pt wcontinues to report abd pain. Doesn't want to move or sit up. REVIEW OF SYSTEMS:  Pertinent positives and negatives as per interval history section    OBJECTIVE:  VITALS:  /82   Pulse 98   Temp 98.7 °F (37.1 °C) (Oral)   Resp 16   Ht 4' 10\" (1.473 m)   Wt 113 lb (51.3 kg)   LMP 02/05/2019   SpO2 97%   BMI 23.62 kg/m²     INTAKE/OUTPUT:    I/O last 3 completed shifts:  In: -   Out: 585 [Urine:500; Drains:10; Stool:75]  No intake/output data recorded. CONSTITUTIONAL:  awake and alert  LUNGS:  Respirations easy and unlabored, no crackles or wheezing  CARD:  regular rate and rhythm  ABDOMEN:  normal bowel sounds, soft, non-distended, tender,ostomy functional , wound vac in place      Data:  CBC:   Recent Labs     03/25/19  0600   WBC 12.5*   HGB 8.8*   HCT 27.5*        BMP:    Recent Labs     03/25/19  0600      K 3.6      CO2 26   BUN 4*   CREATININE <0.5*   GLUCOSE 85     Hepatic: No results for input(s): AST, ALT, ALB, BILITOT, ALKPHOS in the last 72 hours. Mag:      Recent Labs     03/25/19  0600   MG 1.80      Phos:     Recent Labs     03/25/19  0600   PHOS 2.5          ASSESSMENT AND PLAN:  33 yo with history of jose rafael's procedure for diverticulitis, perc drains  Plan for repeat CT likely tomorrow to evaluate fluid collections  Continue with IV antibiotics    Moderate protein and calorie malnutrition: oral supplements    Pressure ulcer - unstageable: continue with santyl and encourage pt to turn    Dispo: precert started for [employer] signed by Tori Shelby, APRN - [facility]    Surgery Staff    I have examined this patient and read and agree with the note by Tori Shelby CNP from today.     Infantium COTTER

## 2019-03-27 ENCOUNTER — APPOINTMENT (OUTPATIENT)
Dept: GENERAL RADIOLOGY | Age: 29
DRG: 862 | End: 2019-03-27
Payer: MEDICARE

## 2019-03-27 LAB
BILIRUBIN URINE: NEGATIVE
BLOOD, URINE: NEGATIVE
CLARITY: CLEAR
COLOR: YELLOW
GLUCOSE URINE: NEGATIVE MG/DL
KETONES, URINE: NEGATIVE MG/DL
LEUKOCYTE ESTERASE, URINE: NEGATIVE
MICROSCOPIC EXAMINATION: NORMAL
NITRITE, URINE: NEGATIVE
PH UA: 7.5 (ref 5–8)
PROTEIN UA: NEGATIVE MG/DL
SPECIFIC GRAVITY UA: 1.01 (ref 1–1.03)
URINE TYPE: NORMAL
UROBILINOGEN, URINE: 0.2 E.U./DL

## 2019-03-27 PROCEDURE — 87591 N.GONORRHOEAE DNA AMP PROB: CPT

## 2019-03-27 PROCEDURE — 87040 BLOOD CULTURE FOR BACTERIA: CPT

## 2019-03-27 PROCEDURE — 97605 NEG PRS WND THER DME<=50SQCM: CPT

## 2019-03-27 PROCEDURE — APPNB30 APP NON BILLABLE TIME 0-30 MINS: Performed by: CLINICAL NURSE SPECIALIST

## 2019-03-27 PROCEDURE — 99024 POSTOP FOLLOW-UP VISIT: CPT | Performed by: SURGERY

## 2019-03-27 PROCEDURE — 2580000003 HC RX 258: Performed by: SURGERY

## 2019-03-27 PROCEDURE — 2500000003 HC RX 250 WO HCPCS: Performed by: SURGERY

## 2019-03-27 PROCEDURE — 87491 CHLMYD TRACH DNA AMP PROBE: CPT

## 2019-03-27 PROCEDURE — 81003 URINALYSIS AUTO W/O SCOPE: CPT

## 2019-03-27 PROCEDURE — APPSS45 APP SPLIT SHARED TIME 31-45 MINUTES: Performed by: CLINICAL NURSE SPECIALIST

## 2019-03-27 PROCEDURE — 6370000000 HC RX 637 (ALT 250 FOR IP): Performed by: OBSTETRICS & GYNECOLOGY

## 2019-03-27 PROCEDURE — 6370000000 HC RX 637 (ALT 250 FOR IP): Performed by: SURGERY

## 2019-03-27 PROCEDURE — 36592 COLLECT BLOOD FROM PICC: CPT

## 2019-03-27 PROCEDURE — 6360000002 HC RX W HCPCS: Performed by: SURGERY

## 2019-03-27 PROCEDURE — 36415 COLL VENOUS BLD VENIPUNCTURE: CPT

## 2019-03-27 PROCEDURE — 1200000000 HC SEMI PRIVATE

## 2019-03-27 PROCEDURE — 71045 X-RAY EXAM CHEST 1 VIEW: CPT

## 2019-03-27 RX ORDER — FLUCONAZOLE 100 MG/1
100 TABLET ORAL DAILY
Status: COMPLETED | OUTPATIENT
Start: 2019-03-27 | End: 2019-04-05

## 2019-03-27 RX ORDER — CLOTRIMAZOLE 1 %
CREAM (GRAM) TOPICAL 2 TIMES DAILY
Status: DISCONTINUED | OUTPATIENT
Start: 2019-03-27 | End: 2019-04-04

## 2019-03-27 RX ADMIN — MORPHINE SULFATE 4 MG: 4 INJECTION INTRAVENOUS at 09:27

## 2019-03-27 RX ADMIN — FLUCONAZOLE 100 MG: 100 TABLET ORAL at 18:01

## 2019-03-27 RX ADMIN — Medication 10 ML: at 09:27

## 2019-03-27 RX ADMIN — OXYCODONE HYDROCHLORIDE 10 MG: 5 TABLET ORAL at 18:01

## 2019-03-27 RX ADMIN — ENOXAPARIN SODIUM 40 MG: 40 INJECTION SUBCUTANEOUS at 09:26

## 2019-03-27 RX ADMIN — SODIUM CHLORIDE: 9 INJECTION, SOLUTION INTRAVENOUS at 12:09

## 2019-03-27 RX ADMIN — MEROPENEM 1 G: 1 INJECTION, POWDER, FOR SOLUTION INTRAVENOUS at 23:38

## 2019-03-27 RX ADMIN — Medication 10 ML: at 20:33

## 2019-03-27 RX ADMIN — Medication 10 ML: at 23:39

## 2019-03-27 RX ADMIN — POTASSIUM CHLORIDE 10 MEQ: 750 TABLET, EXTENDED RELEASE ORAL at 20:28

## 2019-03-27 RX ADMIN — OXYCODONE HYDROCHLORIDE 10 MG: 5 TABLET ORAL at 03:50

## 2019-03-27 RX ADMIN — FAMOTIDINE 20 MG: 10 INJECTION, SOLUTION INTRAVENOUS at 09:26

## 2019-03-27 RX ADMIN — POTASSIUM CHLORIDE 10 MEQ: 750 TABLET, EXTENDED RELEASE ORAL at 09:27

## 2019-03-27 RX ADMIN — MEROPENEM 1 G: 1 INJECTION, POWDER, FOR SOLUTION INTRAVENOUS at 06:43

## 2019-03-27 RX ADMIN — OXYCODONE HYDROCHLORIDE 10 MG: 5 TABLET ORAL at 12:07

## 2019-03-27 RX ADMIN — FAMOTIDINE 20 MG: 10 INJECTION, SOLUTION INTRAVENOUS at 20:28

## 2019-03-27 RX ADMIN — Medication 10 ML: at 23:40

## 2019-03-27 RX ADMIN — OXYCODONE HYDROCHLORIDE 10 MG: 5 TABLET ORAL at 22:16

## 2019-03-27 RX ADMIN — CLOTRIMAZOLE: 10 CREAM TOPICAL at 23:38

## 2019-03-27 RX ADMIN — MEROPENEM 1 G: 1 INJECTION, POWDER, FOR SOLUTION INTRAVENOUS at 16:03

## 2019-03-27 RX ADMIN — ACETAMINOPHEN 650 MG: 325 TABLET ORAL at 20:28

## 2019-03-27 RX ADMIN — COLLAGENASE SANTYL: 250 OINTMENT TOPICAL at 09:27

## 2019-03-27 RX ADMIN — POLYETHYLENE GLYCOL 3350 17 G: 17 POWDER, FOR SOLUTION ORAL at 09:26

## 2019-03-27 ASSESSMENT — PAIN DESCRIPTION - PAIN TYPE
TYPE: SURGICAL PAIN
TYPE: SURGICAL PAIN

## 2019-03-27 ASSESSMENT — PAIN SCALES - GENERAL
PAINLEVEL_OUTOF10: 8
PAINLEVEL_OUTOF10: 8
PAINLEVEL_OUTOF10: 10
PAINLEVEL_OUTOF10: 9

## 2019-03-27 ASSESSMENT — PAIN DESCRIPTION - LOCATION
LOCATION: ABDOMEN
LOCATION: ABDOMEN
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
## 2019-03-27 NOTE — PROGRESS NOTES
Wound Depth (cm) 0 cm 3/27/2019 10:06 AM   Wound Surface Area (cm^2) 7.5 cm^2 3/27/2019 10:06 AM   Change in Wound Size % (l*w) -4900 3/27/2019 10:06 AM   Wound Volume (cm^3) 0 cm^3 3/27/2019 10:06 AM   Distance Tunneling (cm) 0 cm 3/27/2019 10:06 AM   Tunneling Position ___ O'Clock 0 3/27/2019 10:06 AM   Undermining Starts ___ O'Clock 0 3/27/2019 10:06 AM   Undermining Ends___ O'Clock 0 3/27/2019 10:06 AM   Undermining Maxium Distance (cm) 0 3/27/2019 10:06 AM   Wound Assessment Pink 3/27/2019 10:06 AM   Drainage Amount None 3/27/2019 10:06 AM   Drainage Description Clear 3/25/2019  9:00 PM   Odor None 3/27/2019 10:06 AM   Margins Attached edges; Defined edges 3/27/2019 10:06 AM   Ester-wound Assessment Clean;Dry; Intact 3/27/2019 10:06 AM   Non-staged Wound Description Partial thickness 3/27/2019 10:06 AM   [place]%Wound Bed 100 3/27/2019 10:06 AM   Red%Wound Bed 60 3/25/2019 11:35 AM   Purple%Wound Bed 0 3/25/2019 11:35 AM   Culture Taken No 3/27/2019 10:06 AM   Number of days: 20      Right ischium:           Wound 03/09/19 Coccyx Right mid coccyx extending to right  buttocks (Active)   Wound Image   3/27/2019 10:06 AM   Wound Pressure Unstageable 3/27/2019 10:06 AM   Dressing Status Changed 3/27/2019 10:06 AM   Dressing Changed Changed/New 3/27/2019 10:06 AM   Dressing/Treatment Moisture barrier; Pharmaceutical agent (see MAR); Moist to moist;Dry Dressing;ABD; Medipore 3/27/2019 10:06 AM   Wound Cleansed Rinsed/Irrigated with saline 3/27/2019 10:06 AM   Dressing Change Due 03/28/19 3/27/2019 10:06 AM   Wound Length (cm) 6 cm 3/27/2019 10:06 AM   Wound Width (cm) 7.5 cm 3/27/2019 10:06 AM   Wound Depth (cm) 0.1 cm 3/27/2019 10:06 AM   Wound Surface Area (cm^2) 45 cm^2 3/27/2019 10:06 AM   Change in Wound Size % (l*w) -40.62 3/27/2019 10:06 AM   Wound Volume (cm^3) 4.5 cm^3 3/27/2019 10:06 AM   Wound Healing % -41 3/27/2019 10:06 AM   Distance Tunneling (cm) 0 cm 3/27/2019 10:06 AM   Tunneling Position ___ O'Clock 0 Clean;Dry; Intact 3/27/2019 10:06 AM   [place]%Wound Bed 100 3/25/2019  9:50 AM   Red%Wound Bed 100 3/27/2019 10:06 AM   Culture Taken No 3/27/2019 10:06 AM   Number of days: 6      Right heel:           Wound 03/20/19 Heel Left;Posterior Stage 1 Left heel (Active)   Wound Image   3/27/2019 10:06 AM   Wound Other 3/27/2019 10:06 AM   Dressing Status Changed 3/27/2019 10:06 AM   Dressing Changed Changed/New 3/27/2019 10:06 AM   Dressing/Treatment Foam 3/27/2019 10:06 AM   Wound Cleansed Not Cleansed 3/27/2019 10:06 AM   Wound Length (cm) 2 cm 3/27/2019 10:06 AM   Wound Width (cm) 1 cm 3/27/2019 10:06 AM   Wound Depth (cm) 0 cm 3/27/2019 10:06 AM   Wound Surface Area (cm^2) 2 cm^2 3/27/2019 10:06 AM   Change in Wound Size % (l*w) 50 3/27/2019 10:06 AM   Wound Volume (cm^3) 0 cm^3 3/27/2019 10:06 AM   Distance Tunneling (cm) 0 cm 3/27/2019 10:06 AM   Tunneling Position ___ O'Clock 0 3/27/2019 10:06 AM   Undermining Starts ___ O'Clock 0 3/27/2019 10:06 AM   Undermining Ends___ O'Clock 0 3/27/2019 10:06 AM   Undermining Maxium Distance (cm) 0 3/27/2019 10:06 AM   Wound Assessment Dry; Intact; Other (Comment) 3/27/2019 10:06 AM   Drainage Amount None 3/27/2019 10:06 AM   Odor None 3/27/2019 10:06 AM   Margins Attached edges; Defined edges 3/27/2019 10:06 AM   Ester-wound Assessment Clean;Dry; Intact 3/27/2019 10:06 AM   [place]%Wound Bed 100 3/25/2019  9:50 AM   Red%Wound Bed 100 3/27/2019 10:06 AM   Culture Taken No 3/27/2019 10:06 AM   Number of days: 7      Left heel:            Wound 03/20/19 Hip Left;Lateral Left Trochanter Stage 1 (Active)   Wound Image   3/21/2019 12:15 PM   Wound Other 3/27/2019 10:06 AM   Dressing Status Clean;Dry; Intact 3/27/2019 10:06 AM   Dressing Changed Other (Comment) 3/27/2019 10:06 AM   Dressing/Treatment Foam 3/27/2019 10:06 AM   Wound Cleansed Not Cleansed 3/26/2019 10:40 PM   Dressing Change Due 03/28/19 3/27/2019 10:06 AM   Wound Length (cm) 2.5 cm 3/27/2019 10:06 AM   Wound Width (cm) 2.5 cm 3/27/2019 10:06 AM   Wound Depth (cm) 0 cm 3/27/2019 10:06 AM   Wound Surface Area (cm^2) 6.25 cm^2 3/27/2019 10:06 AM   Change in Wound Size % (l*w) 30.56 3/27/2019 10:06 AM   Wound Volume (cm^3) 0 cm^3 3/27/2019 10:06 AM   Distance Tunneling (cm) 0 cm 3/27/2019 10:06 AM   Tunneling Position ___ O'Clock 0 3/27/2019 10:06 AM   Undermining Starts ___ O'Clock 0 3/27/2019 10:06 AM   Undermining Ends___ O'Clock 0 3/27/2019 10:06 AM   Undermining Maxium Distance (cm) 0 3/27/2019 10:06 AM   Wound Assessment Red; Other (Comment) 3/27/2019 10:06 AM   Drainage Amount None 3/27/2019 10:06 AM   Odor None 3/27/2019 10:06 AM   Margins Attached edges; Defined edges 3/27/2019 10:06 AM   Ester-wound Assessment Clean;Dry; Intact 3/27/2019 10:06 AM   Non-staged Wound Description Not applicable 6/78/0669 47:36 AM   Red%Wound Bed 100 3/27/2019 10:06 AM   Culture Taken No 3/27/2019 10:06 AM   Number of days: 7     Incision 03/05/19 Abdomen Mid;Distal (Active)   Wound Image   3/27/2019 10:06 AM   Wound Assessment Red;Yellow 3/27/2019 10:06 AM   Ester-wound Assessment Clean;Dry; Intact 3/27/2019 10:06 AM   Wound Length (cm) 4.5 cm 3/27/2019 10:06 AM   Wound Width (cm) 4 cm 3/27/2019 10:06 AM   Wound Depth (cm) 1.5 cm 3/27/2019 10:06 AM   Wound Volume (cm^3) 27 cm^3 3/27/2019 10:06 AM   Wound Healing % 58 3/27/2019 10:06 AM   Closure None 3/27/2019 10:06 AM   Drainage Amount Small 3/27/2019 10:06 AM   Drainage Description Serosanguinous 3/27/2019 10:06 AM   Odor None 3/27/2019 10:06 AM   Dressing/Treatment Hydrocolloid; Vacuum dressing 3/27/2019 10:06 AM   Dressing Changed Changed/New 3/27/2019 10:06 AM   Dressing Status Changed 3/27/2019 10:06 AM   Dressing Change Due 03/29/19 3/27/2019 10:06 AM   Number of days: 21     Mid abd:          Response to treatment:  Well tolerated by patient.      Pain Assessment:  Severity:  0 / 10  Quality of pain: N/A  Wound Pain Timing/Severity: none  Premedicated: Yes  Plan:   Plan of Care: [REMOVED] Wound 03/09/19 Sacrum-Dressing/Treatment: Pharmaceutical agent (see MAR), Moist to moist, Dry Dressing, Medipore  Wound 03/06/19 Ischium Right  ischium - evolved to stage 2-Dressing/Treatment: Foam  Wound 03/09/19 Coccyx Right mid coccyx extending to right  buttocks-Dressing/Treatment: Moisture barrier, Pharmaceutical agent (see MAR), Moist to moist, Dry Dressing, ABD, Medipore  Wound 03/21/19 Heel Right-Dressing/Treatment: Foam  [REMOVED] Incision 03/01/19 Abdomen Medial;Mid-Dressing/Treatment: Open to air  Incision 03/05/19 Abdomen Mid;Distal-Dressing/Treatment: Hydrocolloid, Vacuum dressing  Wound 03/20/19 Heel Left;Posterior Stage 1 Left heel-Dressing/Treatment: Foam  Wound 03/20/19 Hip Left;Lateral Left Trochanter Stage 1-Dressing/Treatment: Foam     Dr Carmelina Uribe in during dressing change and observed buttocks wound. Continue santyl as ordered. Foul smelling purulent mucusey drainage from Vagina. Surgeon and bed side nurse made aware. Recommend: dsg changes done today  Clean all wounds with NSS.  Apply zinc barrier around mid coccyx extending to right buttocks to protect fredo wound.  Apply santyl to black eschar mid coccxy/right buttocks, cover with two 2x2 lightly moist NS soaked guaze, cover with dry 4x4 and secure with medipore tape daily. Right ischium cover with foam dressing change every 3 days.  Right flank drains dressing change weekly (due3/29/19) with biopatch and tegaderm dressing.  Left thigh foam dressing change every 3 days.  Right and left heels foam dressing change every 3 days. Dsgs changed as above.     Negative pressure wound therapy (NPWT) dressing changed today. dsg removed. Cleansed wound with NSS. Hydrocolloid and barrier film applied to open stay suture exit sited and around open dehisced incision. 3 black sponged applied into wound bed  Covered with VAC drape. Connected to 125 mmHg continuous low suction. Plan to change M-W-F.   Wound care to follow.       Colostomy bag not changed today.  2 1/4 inch 2 piece flat flange with lock and roll bag intact. Specialty Bed Required : Yes   [] Low Air Loss   [] Pressure Redistribution  [x] Fluid Immersion dolphin  [] Bariatric  [] Total Pressure Relief  [] Other:     Current Diet: DIET LOW FIBER;   Dietician consult:  Yes    Discharge Plan:  Placement for patient upon discharge: intermediate care facility - [place]  Patient appropriate for Outpatient [address]: Yes - follow up with Dr Sanam Bryan    Referrals:  [x]  active  [] [facility]  [] Supplies  [] Other    Patient/Caregiver Teaching: Updated on wound status  Level of patient/caregiver understanding able to:   [x] Indicates understanding       [] Needs reinforcement  [] Unsuccessful      [] Verbal Understanding  [] Demonstrated understanding       [] No evidence of learning  [] Refused teaching         [] N/A       Electronically signed by Jackson Neal, RN, MSN, Rafael Eisenmenger on 3/27/2019 at 11:01 AM

## 2019-03-27 NOTE — PLAN OF CARE
Pt. Has non-skid socks on, call light in reach, side rails up x-2, bed alarm on, patient instructed to use call light with concerns and questions. Pt. Alert and oriented x-4. Pt. Currently on a Dolphin mattress & is a Q-2 turn. Will continue to monitor.

## 2019-03-28 ENCOUNTER — APPOINTMENT (OUTPATIENT)
Dept: CT IMAGING | Age: 29
DRG: 862 | End: 2019-03-28
Payer: MEDICARE

## 2019-03-28 PROBLEM — L89.150 PRESSURE INJURY OF SACRAL REGION, UNSTAGEABLE (HCC): Status: ACTIVE | Noted: 2019-03-28

## 2019-03-28 LAB
ANION GAP SERPL CALCULATED.3IONS-SCNC: 4 MMOL/L (ref 3–16)
APPEARANCE FLUID: NORMAL
BASOPHILS ABSOLUTE: 0.1 K/UL (ref 0–0.2)
BASOPHILS RELATIVE PERCENT: 1.1 %
BUN BLDV-MCNC: <2 MG/DL (ref 7–20)
CALCIUM SERPL-MCNC: 7.3 MG/DL (ref 8.3–10.6)
CELL COUNT FLUID TYPE: NORMAL
CHLORIDE BLD-SCNC: 102 MMOL/L (ref 99–110)
CLOT EVALUATION: NORMAL
CO2: 31 MMOL/L (ref 21–32)
COLOR FLUID: NORMAL
CREAT SERPL-MCNC: <0.5 MG/DL (ref 0.6–1.1)
EOSINOPHILS ABSOLUTE: 0.3 K/UL (ref 0–0.6)
EOSINOPHILS RELATIVE PERCENT: 2.6 %
FLUID TYPE: NORMAL
GFR AFRICAN AMERICAN: >60
GFR NON-AFRICAN AMERICAN: >60
GLUCOSE BLD-MCNC: 89 MG/DL (ref 70–99)
GLUCOSE, FLUID: 90 MG/DL
HCT VFR BLD CALC: 23.5 % (ref 36–48)
HEMOGLOBIN: 7.7 G/DL (ref 12–16)
INR BLD: 1.39 (ref 0.86–1.14)
LD, FLUID: 201 U/L
LYMPHOCYTES ABSOLUTE: 1.9 K/UL (ref 1–5.1)
LYMPHOCYTES RELATIVE PERCENT: 19 %
LYMPHOCYTES, BODY FLUID: 70 %
MACROPHAGE FLUID: 5 %
MCH RBC QN AUTO: 31.2 PG (ref 26–34)
MCHC RBC AUTO-ENTMCNC: 32.6 G/DL (ref 31–36)
MCV RBC AUTO: 95.8 FL (ref 80–100)
MONOCYTES ABSOLUTE: 0.5 K/UL (ref 0–1.3)
MONOCYTES RELATIVE PERCENT: 5.4 %
NEUTROPHIL, FLUID: 25 %
NEUTROPHILS ABSOLUTE: 7.2 K/UL (ref 1.7–7.7)
NEUTROPHILS RELATIVE PERCENT: 71.9 %
NUCLEATED CELLS FLUID: 583 /CUMM
NUMBER OF CELLS COUNTED FLUID: 100
PDW BLD-RTO: 18.4 % (ref 12.4–15.4)
PLATELET # BLD: 357 K/UL (ref 135–450)
PMV BLD AUTO: 7.5 FL (ref 5–10.5)
POTASSIUM SERPL-SCNC: 4.8 MMOL/L (ref 3.5–5.1)
PROTEIN FLUID: 2.8 G/DL
PROTHROMBIN TIME: 15.9 SEC (ref 9.8–13)
RBC # BLD: 2.45 M/UL (ref 4–5.2)
RBC FLUID: 1000 /CUMM
SODIUM BLD-SCNC: 137 MMOL/L (ref 136–145)
WBC # BLD: 10 K/UL (ref 4–11)

## 2019-03-28 PROCEDURE — 2700000000 HC OXYGEN THERAPY PER DAY

## 2019-03-28 PROCEDURE — 82945 GLUCOSE OTHER FLUID: CPT

## 2019-03-28 PROCEDURE — 2500000003 HC RX 250 WO HCPCS: Performed by: SURGERY

## 2019-03-28 PROCEDURE — C1729 CATH, DRAINAGE: HCPCS

## 2019-03-28 PROCEDURE — 99024 POSTOP FOLLOW-UP VISIT: CPT | Performed by: SURGERY

## 2019-03-28 PROCEDURE — 89051 BODY FLUID CELL COUNT: CPT

## 2019-03-28 PROCEDURE — 6360000002 HC RX W HCPCS: Performed by: SURGERY

## 2019-03-28 PROCEDURE — 97530 THERAPEUTIC ACTIVITIES: CPT

## 2019-03-28 PROCEDURE — 71260 CT THORAX DX C+: CPT

## 2019-03-28 PROCEDURE — 2580000003 HC RX 258: Performed by: SURGERY

## 2019-03-28 PROCEDURE — 6370000000 HC RX 637 (ALT 250 FOR IP): Performed by: SURGERY

## 2019-03-28 PROCEDURE — 97110 THERAPEUTIC EXERCISES: CPT

## 2019-03-28 PROCEDURE — 85025 COMPLETE CBC W/AUTO DIFF WBC: CPT

## 2019-03-28 PROCEDURE — 80048 BASIC METABOLIC PNL TOTAL CA: CPT

## 2019-03-28 PROCEDURE — 87070 CULTURE OTHR SPECIMN AEROBIC: CPT

## 2019-03-28 PROCEDURE — 6360000002 HC RX W HCPCS: Performed by: INTERNAL MEDICINE

## 2019-03-28 PROCEDURE — 94761 N-INVAS EAR/PLS OXIMETRY MLT: CPT

## 2019-03-28 PROCEDURE — 88305 TISSUE EXAM BY PATHOLOGIST: CPT

## 2019-03-28 PROCEDURE — 88112 CYTOPATH CELL ENHANCE TECH: CPT

## 2019-03-28 PROCEDURE — 6360000002 HC RX W HCPCS: Performed by: RADIOLOGY

## 2019-03-28 PROCEDURE — 6360000004 HC RX CONTRAST MEDICATION: Performed by: SURGERY

## 2019-03-28 PROCEDURE — APPNB30 APP NON BILLABLE TIME 0-30 MINS: Performed by: CLINICAL NURSE SPECIALIST

## 2019-03-28 PROCEDURE — 99254 IP/OBS CNSLTJ NEW/EST MOD 60: CPT | Performed by: INTERNAL MEDICINE

## 2019-03-28 PROCEDURE — 6370000000 HC RX 637 (ALT 250 FOR IP): Performed by: OBSTETRICS & GYNECOLOGY

## 2019-03-28 PROCEDURE — 93970 EXTREMITY STUDY: CPT

## 2019-03-28 PROCEDURE — 87205 SMEAR GRAM STAIN: CPT

## 2019-03-28 PROCEDURE — 84157 ASSAY OF PROTEIN OTHER: CPT

## 2019-03-28 PROCEDURE — 83615 LACTATE (LD) (LDH) ENZYME: CPT

## 2019-03-28 PROCEDURE — 1200000000 HC SEMI PRIVATE

## 2019-03-28 PROCEDURE — 85610 PROTHROMBIN TIME: CPT

## 2019-03-28 RX ORDER — FENTANYL CITRATE 50 UG/ML
INJECTION, SOLUTION INTRAMUSCULAR; INTRAVENOUS
Status: COMPLETED | OUTPATIENT
Start: 2019-03-28 | End: 2019-03-28

## 2019-03-28 RX ORDER — MIDAZOLAM HYDROCHLORIDE 5 MG/ML
INJECTION INTRAMUSCULAR; INTRAVENOUS
Status: COMPLETED | OUTPATIENT
Start: 2019-03-28 | End: 2019-03-28

## 2019-03-28 RX ADMIN — ENOXAPARIN SODIUM 50 MG: 60 INJECTION SUBCUTANEOUS at 22:33

## 2019-03-28 RX ADMIN — POTASSIUM CHLORIDE 10 MEQ: 750 TABLET, EXTENDED RELEASE ORAL at 22:39

## 2019-03-28 RX ADMIN — OXYCODONE HYDROCHLORIDE 5 MG: 5 TABLET ORAL at 03:09

## 2019-03-28 RX ADMIN — SODIUM CHLORIDE: 9 INJECTION, SOLUTION INTRAVENOUS at 03:09

## 2019-03-28 RX ADMIN — MIDAZOLAM HYDROCHLORIDE 1 MG: 5 INJECTION INTRAMUSCULAR; INTRAVENOUS at 16:51

## 2019-03-28 RX ADMIN — COLLAGENASE SANTYL: 250 OINTMENT TOPICAL at 09:00

## 2019-03-28 RX ADMIN — FENTANYL CITRATE 50 MCG: 50 INJECTION INTRAMUSCULAR; INTRAVENOUS at 16:51

## 2019-03-28 RX ADMIN — POLYETHYLENE GLYCOL 3350 17 G: 17 POWDER, FOR SOLUTION ORAL at 08:23

## 2019-03-28 RX ADMIN — Medication 10 ML: at 22:37

## 2019-03-28 RX ADMIN — MORPHINE SULFATE 4 MG: 4 INJECTION INTRAVENOUS at 22:41

## 2019-03-28 RX ADMIN — OXYCODONE HYDROCHLORIDE 10 MG: 5 TABLET ORAL at 08:22

## 2019-03-28 RX ADMIN — SODIUM CHLORIDE: 9 INJECTION, SOLUTION INTRAVENOUS at 18:04

## 2019-03-28 RX ADMIN — MEROPENEM 1 G: 1 INJECTION, POWDER, FOR SOLUTION INTRAVENOUS at 06:57

## 2019-03-28 RX ADMIN — MEROPENEM 1 G: 1 INJECTION, POWDER, FOR SOLUTION INTRAVENOUS at 22:32

## 2019-03-28 RX ADMIN — FLUCONAZOLE 100 MG: 100 TABLET ORAL at 08:23

## 2019-03-28 RX ADMIN — CLOTRIMAZOLE: 10 CREAM TOPICAL at 22:34

## 2019-03-28 RX ADMIN — FAMOTIDINE 20 MG: 10 INJECTION, SOLUTION INTRAVENOUS at 22:33

## 2019-03-28 RX ADMIN — IOPAMIDOL 75 ML: 755 INJECTION, SOLUTION INTRAVENOUS at 11:44

## 2019-03-28 RX ADMIN — FAMOTIDINE 20 MG: 10 INJECTION, SOLUTION INTRAVENOUS at 08:22

## 2019-03-28 RX ADMIN — CLOTRIMAZOLE: 10 CREAM TOPICAL at 09:00

## 2019-03-28 RX ADMIN — POTASSIUM CHLORIDE 10 MEQ: 750 TABLET, EXTENDED RELEASE ORAL at 08:23

## 2019-03-28 RX ADMIN — MEROPENEM 1 G: 1 INJECTION, POWDER, FOR SOLUTION INTRAVENOUS at 18:04

## 2019-03-28 RX ADMIN — IOHEXOL 50 ML: 240 INJECTION, SOLUTION INTRATHECAL; INTRAVASCULAR; INTRAVENOUS; ORAL at 08:35

## 2019-03-28 RX ADMIN — MORPHINE SULFATE 4 MG: 4 INJECTION INTRAVENOUS at 13:50

## 2019-03-28 ASSESSMENT — PAIN SCALES - GENERAL
PAINLEVEL_OUTOF10: 9
PAINLEVEL_OUTOF10: 10
PAINLEVEL_OUTOF10: 8

## 2019-03-28 NOTE — PROGRESS NOTES
[facility] GENERAL SURGERY    Surgery Progress Note           PO    PATIENT NAME: Maximino Reynoso     TODAY'S DATE: 3/28/2019    INTERVAL HISTORY:    Pt with fevers overnight, denies sob, less abdominal pain. OBJECTIVE:   VITALS:  /68   Pulse 78   Temp 98.1 °F (36.7 °C) (Oral)   Resp 18   Ht 4' 10\" (1.473 m)   Wt 113 lb (51.3 kg)   LMP 02/05/2019   SpO2 95%   BMI 23.62 kg/m²     INTAKE/OUTPUT:    I/O last 3 completed shifts:  In: -   Out: 7978 [Urine:4325; Drains:10; Stool:200]  No intake/output data recorded. CONSTITUTIONAL:  awake and alert  LUNGS:  no crackles or wheezing  ABDOMEN:   normal bowel sounds, soft, non-distended, tender, perc drains purulent, ostomy functional, vac in place   INCISION: clean    Data:  CBC: No results for input(s): WBC, HGB, HCT, PLT in the last 72 hours. BMP:  No results for input(s): NA, K, CL, CO2, BUN, CREATININE, GLUCOSE in the last 72 hours. Hepatic: No results for input(s): AST, ALT, ALB, BILITOT, ALKPHOS in the last 72 hours. Mag:    No results for input(s): MG in the last 72 hours. Phos:   No results for input(s): PHOS in the last 72 hours. INR: No results for input(s): INR in the last 72 hours. Radiology Review:  NA    ASSESSMENT AND PLAN:  34 y.o. female status post jose rafael's procedure, perc drain of intraabdominal abscesses  1. CT today chest/abdomen/pelvis. Follow up on subphrenic abscess. May need drain manipulation with IR if not adequately draining. Will also assess right pleural effusion. Blood and urine cultures pending. 2.  Consult Pulmonology for input on right pleural effusion. 3.  Continue abx. 4.  Offloading  5.   Vac change tomorrow         Electronically signed by Pretty Walden MD

## 2019-03-28 NOTE — CARE COORDINATION
Per Hailey Nolasco at [facility], pt is accepted when medically ready for dc. If pt DC today, she would like to DCP to call Martha Nava @ [phone] who is covering. She states that she will be back in office tomorrow 3/29. Per Dr. Xochitl Linares note 3/28, pt to have repeat CT of chest/abd/pelvis and possible drain manipulation with IR if not draining adequatley. Gen Sx to also assess right pleural effusion with consult to Pulm. Wound Vac to be changed tomorrow. Dispo date unclear at this time. Will follow and assist with DC as needed.      Alexsandra Adhikari RN
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
## 2019-03-28 NOTE — PROGRESS NOTES
Dr Liliana Ji notified of fever 101.4 see orders, Tylenol given. Urine and blood cultures done, Portable  Chest xray obtained.
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
## 2019-03-28 NOTE — PLAN OF CARE
Nutrition Problem: Increased nutrient needs(protein)  Intervention: Food and/or Nutrient Delivery: (may need alternate nutrition in the next 1-2 days)  Nutritional Goals: Pt will advance to PO diet with intakes of 50% or greater and no s/s of N/V
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
## 2019-03-28 NOTE — PROGRESS NOTES
Consult has been PerfectServed to [facility] on 3/28/2019 at 1341.   Marce Mcmahan  3/28/2019

## 2019-03-29 ENCOUNTER — APPOINTMENT (OUTPATIENT)
Dept: GENERAL RADIOLOGY | Age: 29
DRG: 862 | End: 2019-03-29
Payer: MEDICARE

## 2019-03-29 LAB
C TRACH DNA GENITAL QL NAA+PROBE: NEGATIVE
FERRITIN: 447.4 NG/ML (ref 15–150)
FOLATE: 4.28 NG/ML (ref 4.78–24.2)
IRON SATURATION: 49 % (ref 15–50)
IRON: 33 UG/DL (ref 37–145)
N. GONORRHOEAE DNA: NEGATIVE
TOTAL IRON BINDING CAPACITY: 68 UG/DL (ref 260–445)
VITAMIN B-12: 652 PG/ML (ref 211–911)

## 2019-03-29 PROCEDURE — 81240 F2 GENE: CPT

## 2019-03-29 PROCEDURE — 83550 IRON BINDING TEST: CPT

## 2019-03-29 PROCEDURE — 6360000002 HC RX W HCPCS: Performed by: SURGERY

## 2019-03-29 PROCEDURE — 99024 POSTOP FOLLOW-UP VISIT: CPT | Performed by: SURGERY

## 2019-03-29 PROCEDURE — APPNB30 APP NON BILLABLE TIME 0-30 MINS: Performed by: CLINICAL NURSE SPECIALIST

## 2019-03-29 PROCEDURE — 82607 VITAMIN B-12: CPT

## 2019-03-29 PROCEDURE — 6360000002 HC RX W HCPCS: Performed by: INTERNAL MEDICINE

## 2019-03-29 PROCEDURE — 82728 ASSAY OF FERRITIN: CPT

## 2019-03-29 PROCEDURE — 6370000000 HC RX 637 (ALT 250 FOR IP): Performed by: OBSTETRICS & GYNECOLOGY

## 2019-03-29 PROCEDURE — 2500000003 HC RX 250 WO HCPCS: Performed by: SURGERY

## 2019-03-29 PROCEDURE — 82746 ASSAY OF FOLIC ACID SERUM: CPT

## 2019-03-29 PROCEDURE — 71045 X-RAY EXAM CHEST 1 VIEW: CPT

## 2019-03-29 PROCEDURE — 2580000003 HC RX 258: Performed by: SURGERY

## 2019-03-29 PROCEDURE — 86148 ANTI-PHOSPHOLIPID ANTIBODY: CPT

## 2019-03-29 PROCEDURE — 81241 F5 GENE: CPT

## 2019-03-29 PROCEDURE — 97605 NEG PRS WND THER DME<=50SQCM: CPT

## 2019-03-29 PROCEDURE — 1200000000 HC SEMI PRIVATE

## 2019-03-29 PROCEDURE — 99232 SBSQ HOSP IP/OBS MODERATE 35: CPT | Performed by: INTERNAL MEDICINE

## 2019-03-29 PROCEDURE — 6370000000 HC RX 637 (ALT 250 FOR IP): Performed by: SURGERY

## 2019-03-29 PROCEDURE — 83540 ASSAY OF IRON: CPT

## 2019-03-29 RX ADMIN — FLUCONAZOLE 100 MG: 100 TABLET ORAL at 10:09

## 2019-03-29 RX ADMIN — MORPHINE SULFATE 4 MG: 4 INJECTION INTRAVENOUS at 23:09

## 2019-03-29 RX ADMIN — FAMOTIDINE 20 MG: 10 INJECTION, SOLUTION INTRAVENOUS at 21:05

## 2019-03-29 RX ADMIN — COLLAGENASE SANTYL: 250 OINTMENT TOPICAL at 18:00

## 2019-03-29 RX ADMIN — POTASSIUM CHLORIDE 10 MEQ: 750 TABLET, EXTENDED RELEASE ORAL at 21:05

## 2019-03-29 RX ADMIN — CLOTRIMAZOLE: 10 CREAM TOPICAL at 10:08

## 2019-03-29 RX ADMIN — MORPHINE SULFATE 4 MG: 4 INJECTION INTRAVENOUS at 08:54

## 2019-03-29 RX ADMIN — MORPHINE SULFATE 4 MG: 4 INJECTION INTRAVENOUS at 12:40

## 2019-03-29 RX ADMIN — MEROPENEM 1 G: 1 INJECTION, POWDER, FOR SOLUTION INTRAVENOUS at 06:55

## 2019-03-29 RX ADMIN — MEROPENEM 1 G: 1 INJECTION, POWDER, FOR SOLUTION INTRAVENOUS at 23:36

## 2019-03-29 RX ADMIN — MORPHINE SULFATE 4 MG: 4 INJECTION INTRAVENOUS at 05:47

## 2019-03-29 RX ADMIN — MORPHINE SULFATE 4 MG: 4 INJECTION INTRAVENOUS at 16:17

## 2019-03-29 RX ADMIN — OXYCODONE HYDROCHLORIDE 10 MG: 5 TABLET ORAL at 18:10

## 2019-03-29 RX ADMIN — Medication 10 ML: at 10:10

## 2019-03-29 RX ADMIN — Medication 10 ML: at 21:06

## 2019-03-29 RX ADMIN — ENOXAPARIN SODIUM 50 MG: 60 INJECTION SUBCUTANEOUS at 08:54

## 2019-03-29 RX ADMIN — MEROPENEM 1 G: 1 INJECTION, POWDER, FOR SOLUTION INTRAVENOUS at 15:11

## 2019-03-29 RX ADMIN — ENOXAPARIN SODIUM 50 MG: 60 INJECTION SUBCUTANEOUS at 21:05

## 2019-03-29 RX ADMIN — FAMOTIDINE 20 MG: 10 INJECTION, SOLUTION INTRAVENOUS at 08:55

## 2019-03-29 RX ADMIN — POLYETHYLENE GLYCOL 3350 17 G: 17 POWDER, FOR SOLUTION ORAL at 08:55

## 2019-03-29 RX ADMIN — MORPHINE SULFATE 4 MG: 4 INJECTION INTRAVENOUS at 03:02

## 2019-03-29 RX ADMIN — POTASSIUM CHLORIDE 10 MEQ: 750 TABLET, EXTENDED RELEASE ORAL at 08:55

## 2019-03-29 ASSESSMENT — PAIN SCALES - GENERAL
PAINLEVEL_OUTOF10: 10
PAINLEVEL_OUTOF10: 8
PAINLEVEL_OUTOF10: 10
PAINLEVEL_OUTOF10: 9
PAINLEVEL_OUTOF10: 10
PAINLEVEL_OUTOF10: 9
PAINLEVEL_OUTOF10: 10

## 2019-03-29 NOTE — PROGRESS NOTES
Routine consult sent by [facility] service to Dr. Joann Schwartz for acute DVT in atypical location.

## 2019-03-29 NOTE — PROGRESS NOTES
Pt transferred to C3 in stable condition. VSS. Oriented to nurse, room, call light. Bedside report obtained from C5 RN. Belongings transferred. Skin assessment with C5 RN. Wounds as documented in flow sheets. Wound vac in place, chest tube to water seal. Orders obtained from Pul. Pt assisted with dinner order. Will continue to monitor. Call light in reach.

## 2019-03-29 NOTE — PLAN OF CARE
Patient told RN in beginning of shift that she did not want to be woken up to be turned and that she would call out if she wanted to be repositioned. When turning patient, we are using a wedge. Patient also has boots on to help decrease skin irritation on heels. Call light within reach, will continue to monitor.
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
## 2019-03-29 NOTE — CARE COORDINATION
Writer has reviewed chart and is aware the pt will be going to [facility] disks at [facility]. Pt was active with [facility] prior to admission. Writer will notify [facility] upon DC to f/u with pt during stay at [facility] and coordinate for home care services, should they be needed upon [address] from [facility]. Pt's services with [facility] will remain on hold until she DC's home.    Inés Ellington RN CTN with [facility]  YBQ:[phone]

## 2019-03-29 NOTE — PROGRESS NOTES
rate and rhythm with normal S1/S2 without murmurs, rubs or gallops. Abdomen: Patient with known intra-abdominal postoperative conditions. Generalized tenderness. Musculoskeletal: No clubbing, cyanosis or edema bilaterally. Chronic deformities caused by paraplegia, no acute changes. Skin: Skin color, texture, turgor normal.  No rashes or lesions. Neurologic:  Patient is paraplegic. Lower extremity motor and sensory function is impaired on a chronic basis. No new focal changes. Psychiatric: Alert and oriented, thought content appropriate, normal insight  Capillary Refill: Brisk,< 3 seconds   Peripheral Pulses: +2 palpable, equal bilaterally     y       Labs:   Recent Labs     03/28/19  0845   WBC 10.0   HGB 7.7*   HCT 23.5*        Recent Labs     03/28/19  0845      K 4.8      CO2 31   BUN <2*   CREATININE <0.5*   CALCIUM 7.3*     No results for input(s): AST, ALT, BILIDIR, BILITOT, ALKPHOS in the last 72 hours. Recent Labs     03/28/19  0845   INR 1.39*     No results for input(s): Kavitha Mari in the last 72 hours. Urinalysis:      Lab Results   Component Value Date    NITRU Negative 03/27/2019    WBCUA 3-5 03/20/2019    BACTERIA Rare 03/15/2019    RBCUA None seen 03/20/2019    BLOODU Negative 03/27/2019    SPECGRAV 1.010 03/27/2019    GLUCOSEU Negative 03/27/2019       Radiology:  XR CHEST PORTABLE   Final Result   Small to moderate right-sided pneumothorax with decreased pleural fluid. CT GUIDED PLEURAL DRAINAGE W CATH PERC   Final Result   Successful CT guided placement of a right chest tube         VL Extremity Venous Bilateral   Final Result      CT CHEST ABDOMEN PELVIS W CONTRAST   Final Result   Addendum 1 of 1   ADDENDUM:   Impression section should include:      Filling defect in the right internal iliac vein on series 3, image 111 is   again noted. Findings highly suspicious for thrombus.       The findings were sent to the Radiology Results Communication Center at    12:18   pm on 3/28/2019to be communicated to a licensed caregiver. Final      XR CHEST PORTABLE   Final Result   Interval development of moderate partially loculated right pleural effusion. A the aerated right lung shows opacification compatible with atelectasis   and/or pneumonia. IR PICC WO SQ PORT/PUMP > 5 YEARS   Final Result   Successful placement of PICC line. CT ABDOMEN PELVIS W IV CONTRAST Additional Contrast? None   Final Result   Moderate residual abscess along the anterior hepatic margin, anterior to the   right lateral pigtail drainage catheter. Small amount of loculated fluid along the right pericolic gutter, extending   inferiorly to the pelvis, compatible with additional component of abscess. Moderate right pleural effusion and trace left pleural effusion. Bibasilar   atelectasis. Anasarca. Nonocclusive thrombus versus mixing artifact within right internal iliac vein. XR CHEST PORTABLE    (Results Pending)           Assessment/Plan:    Active Hospital Problems    Diagnosis Date Noted    Pneumothorax [J93.9]     Pressure injury of sacral region, unstageable ([address] [L89.150] 03/28/2019    Loculated pleural effusion [J90]     Abnormal CT scan, chest [R93.89]     Acute deep vein thrombosis (DVT) of non-extremity vein [I82.90]     Anemia [D64.9]     Rupture of operation wound [T81.31XA]     Moderate malnutrition ([address] [E44.0] 03/21/2019    Generalized abdominal pain [R10.84]     Post-operative nausea and vomiting [R11.2, Z98.890] 03/20/2019     PLAN:    Iliac vein thrombosis  Patient is paraplegic.   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     Pleural

## 2019-03-29 NOTE — CARE COORDINATION
Georgia Vang with [facility] called to inquire on Pt's tentative disposition date. Perfect serve sent to Ying Mckeon with the surgery department. Awaiting response at this time. This RN CM spoke to Ying Mckeon [facility]  who states that the Pt will be here through the weekend and will likely be ready for d/c sometime next week. Georgia Vang with [facility] contacted and updated.

## 2019-03-29 NOTE — PROGRESS NOTES
[facility] GENERAL SURGERY    Surgery Progress Note           PO    PATIENT NAME: Marlen Nicolas     TODAY'S DATE: 3/29/2019    INTERVAL HISTORY:    Pt with some pain at chest tube site, abd pain stable. OBJECTIVE:   VITALS:  /74   Pulse 85   Temp 98.5 °F (36.9 °C) (Oral)   Resp 16   Ht 4' 10\" (1.473 m)   Wt 113 lb (51.3 kg)   SpO2 96%   BMI 23.62 kg/m²     INTAKE/OUTPUT:    I/O last 3 completed shifts: In: 1331 [P.O.:960; I.V.:483]  Out: 3510 [Urine:2400; Stool:300; Chest Tube:810]  No intake/output data recorded. CONSTITUTIONAL:  awake and alert  LUNGS: chest tube serous  ABDOMEN:   normal bowel sounds, soft, non-distended, tender, ostomy functional, perc drains purulent,    INCISION: vac in place    Data:  CBC:   Recent Labs     03/28/19  0845   WBC 10.0   HGB 7.7*   HCT 23.5*        BMP:    Recent Labs     03/28/19  0845      K 4.8      CO2 31   BUN <2*   CREATININE <0.5*   GLUCOSE 89     Hepatic: No results for input(s): AST, ALT, ALB, BILITOT, ALKPHOS in the last 72 hours. Mag:    No results for input(s): MG in the last 72 hours. Phos:   No results for input(s): PHOS in the last 72 hours. INR:   Recent Labs     03/28/19  0845   INR 1.39*         Radiology Review:  NA    ASSESSMENT AND PLAN:  34 y.o. female status post jose rafael's procedure, perc drain intraabdominal abscesses, chest tube for perfusion  1. Regular diet  2. Continue abx. Likely remove lower perc drain prior to discharge. 3.  Monitor chest tube output.   4.  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

## 2019-03-29 NOTE — PROGRESS NOTES
[facility] Wound Ostomy Continence Nurse  Follow-up Progress Note       NAME:  Rosa Baron  MEDICAL RECORD NUMBER:  [id number]  AGE:  34 y.o. GENDER:  female  :  1990  TODAY'S DATE:  3/29/2019    Subjective:  I had fluid in my lungs and the MD put in a chest tube yesterday and then they moved me to this room while I was asleep. Wound Identification:  Wound Type: Dehisced distal mid abdominal incision line. Small wounds on abdomin covered in yellow slough (were scabs) from stay suture sites on - these areas are mostly pink now and healing well.  Mid coccyx extending to right buttocks unstageable wound (not witnessed today).  Right ischium DTI stage 2 pressure injury (Not witnessed today).  Left thigh blanchable pressure injury (not witnessed today).  Right and left heels stage 1 pressure injury (Did not witness these wounds today). Right lateral chest tube now. Hx of existing colostomy. Hx of perforated distal sigmoid colon.  To OR on 3/2/19 for Block's procedure by Dr Flaco Carranza.     Stoma size: 1 1/4 inch = 32 mm. Closed separation at 900 am-100 am positition  Flange size: Currently 1 1/4 inch 2 piece flat # [id number] with lock and roll bag # [id number]       Patient Goal of Care:  [x] Wound Healing  [] Odor Control   [] Palliative Care  [] Pain Control   [] Other:     Objective:  Chest tube. Lying in bed. [place]. /69   Pulse 93   Temp 99.788 °F (37.7 °C) (Oral)   Resp 16   Ht 4' 10\" (1.473 m)   Wt 113 lb (51.3 kg)   SpO2 97%   BMI 23.62 kg/m²   Tom Risk Score: Tom Scale Score: 11  Assessment:  Mid line wound filling in. No undermining present now. Ester wound intact. Colostomy smaller today.    Measurements:  Negative Pressure Wound Therapy Abdomen Mid (Active)   $ Standard NPWT <=50 sq cm PER TX $ Yes 3/29/2019  5:00 PM   Wound Type Surgical 3/29/2019  5:00 PM   Unit Type KCI VAC 3/29/2019  5:00 PM   Dressing Type Black foam 3/29/2019  5:00 PM Number of pieces used 1 3/29/2019  5:00 PM   Cycle Continuous 3/29/2019  5:00 PM   Target Pressure (mmHg) 125 3/29/2019  5:00 PM   Intensity 1 3/29/2019  5:00 PM   Canister changed? No 3/29/2019  5:00 PM   Dressing Status Changed 3/29/2019  5:00 PM   Dressing Changed Changed/New 3/29/2019  5:00 PM   Drainage Amount Scant 3/29/2019  5:00 PM   Drainage Description Serosanguinous 3/29/2019  5:00 PM   Dressing Change Due 04/01/19 3/29/2019  5:00 PM   Wound Assessment Red 3/29/2019  5:00 PM   Ester-wound Assessment Clean;Dry; Intact 3/29/2019  5:00 PM   Shape oval 3/29/2019  5:00 PM   Odor None 3/29/2019  5:00 PM   Number of days: 4       Wound 03/06/19 Ischium Right  ischium - evolved to stage 2 (Active)   Wound Image   3/27/2019 10:06 AM   Wound Pressure Stage  2 3/28/2019 10:45 PM   Dressing Status Clean;Dry; Intact 3/29/2019  8:00 AM   Dressing Changed Changed/New 3/29/2019  8:00 AM   Dressing/Treatment Foam 3/29/2019  8:00 AM   Wound Cleansed Rinsed/Irrigated with saline 3/28/2019 10:45 PM   Dressing Change Due 03/30/19 3/27/2019  8:00 PM   Wound Length (cm) 2.5 cm 3/27/2019 10:06 AM   Wound Width (cm) 3 cm 3/27/2019 10:06 AM   Wound Depth (cm) 0 cm 3/27/2019 10:06 AM   Wound Surface Area (cm^2) 7.5 cm^2 3/27/2019 10:06 AM   Change in Wound Size % (l*w) -4900 3/27/2019 10:06 AM   Wound Volume (cm^3) 0 cm^3 3/27/2019 10:06 AM   Distance Tunneling (cm) 0 cm 3/27/2019 10:06 AM   Tunneling Position ___ O'Clock 0 3/27/2019 10:06 AM   Undermining Starts ___ O'Clock 0 3/27/2019 10:06 AM   Undermining Ends___ O'Clock 0 3/27/2019 10:06 AM   Undermining Maxium Distance (cm) 0 3/27/2019 10:06 AM   Wound Assessment Pink 3/29/2019  8:00 AM   Drainage Amount None 3/29/2019  8:00 AM   Drainage Description Clear 3/29/2019  8:00 AM   Odor None 3/29/2019  8:00 AM   Margins Attached edges; Defined edges 3/27/2019 10:06 AM   Ester-wound Assessment Clean;Dry; Intact 3/29/2019  8:00 AM   Non-staged Wound Description Partial thickness 3/29/2019  8:00 AM   [place]%Wound Bed 100 3/27/2019 10:06 AM   Red%Wound Bed 60 3/25/2019 11:35 AM   Purple%Wound Bed 0 3/25/2019 11:35 AM   Culture Taken No 3/27/2019 10:06 AM   Number of days: 22       Wound 03/09/19 Coccyx Right mid coccyx extending to right  buttocks (Active)   Wound Image   3/27/2019 10:06 AM   Wound Pressure Unstageable 3/28/2019 10:45 PM   Dressing Status Clean;Dry; Intact 3/29/2019  8:00 AM   Dressing Changed Changed/New 3/27/2019 10:06 AM   Dressing/Treatment Moisture barrier; Pharmaceutical agent (see MAR); Moist to moist;Dry Dressing;ABD; Medipore 3/27/2019  8:00 PM   Wound Cleansed Rinsed/Irrigated with saline 3/27/2019 10:06 AM   Dressing Change Due 03/28/19 3/27/2019  8:00 PM   Wound Length (cm) 6 cm 3/27/2019 10:06 AM   Wound Width (cm) 7.5 cm 3/27/2019 10:06 AM   Wound Depth (cm) 0.1 cm 3/27/2019 10:06 AM   Wound Surface Area (cm^2) 45 cm^2 3/27/2019 10:06 AM   Change in Wound Size % (l*w) -40.62 3/27/2019 10:06 AM   Wound Volume (cm^3) 4.5 cm^3 3/27/2019 10:06 AM   Wound Healing % -41 3/27/2019 10:06 AM   Distance Tunneling (cm) 0 cm 3/27/2019 10:06 AM   Tunneling Position ___ O'Clock 0 3/27/2019 10:06 AM   Undermining Starts ___ O'Clock 0 3/27/2019 10:06 AM   Undermining Ends___ O'Clock 0 3/27/2019 10:06 AM   Undermining Maxium Distance (cm) 0 3/27/2019 10:06 AM   Wound Assessment Black;Red;Yellow 3/29/2019  8:00 AM   Drainage Amount Moderate 3/29/2019  8:00 AM   Drainage Description Purulent 3/29/2019  8:00 AM   Odor Mild 3/29/2019  8:00 AM   Margins Unattached edges; Defined edges 3/27/2019 10:06 AM   Ester-wound Assessment Blanchable erythema 3/29/2019  8:00 AM   Non-staged Wound Description Partial thickness 3/29/2019  8:00 AM   Red%Wound Bed 15 3/27/2019 10:06 AM   Yellow%Wound Bed 5 3/27/2019 10:06 AM   Black%Wound Bed 80 3/27/2019 10:06 AM   Purple%Wound Bed 60 3/25/2019  9:50 AM   Culture Taken No 3/27/2019 10:06 AM   Number of days: 19       Wound 03/21/19 Heel Right (Active) Wound Image   3/27/2019 10:06 AM   Wound Pressure Stage  1 3/26/2019 10:40 PM   Dressing Status Dry; Intact; Clean 3/29/2019  8:00 AM   Dressing Changed Changed/New 3/27/2019 10:06 AM   Dressing/Treatment Foam 3/29/2019  8:00 AM   Wound Cleansed Not Cleansed 3/28/2019 10:45 PM   Dressing Change Due 03/30/19 3/27/2019  8:00 PM   Wound Length (cm) 1 cm 3/27/2019 10:06 AM   Wound Width (cm) 1 cm 3/27/2019 10:06 AM   Wound Depth (cm) 0 cm 3/27/2019 10:06 AM   Wound Surface Area (cm^2) 1 cm^2 3/27/2019 10:06 AM   Change in Wound Size % (l*w) 50 3/27/2019 10:06 AM   Wound Volume (cm^3) 0 cm^3 3/27/2019 10:06 AM   Distance Tunneling (cm) 0 cm 3/27/2019 10:06 AM   Tunneling Position ___ O'Clock 0 3/27/2019 10:06 AM   Undermining Starts ___ O'Clock 0 3/27/2019 10:06 AM   Undermining Ends___ O'Clock 0 3/27/2019 10:06 AM   Undermining Maxium Distance (cm) 0 3/27/2019 10:06 AM   Wound Assessment Red 3/29/2019  8:00 AM   Drainage Amount None 3/29/2019  8:00 AM   Odor None 3/29/2019  8:00 AM   Margins Attached edges; Defined edges 3/29/2019  8:00 AM   Ester-wound Assessment Clean;Dry; Intact 3/29/2019  8:00 AM   [place]%Wound Bed 100 3/25/2019  9:50 AM   Red%Wound Bed 100 3/27/2019 10:06 AM   Culture Taken No 3/27/2019 10:06 AM   Number of days: 8       Wound 03/20/19 Heel Left;Posterior Stage 1 Left heel (Active)   Wound Image   3/27/2019 10:06 AM   Wound Pressure Stage  1 3/28/2019 10:45 PM   Dressing Status Clean;Dry; Intact 3/29/2019  8:00 AM   Dressing Changed Changed/New 3/27/2019 10:06 AM   Dressing/Treatment Foam 3/29/2019  8:00 AM   Wound Cleansed Not Cleansed 3/28/2019 10:45 PM   Dressing Change Due 03/30/19 3/27/2019  8:00 PM   Wound Length (cm) 2 cm 3/27/2019 10:06 AM   Wound Width (cm) 1 cm 3/27/2019 10:06 AM   Wound Depth (cm) 0 cm 3/27/2019 10:06 AM   Wound Surface Area (cm^2) 2 cm^2 3/27/2019 10:06 AM   Change in Wound Size % (l*w) 50 3/27/2019 10:06 AM   Wound Volume (cm^3) 0 cm^3 3/27/2019 10:06 AM   Distance Tunneling (cm) 0 cm 3/27/2019 10:06 AM   Tunneling Position ___ O'Clock 0 3/27/2019 10:06 AM   Undermining Starts ___ O'Clock 0 3/27/2019 10:06 AM   Undermining Ends___ O'Clock 0 3/27/2019 10:06 AM   Undermining Maxium Distance (cm) 0 3/27/2019 10:06 AM   Wound Assessment Dry; Intact; Clean 3/29/2019  8:00 AM   Drainage Amount None 3/29/2019  8:00 AM   Odor None 3/29/2019  8:00 AM   Margins Attached edges; Defined edges 3/27/2019 10:06 AM   Ester-wound Assessment Clean;Dry; Intact 3/29/2019  8:00 AM   [place]%Wound Bed 100 3/25/2019  9:50 AM   Red%Wound Bed 100 3/27/2019 10:06 AM   Culture Taken No 3/27/2019 10:06 AM   Number of days: 9       Wound 03/20/19 Hip Left;Lateral Left Trochanter Stage 1 (Active)   Wound Image   3/21/2019 12:15 PM   Wound Other 3/28/2019 10:45 PM   Dressing Status Clean;Dry; Intact 3/29/2019  8:00 AM   Dressing Changed Other (Comment) 3/27/2019 10:06 AM   Dressing/Treatment Foam 3/29/2019  8:00 AM   Wound Cleansed Not Cleansed 3/26/2019 10:40 PM   Dressing Change Due 03/30/19 3/27/2019  8:00 PM   Wound Length (cm) 2.5 cm 3/27/2019 10:06 AM   Wound Width (cm) 2.5 cm 3/27/2019 10:06 AM   Wound Depth (cm) 0 cm 3/27/2019 10:06 AM   Wound Surface Area (cm^2) 6.25 cm^2 3/27/2019 10:06 AM   Change in Wound Size % (l*w) 30.56 3/27/2019 10:06 AM   Wound Volume (cm^3) 0 cm^3 3/27/2019 10:06 AM   Distance Tunneling (cm) 0 cm 3/27/2019 10:06 AM   Tunneling Position ___ O'Clock 0 3/27/2019 10:06 AM   Undermining Starts ___ O'Clock 0 3/27/2019 10:06 AM   Undermining Ends___ O'Clock 0 3/27/2019 10:06 AM   Undermining Maxium Distance (cm) 0 3/27/2019 10:06 AM   Wound Assessment Red 3/29/2019  8:00 AM   Drainage Amount None 3/29/2019  8:00 AM   Odor None 3/29/2019  8:00 AM   Margins Attached edges; Defined edges 3/27/2019 10:06 AM   Ester-wound Assessment Clean;Dry; Intact 3/29/2019  8:00 AM   Non-staged Wound Description Not applicable 0/44/8463 92:23 AM   Red%Wound Bed 100 3/27/2019 10:06 AM   Culture Taken No 3/27/2019 10:06 AM   Number of days: 9     Incision 03/05/19 Abdomen Mid;Distal (Active)   Wound Image   3/27/2019 10:06 AM   Wound Assessment Red 3/29/2019  5:00 PM   Ester-wound Assessment Clean;Dry; Intact 3/29/2019  5:00 PM   Wound Length (cm) 4.5 cm 3/27/2019 10:06 AM   Wound Width (cm) 4 cm 3/27/2019 10:06 AM   Wound Depth (cm) 1.5 cm 3/27/2019 10:06 AM   Wound Volume (cm^3) 27 cm^3 3/27/2019 10:06 AM   Wound Healing % 58 3/27/2019 10:06 AM   Closure None 3/29/2019  5:00 PM   Drainage Amount Small 3/29/2019  5:00 PM   Drainage Description Serosanguinous 3/29/2019  5:00 PM   Odor None 3/29/2019  5:00 PM   Dressing/Treatment Barrier Film;Hydrocolloid; Vacuum dressing 3/29/2019  5:00 PM   Dressing Changed Changed/New 3/29/2019  5:00 PM   Dressing Status Changed 3/29/2019  5:00 PM   Dressing Change Due 04/01/19 3/29/2019  5:00 PM   Number of days: 24     Colostomy      Colostomy LUQ Descending/sigmoid (Active)   Stomal Appliance 2 piece 3/29/2019  5:00 PM   Flange Size (inches) 2.25 Inches 3/25/2019 11:35 AM   Stoma  Assessment Moist;Pink 3/29/2019  5:00 PM   Mucocutaneous Junction Separation 3/29/2019  5:00 PM   Peristomal Assessment Clean; Intact 3/29/2019  5:00 PM   Treatment Bag change;Site care; Heat applied 3/29/2019  5:00 PM   Stool Appearance Formed 3/29/2019  5:00 PM   Stool Color Brown 3/29/2019  5:00 PM   Stool Amount Small 3/29/2019  5:00 PM   Output (mL) 50 ml 3/29/2019 12:35 PM   Number of days: 28       Intake/Output Summary (Last 24 hours) at 3/29/2019 1747  Last data filed at 3/29/2019 1235  Gross per 24 hour   Intake 1443 ml   Output 2360 ml   Net -917 ml     Response to treatment:  Well tolerated by patient.      Pain Assessment:  Severity:  0 / 10  Quality of pain: N/A  Wound Pain Timing/Severity: none  Premedicated: No  Plan:   Plan of Care: [REMOVED] Wound 03/09/19 Sacrum-Dressing/Treatment: Pharmaceutical agent (see MAR), Moist to moist, Dry Dressing, Medipore  Wound 03/06/19 Ischium Right  ischium - evolved to stage 2-Dressing/Treatment: Foam  Wound 03/09/19 Coccyx Right mid coccyx extending to right  buttocks-Dressing/Treatment: Moisture barrier, Pharmaceutical agent (see MAR), Moist to moist, Dry Dressing, ABD, Medipore  Wound 03/21/19 Heel Right-Dressing/Treatment: Foam  [REMOVED] Incision 03/01/19 Abdomen Medial;Mid-Dressing/Treatment: Open to air  Incision 03/05/19 Abdomen Mid;Distal-Dressing/Treatment: Barrier Film, Hydrocolloid, Vacuum dressing  Wound 03/20/19 Heel Left;Posterior Stage 1 Left heel-Dressing/Treatment: Foam  Wound 03/20/19 Hip Left;Lateral Left Trochanter Stage 1-Dressing/Treatment: Foam       Negative pressure wound therapy (NPWT) dressing changed today. dsg removed. Cleansed wound with NSS. Hydrocolloid and barrier film applied to open stay suture exit sited and around open dehisced incision. 1 black sponged applied into wound bed  Covered with VAC drape. Connected to 125 mmHg continuous low suction. Plan to change M-W-F. Wound care to follow.       Colostomy bag changed today. Ester stoma skin cleansed with warm water. 2 1/4 inch 2 piece flat flange with lock and roll bag applied. Specialty Bed Required : Yes   [] Low Air Loss   [] Pressure Redistribution  [x] Fluid Immersion dolphin  [] Bariatric  [] Total Pressure Relief  [] Other:     Current Diet: DIET LOW FIBER;   Dietician consult:  Yes    Discharge Plan:  Placement for patient upon discharge: skilled nursing   Patient appropriate for Outpatient [address]: Yes    Referrals:  []  following  [] [facility]  [] Supplies  [] Other    Patient/Caregiver Teaching: Updated on wound inprovement  Level of patient/caregiver understanding able to:   [x] Indicates understanding       [] Needs reinforcement  [] Unsuccessful      [] Verbal Understanding  [] Demonstrated understanding       [] No evidence of learning  [] Refused teaching         [] N/A       Electronically signed by Geo Araya, RN, MSN, Gianfranco Vargas on 3/29/2019 at 5:38 PM

## 2019-03-29 NOTE — CONSULTS
Facility-Administered Medications   Medication Dose Route Frequency Provider Last Rate Last Dose    enoxaparin (LOVENOX) injection 50 mg  1 mg/kg Subcutaneous BID Ilene Brody MD   50 mg at 03/29/19 0854    clotrimazole (LOTRIMIN) 1 % cream   Topical BID Rama Gilliland MD        fluconazole (DIFLUCAN) tablet 100 mg  100 mg Oral Daily Rama Gilliland MD   100 mg at 03/29/19 1009    meropenem (MERREM) 1 g in sodium chloride 0.9 % 100 mL IVPB (mini-bag)  1 g Intravenous Q8H Haroldo Olmstead MD   Stopped at 03/29/19 0725    polyethylene glycol (GLYCOLAX) packet 17 g  17 g Oral Daily Elan Jacobson MD   17 g at 03/29/19 0855    sodium chloride flush 0.9 % injection 10 mL  10 mL Intravenous 2 times per day Elan Jacobson MD   10 mL at 03/29/19 1010    sodium chloride flush 0.9 % injection 10 mL  10 mL Intravenous PRN Elan Jacobson MD        potassium chloride Brendalyn Gonzalo M) extended release tablet 10 mEq  10 mEq Oral BID Haroldo Olmstead MD   10 mEq at 03/29/19 0855    oxyCODONE (ROXICODONE) immediate release tablet 5 mg  5 mg Oral Q4H PRN Haroldo Olmstead MD   5 mg at 03/28/19 0309    Or    oxyCODONE (ROXICODONE) immediate release tablet 10 mg  10 mg Oral Q4H PRN Haroldo Olmstead MD   10 mg at 03/28/19 1796    collagenase ointment   Topical Daily Almita Alvarado APRN - CNP        acetaminophen (TYLENOL) tablet 650 mg  650 mg Oral Q4H PRN Yuliya Aparicio MD   650 mg at 03/27/19 2028    ondansetron (ZOFRAN) injection 4 mg  4 mg Intravenous Q6H PRN Yuliya Aparicio MD   4 mg at 03/23/19 1602    0.9 % sodium chloride infusion   Intravenous Continuous Yuliya Aparicio MD 75 mL/hr at 03/28/19 1804      morphine (PF) injection 2 mg  2 mg Intravenous Q2H PRN Yuliya Aparicio MD        Or    morphine injection 4 mg  4 mg Intravenous Q2H PRN Yuliya Aparicio MD   4 mg at 03/29/19 0854    famotidine (PEPCID) injection 20 mg  20 mg 0.25    Types: Cigarettes   Smokeless Tobacco Never Used       Family History:     History reviewed. No pertinent family history. Review of Systems:     Constitutional: Denies fever, sweats, weight loss. .     Eyes: No visual changes or diplopia. No scleral icterus. ENT: No Headaches, no hearing loss, no  vertigo. No mouth sores or sore throat. Cardiovascular: No chest pain, no dyspnea on exertion, no palpitations, no  loss of consciousness. Respiratory: No cough, no  wheezing, no dyspnea, no sputum production. No hemoptysis. .    Gastrointestinal: No abdominal pain, no appetite loss, no blood in stools. No change in bowel habits. Genitourinary: No dysuria, trouble voiding, or hematuria. Musculoskeletal:  Generalized weakness. No joint complaints. Integumentary: No rash or pruritis. Neurological: No headache, diplopia. No change in gait, balance, or coordination. No paresthesias. Endocrine: No temperature intolerance. No excessive thirst, fluid intake, or urination. Hematologic/Lymphatic: No abnormal bruising or ecchymoses, no blood clots or swollen lymph nodes. Allergic/Immunologic: No nasal congestion or hives. Physical Exam:     /74   Pulse 85   Temp 98.5 °F (36.9 °C) (Oral)   Resp 16   Ht 4' 10\" (1.473 m)   Wt 113 lb (51.3 kg)   SpO2 96%   BMI 23.62 kg/m²     CONSTITUTIONAL: awake, alert, cooperative, no apparent distress. EYES:  Pupils equal, round and reactive to light, sclera non-icteric, conjunctiva normal  ENT:  Normocephalic, without obvious abnormality, atraumatic, sinuses nontender on palpation, external ears without lesions, oral pharynx with moist mucus membranes, no mucositis.   NECK:  Supple, symmetrical, trachea midline, no adenopathy, thyroid symmetric, not enlarged and no tenderness, skin normal  HEMATOLOGIC/LYMPHATICS:  no cervical lymphadenopathy, no supraclavicular lymphadenopathy, no axillary lymphadenopathy and no inguinal lymphadenopathy  BACK:  Symmetric, no curvature, spinous processes are non-tender on palpation, paraspinous muscles are non-tender on palpation, no costal vertebral tenderness  LUNGS:  Clear to auscultation bilaterally, no crackles or wheezing  CARDIOVASCULAR:  Regular rate and rhythm, normal S1 and S2, no S3 or S4, and no murmur noted  ABDOMEN:  Stoma is pink with brown output, abd is soft, non tender, +BS, no masses   MUSCULOSKELETAL:  There is no redness, warmth, or swelling of the joints  NEUROLOGIC:   No focal findings. SKIN:  Scattered bruising and ecchymoses. EXT: without clubbing, cyanosis or edema. Data:     CBC:  Recent Labs     03/28/19  0845 03/25/19  0600 03/23/19  0559   WBC 10.0 12.5* 13.4*   HGB 7.7* 8.8* 7.9*   HCT 23.5* 27.5* 24.4*   MCV 95.8 97.0 96.3    442 404       BMP:  Recent Labs     03/28/19  0845 03/25/19  0600 03/23/19  0559  03/01/19 1933    138 135*   < > 119*   K 4.8 3.6 3.1*   < > 3.0*   CO2 31 26 26   < > 20*   BUN <2* 4* 4*   < > 15   CREATININE <0.5* <0.5* <0.5*   < > <0.5*   MG  --  1.80  --   --  1.80    < > = values in this interval not displayed. HEPATIC:  Recent Labs     03/20/19  1956 03/15/19  0156 03/01/19 1933   AST 14* 9* 31   ALT <5* <5* 10   ALKPHOS 95 66 61   PROT 6.8 5.9* 6.2*   BILITOT <0.2 <0.2 0.5       TUMOR MARKERS:  No results for input(s): PSA, CEA, , [id number],  in the last 720 hours.       MAGNESIUM:    Lab Results   Component Value Date    MG 1.80 03/25/2019       PT/INR:    No results found for: PTINR    Lab Results   Component Value Date    INR 1.39 03/28/2019       PTT:    No results found for: APTT    U/A:    Lab Results   Component Value Date    NITRITE negative 05/28/2013    COLORU Yellow 03/27/2019    PHUR 7.5 03/27/2019    LABCAST 3-5 WBC 01/26/2016    WBCUA 3-5 03/20/2019    RBCUA None seen 03/20/2019    MUCUS 3+ 02/25/2019    TRICHOMONAS Present 02/02/2013    BACTERIA Rare 03/15/2019    CLARITYU Clear 03/27/2019    SPECGRAV 1.010 03/27/2019 LEUKOCYTESUR Negative 03/27/2019    UROBILINOGEN 0.2 03/27/2019    BILIRUBINUR Negative 03/27/2019    BILIRUBINUR small 05/28/2013    BLOODU Negative 03/27/2019    GLUCOSEU Negative 03/27/2019    AMORPHOUS Rare 03/20/2019       Imaging:     Type of Study:        Veins:Lower Extremities DVT Study, VASC EXTREMITY VENOUS DUPLEX BILATERAL.       Tech Comments   Right   1. Technically limited exam due to edema. There is complete compressibility of   all deep and superficial veins seen in the lower extremity. 2. There is normal spontaneous and phasic flow throughout the deep and   superficial veins of the right lower extremity. Left   1. Technically limited exam due to edema. There is hypo and hyperechoic   partially occluding material present in the lumen of the common femoral vein   and hyperechoic non occlusive material present within the femoral vein   (proximal thigh). There is complete compressibility of all other deep and   superficial veins seen throughout the lower extremity. 2. There is normal spontaneous and phasic flow throughout the deep and   superficial veins of the left lower extremity. CT C/A/P 3-28-19   Bilateral effusions, larger on the right.  There is associated airspace   disease and scattered ground-glass opacities which could be   infectious/inflammatory.  Consider short interval follow-up chest CT in 3   months after treatment.       Lateral approach drainage catheters in place with decreased size of the   perihepatic and right paracolic gutter collections.       3.8 cm fluid containing structure in the right abdomen which could represent   a loop of bowel.  However, this has been present on several prior studies and   does not contain any oral contrast on the current study which raises the   possibility of interloop abscess.      CT A/P 3-21-19     Moderate residual abscess along the anterior hepatic margin, anterior to the   right lateral pigtail drainage catheter.       Small amount of loculated fluid along the right pericolic gutter, extending   inferiorly to the pelvis, compatible with additional component of abscess.       Moderate right pleural effusion and trace left pleural effusion.  Bibasilar   atelectasis.       Anasarca.       Nonocclusive thrombus versus mixing artifact within right internal iliac vein. Impression:     Iliac Vein Thrombosis  Hx of DVT   Anemia   Post Traumatic Paraplegia    S/p Harmann's pouch with ostomy secondary to perforated sigmoid colon on 3/2/19   Intra-abdominal abscess with perc drain 3/21, now on Merrem and Diflucan    Anemia of chronic illness   Malnutrition     Plan:     Iliac Vein Thrombosis, immobility, paraplegic, post op Greer Frisk pouch for intra-abdominal abscess   - Factor 5 Leiden and prothrombin gene sent  - Check anti-phospholipids   - Remaining hyper coag work up outpatient   - Will need to be on Xarelto (was on this previously and stopped due to non compliance). I told the patient she would need Xarelto LIFELONG to prevent a deadly blood clot. She seemed to understand. Currently on treatment dose Lovenox- okay to continue and transition to Xarelto on discharge. Will need 15 mg PO BID X 21 days then 20 mg PO daily.   - Hx of DVT     Anemia  - Hgb 7.7, transfuse to keep above 7   - check micronutrients and replace as needed     Discussed with Dr. Garry Ayala                 Thank you very much for allowing me to participate in the care of this patient.     Jose Roberto Kim CNP   Medical Oncology/Hematology    [facility] - [address],  [place] [address]  Phone: [phone]  Fax: [phone]

## 2019-03-30 ENCOUNTER — APPOINTMENT (OUTPATIENT)
Dept: GENERAL RADIOLOGY | Age: 29
DRG: 862 | End: 2019-03-30
Payer: MEDICARE

## 2019-03-30 PROBLEM — J94.8 HYDROPNEUMOTHORAX: Status: ACTIVE | Noted: 2019-03-30

## 2019-03-30 PROCEDURE — 6370000000 HC RX 637 (ALT 250 FOR IP): Performed by: INTERNAL MEDICINE

## 2019-03-30 PROCEDURE — 71045 X-RAY EXAM CHEST 1 VIEW: CPT

## 2019-03-30 PROCEDURE — 2500000003 HC RX 250 WO HCPCS: Performed by: SURGERY

## 2019-03-30 PROCEDURE — 99024 POSTOP FOLLOW-UP VISIT: CPT | Performed by: SURGERY

## 2019-03-30 PROCEDURE — 6360000002 HC RX W HCPCS: Performed by: SURGERY

## 2019-03-30 PROCEDURE — 2580000003 HC RX 258: Performed by: SURGERY

## 2019-03-30 PROCEDURE — 6370000000 HC RX 637 (ALT 250 FOR IP): Performed by: OBSTETRICS & GYNECOLOGY

## 2019-03-30 PROCEDURE — 6370000000 HC RX 637 (ALT 250 FOR IP): Performed by: SURGERY

## 2019-03-30 PROCEDURE — 99232 SBSQ HOSP IP/OBS MODERATE 35: CPT | Performed by: INTERNAL MEDICINE

## 2019-03-30 PROCEDURE — 6360000002 HC RX W HCPCS: Performed by: INTERNAL MEDICINE

## 2019-03-30 PROCEDURE — 1200000000 HC SEMI PRIVATE

## 2019-03-30 RX ORDER — FOLIC ACID 1 MG/1
1 TABLET ORAL DAILY
Status: DISCONTINUED | OUTPATIENT
Start: 2019-03-30 | End: 2019-04-06 | Stop reason: HOSPADM

## 2019-03-30 RX ADMIN — Medication 10 ML: at 08:41

## 2019-03-30 RX ADMIN — POTASSIUM CHLORIDE 10 MEQ: 750 TABLET, EXTENDED RELEASE ORAL at 20:23

## 2019-03-30 RX ADMIN — CLOTRIMAZOLE: 10 CREAM TOPICAL at 08:51

## 2019-03-30 RX ADMIN — FOLIC ACID 1 MG: 1 TABLET ORAL at 08:41

## 2019-03-30 RX ADMIN — POLYETHYLENE GLYCOL 3350 17 G: 17 POWDER, FOR SOLUTION ORAL at 08:41

## 2019-03-30 RX ADMIN — FAMOTIDINE 20 MG: 10 INJECTION, SOLUTION INTRAVENOUS at 08:41

## 2019-03-30 RX ADMIN — MORPHINE SULFATE 4 MG: 4 INJECTION INTRAVENOUS at 04:29

## 2019-03-30 RX ADMIN — MORPHINE SULFATE 4 MG: 4 INJECTION INTRAVENOUS at 11:07

## 2019-03-30 RX ADMIN — CLOTRIMAZOLE: 10 CREAM TOPICAL at 20:23

## 2019-03-30 RX ADMIN — ENOXAPARIN SODIUM 50 MG: 60 INJECTION SUBCUTANEOUS at 20:23

## 2019-03-30 RX ADMIN — OXYCODONE HYDROCHLORIDE 10 MG: 5 TABLET ORAL at 15:17

## 2019-03-30 RX ADMIN — MEROPENEM 1 G: 1 INJECTION, POWDER, FOR SOLUTION INTRAVENOUS at 23:48

## 2019-03-30 RX ADMIN — FLUCONAZOLE 100 MG: 100 TABLET ORAL at 08:41

## 2019-03-30 RX ADMIN — MORPHINE SULFATE 4 MG: 4 INJECTION INTRAVENOUS at 13:16

## 2019-03-30 RX ADMIN — FAMOTIDINE 20 MG: 10 INJECTION, SOLUTION INTRAVENOUS at 20:23

## 2019-03-30 RX ADMIN — MORPHINE SULFATE 4 MG: 4 INJECTION INTRAVENOUS at 20:23

## 2019-03-30 RX ADMIN — ENOXAPARIN SODIUM 50 MG: 60 INJECTION SUBCUTANEOUS at 08:41

## 2019-03-30 RX ADMIN — POTASSIUM CHLORIDE 10 MEQ: 750 TABLET, EXTENDED RELEASE ORAL at 08:41

## 2019-03-30 RX ADMIN — MEROPENEM 1 G: 1 INJECTION, POWDER, FOR SOLUTION INTRAVENOUS at 07:30

## 2019-03-30 RX ADMIN — SODIUM CHLORIDE: 9 INJECTION, SOLUTION INTRAVENOUS at 11:52

## 2019-03-30 RX ADMIN — MEROPENEM 1 G: 1 INJECTION, POWDER, FOR SOLUTION INTRAVENOUS at 15:09

## 2019-03-30 RX ADMIN — MORPHINE SULFATE 4 MG: 4 INJECTION INTRAVENOUS at 22:56

## 2019-03-30 RX ADMIN — MORPHINE SULFATE 4 MG: 4 INJECTION INTRAVENOUS at 08:35

## 2019-03-30 RX ADMIN — COLLAGENASE SANTYL: 250 OINTMENT TOPICAL at 08:42

## 2019-03-30 ASSESSMENT — PAIN SCALES - GENERAL
PAINLEVEL_OUTOF10: 10
PAINLEVEL_OUTOF10: 8
PAINLEVEL_OUTOF10: 8
PAINLEVEL_OUTOF10: 10
PAINLEVEL_OUTOF10: 10
PAINLEVEL_OUTOF10: 9

## 2019-03-30 ASSESSMENT — PAIN DESCRIPTION - PAIN TYPE: TYPE: ACUTE PAIN

## 2019-03-30 ASSESSMENT — PAIN DESCRIPTION - LOCATION: LOCATION: BACK;CHEST

## 2019-03-30 NOTE — PROGRESS NOTES
INPATIENT PULMONARY CRITICAL CARE PROGRESS NOTE      SUBJECTIVE:  Patient when seen was not having any increased cough/expectoration/sob/wheezing;patient has had some intermittent chest pain at the chest tube site; Patient had Tmax of 101.8 yesterday but is afebrile this morning; , patient has normal sinus rhythm on the monitor, patient was not hypoxemic and was on room air oxygen with saturation of 96%, patient was hemodynamically maintained, patient's chest tube output was 150 mL, patient has had good urine output, patient's cumulative fluid balance is -15.5 L, patient's oral intake is improving, patient's glycemic control is acceptable, no other pertinent review of system of concern    Physical Exam:  Blood pressure 111/73, pulse 84, temperature 98.5 °F (36.9 °C), temperature source Oral, resp. rate 16, height 4' 10\" (1.473 m), weight 113 lb (51.3 kg), SpO2 97 %, not currently breastfeeding.'   Constitutional:   Thin built, underweight appearing. No acute distress. HENT:  Oropharynx is clear and moist.  Class II airway. Bitemporal muscle wasting. Eyes:  Conjunctivae are normal. No scleral icterus. Wearing glasses. Neck:  No JVD. Cardiovascular: Sinus tachycardia, regular rhythm, normal heart sounds. Pulmonary/Chest: No wheezes. No rales. Reduced air entry right hemithorax. No accessory muscle usage or stridor. Abdominal: Distended, gr present,  does not have any distinct tenderness when seen . Musculoskeletal: No cyanosis. No clubbing. No obvious joint deformity. poor muscle mass. Lymphadenopathy: Deferred. Skin: Skin is warm and dry. No rash or nodules on the exposed extremities. Psychiatric: Appears anxious. Behavior is normal.  No anxiety. Neurologic: Alert, awake and oriented. PERRL.   Speech fluent      Results:  CBC:   Recent Labs     03/28/19  0845   WBC 10.0   HGB 7.7*   HCT 23.5*   MCV 95.8        BMP:   Recent Labs     03/28/19  0845      K 4.8      CO2 31   BUN <2*   CREATININE <0.5*     LIVER PROFILE: No results for input(s): AST, ALT, LIPASE, BILIDIR, BILITOT, ALKPHOS in the last 72 hours. Invalid input(s): AMYLASE,  ALB  PT/INR:   Recent Labs     03/28/19  0845   PROTIME 15.9*   INR 1.39*     APTT: No results for input(s): APTT in the last 72 hours. UA:  Recent Labs     03/27/19  2155   COLORU Yellow   PHUR 7.5   CLARITYU Clear   SPECGRAV 1.010   LEUKOCYTESUR Negative   UROBILINOGEN 0.2   BILIRUBINUR Negative   BLOODU Negative   GLUCOSEU Negative       Imaging:  I have reviewed radiology images personally. XR CHEST PORTABLE   Final Result   1. No significant change. XR CHEST PORTABLE   Final Result   Small to moderate right-sided pneumothorax with decreased pleural fluid. CT GUIDED PLEURAL DRAINAGE W CATH PERC   Final Result   Successful CT guided placement of a right chest tube         VL Extremity Venous Bilateral   Final Result      CT CHEST ABDOMEN PELVIS W CONTRAST   Final Result   Addendum 1 of 1   ADDENDUM:   Impression section should include:      Filling defect in the right internal iliac vein on series 3, image 111 is   again noted. Findings highly suspicious for thrombus. The findings were sent to the Radiology Results [address] at    12:18   pm on 3/28/2019to be communicated to a licensed caregiver. Final      XR CHEST PORTABLE   Final Result   Interval development of moderate partially loculated right pleural effusion. A the aerated right lung shows opacification compatible with atelectasis   and/or pneumonia. IR PICC WO SQ PORT/PUMP > 5 YEARS   Final Result   Successful placement of PICC line. CT ABDOMEN PELVIS W IV CONTRAST Additional Contrast? None   Final Result   Moderate residual abscess along the anterior hepatic margin, anterior to the   right lateral pigtail drainage catheter.       Small amount of loculated fluid along the right pericolic gutter, extending   inferiorly to improving  Severe malnutrition. Poor by mouth intake has lost weight  DVT prophylaxis. On Lovenox.            Electronically signed by:  Radha Alatorre MD    3/30/2019    11:03 AM.

## 2019-03-30 NOTE — PROGRESS NOTES
Rales/Wheezes/Rhonchi. Chest tube in place  Cardiovascular: Regular rate and rhythm with normal S1/S2 without murmurs, rubs or gallops. Abdomen: Patient with known intra-abdominal postoperative conditions. Generalized tenderness. Musculoskeletal: No clubbing, cyanosis or edema bilaterally. Chronic deformities caused by paraplegia, no acute changes. Skin: Skin color, texture, turgor normal.  No rashes or lesions. Neurologic:  Patient is paraplegic. Lower extremity motor and sensory function is impaired on a chronic basis. No new focal changes. Psychiatric: Alert and oriented, thought content appropriate, normal insight  Capillary Refill: Brisk,< 3 seconds   Peripheral Pulses: +2 palpable, equal bilaterally     I examined the patient today (03/30/19), physical exam is similar to yesterday (3/29)      Labs:   Recent Labs     03/28/19  0845   WBC 10.0   HGB 7.7*   HCT 23.5*        Recent Labs     03/28/19  0845      K 4.8      CO2 31   BUN <2*   CREATININE <0.5*   CALCIUM 7.3*     No results for input(s): AST, ALT, BILIDIR, BILITOT, ALKPHOS in the last 72 hours. Recent Labs     03/28/19  0845   INR 1.39*     No results for input(s): Irvin Milch in the last 72 hours. Urinalysis:      Lab Results   Component Value Date    NITRU Negative 03/27/2019    WBCUA 3-5 03/20/2019    BACTERIA Rare 03/15/2019    RBCUA None seen 03/20/2019    BLOODU Negative 03/27/2019    SPECGRAV 1.010 03/27/2019    GLUCOSEU Negative 03/27/2019       Radiology:  XR CHEST PORTABLE   Final Result   1. No significant change. XR CHEST PORTABLE   Final Result   Small to moderate right-sided pneumothorax with decreased pleural fluid.          CT GUIDED PLEURAL DRAINAGE W CATH PERC   Final Result   Successful CT guided placement of a right chest tube         VL Extremity Venous Bilateral   Final Result      CT CHEST ABDOMEN PELVIS W CONTRAST   Final Result   Addendum 1 of 1   ADDENDUM:   Impression section should include:      Filling defect in the right internal iliac vein on series 3, image 111 is   again noted. Findings highly suspicious for thrombus. The findings were sent to the Radiology Results [id number] at    12:18   pm on 3/28/2019to be communicated to a licensed caregiver. Final      XR CHEST PORTABLE   Final Result   Interval development of moderate partially loculated right pleural effusion. A the aerated right lung shows opacification compatible with atelectasis   and/or pneumonia. IR PICC WO SQ PORT/PUMP > 5 YEARS   Final Result   Successful placement of PICC line. CT ABDOMEN PELVIS W IV CONTRAST Additional Contrast? None   Final Result   Moderate residual abscess along the anterior hepatic margin, anterior to the   right lateral pigtail drainage catheter. Small amount of loculated fluid along the right pericolic gutter, extending   inferiorly to the pelvis, compatible with additional component of abscess. Moderate right pleural effusion and trace left pleural effusion. Bibasilar   atelectasis. Anasarca. Nonocclusive thrombus versus mixing artifact within right internal iliac vein. Assessment/Plan:    Active Hospital Problems    Diagnosis Date Noted    Pneumothorax [J93.9]     Pressure injury of sacral region, unstageable ([address] [L89.150] 03/28/2019    Loculated pleural effusion [J90]     Abnormal CT scan, chest [R93.89]     Acute deep vein thrombosis (DVT) of non-extremity vein [I82.90]     Anemia [D64.9]     Rupture of operation wound [T81.31XA]     Moderate malnutrition ([address] [E44.0] 03/21/2019    Generalized abdominal pain [R10.84]     Post-operative nausea and vomiting [R11.2, Z98.890] 03/20/2019     PLAN:    Iliac vein thrombosis  Patient is paraplegic.   Had lower extremity DVTs in the past, and required anticoagulation  Has proximal femoral as well as iliac vein thrombosis  Likely to persist for life due to patient's chronic immobility  Continue Lovenox while the patient is in-house, proceed with oral Xarelto indefinitely.     Pleural effusion  Chest tube in place. Continue to monitor. No changes     Intraabdominal abscess post sigmoid perforation  Primary attending to follow and manage. On IV meropenem.     Anemia  No evidence of acute blood loss  Most likely caused by chronic illness. Monitor and transfuse as needed.     Paraplegia  Patient is stable at baseline.         DVT Prophylaxis: Therapeutic anticoagulation as described above  Diet: DIET LOW FIBER;  Code Status: Full Code    PT/OT Eval Status: NA    Dispo - inpatient    Rafi Flores MD

## 2019-03-30 NOTE — PROGRESS NOTES
Patient assessment complete and documented. VSS. A&O x4. Patient resting in bed with no complaints at this time. Wound Vac in place as well as chest tube. Patient denies any needs at this time. Bed I lowest locked position with call light within reach. Will continue to monitor.

## 2019-03-30 NOTE — PROGRESS NOTES
[facility] GENERAL SURGERY    Surgery Progress Note           PO    PATIENT NAME: Jackelyn Payton     TODAY'S DATE: 3/30/2019    INTERVAL HISTORY:    Pt with some pain at chest tube site. OBJECTIVE:   VITALS:  /73   Pulse 84   Temp 98.5 °F (36.9 °C) (Oral)   Resp 16   Ht 4' 10\" (1.473 m)   Wt 113 lb (51.3 kg)   SpO2 97%   BMI 23.62 kg/m²     INTAKE/OUTPUT:    I/O last 3 completed shifts: In: 18 [P.O.:960; I.V.:600]  Out: 3226 [Urine:3100; Drains:60; Stool:125; Chest Tube:150]  No intake/output data recorded. CONSTITUTIONAL:  awake and alert  LUNGS:  Chest tube serous  ABDOMEN:   normal bowel sounds, soft, non-distended, minimal tender, ostomy functional, perc drains serous   INCISION: vac in palce    Data:  CBC:   Recent Labs     03/28/19  0845   WBC 10.0   HGB 7.7*   HCT 23.5*        BMP:    Recent Labs     03/28/19  0845      K 4.8      CO2 31   BUN <2*   CREATININE <0.5*   GLUCOSE 89     Hepatic: No results for input(s): AST, ALT, ALB, BILITOT, ALKPHOS in the last 72 hours. Mag:    No results for input(s): MG in the last 72 hours. Phos:   No results for input(s): PHOS in the last 72 hours. INR:   Recent Labs     03/28/19  0845   INR 1.39*         Radiology Review:  X CXR - no change    ASSESSMENT AND PLAN:  34 y.o. female status post jose rafael's procedure, perc drain abscesses, chest tube  1. Continue chest tube per Pulm recs  2.  abx for abscesses  3. Diet tolerated  4.   Po pain control        Electronically signed by Gabino Jovel MD

## 2019-03-31 LAB
BODY FLUID CULTURE, STERILE: NORMAL
GRAM STAIN RESULT: NORMAL

## 2019-03-31 PROCEDURE — 6360000002 HC RX W HCPCS: Performed by: SURGERY

## 2019-03-31 PROCEDURE — 6370000000 HC RX 637 (ALT 250 FOR IP): Performed by: OBSTETRICS & GYNECOLOGY

## 2019-03-31 PROCEDURE — 6360000002 HC RX W HCPCS: Performed by: INTERNAL MEDICINE

## 2019-03-31 PROCEDURE — 99024 POSTOP FOLLOW-UP VISIT: CPT | Performed by: SURGERY

## 2019-03-31 PROCEDURE — 6370000000 HC RX 637 (ALT 250 FOR IP): Performed by: INTERNAL MEDICINE

## 2019-03-31 PROCEDURE — 2580000003 HC RX 258: Performed by: SURGERY

## 2019-03-31 PROCEDURE — 2500000003 HC RX 250 WO HCPCS: Performed by: SURGERY

## 2019-03-31 PROCEDURE — 6370000000 HC RX 637 (ALT 250 FOR IP): Performed by: SURGERY

## 2019-03-31 PROCEDURE — 99232 SBSQ HOSP IP/OBS MODERATE 35: CPT | Performed by: INTERNAL MEDICINE

## 2019-03-31 PROCEDURE — 1200000000 HC SEMI PRIVATE

## 2019-03-31 RX ADMIN — ENOXAPARIN SODIUM 50 MG: 60 INJECTION SUBCUTANEOUS at 09:07

## 2019-03-31 RX ADMIN — FAMOTIDINE 20 MG: 10 INJECTION, SOLUTION INTRAVENOUS at 19:35

## 2019-03-31 RX ADMIN — FOLIC ACID 1 MG: 1 TABLET ORAL at 09:07

## 2019-03-31 RX ADMIN — POTASSIUM CHLORIDE 10 MEQ: 750 TABLET, EXTENDED RELEASE ORAL at 19:35

## 2019-03-31 RX ADMIN — OXYCODONE HYDROCHLORIDE 10 MG: 5 TABLET ORAL at 16:20

## 2019-03-31 RX ADMIN — COLLAGENASE SANTYL: 250 OINTMENT TOPICAL at 09:07

## 2019-03-31 RX ADMIN — CLOTRIMAZOLE: 10 CREAM TOPICAL at 09:07

## 2019-03-31 RX ADMIN — MEROPENEM 1 G: 1 INJECTION, POWDER, FOR SOLUTION INTRAVENOUS at 15:02

## 2019-03-31 RX ADMIN — MORPHINE SULFATE 4 MG: 4 INJECTION INTRAVENOUS at 08:46

## 2019-03-31 RX ADMIN — MORPHINE SULFATE 4 MG: 4 INJECTION INTRAVENOUS at 19:34

## 2019-03-31 RX ADMIN — CLOTRIMAZOLE: 10 CREAM TOPICAL at 19:35

## 2019-03-31 RX ADMIN — Medication 10 ML: at 09:07

## 2019-03-31 RX ADMIN — ENOXAPARIN SODIUM 50 MG: 60 INJECTION SUBCUTANEOUS at 19:36

## 2019-03-31 RX ADMIN — MORPHINE SULFATE 4 MG: 4 INJECTION INTRAVENOUS at 05:44

## 2019-03-31 RX ADMIN — MORPHINE SULFATE 4 MG: 4 INJECTION INTRAVENOUS at 13:38

## 2019-03-31 RX ADMIN — Medication 10 ML: at 19:36

## 2019-03-31 RX ADMIN — MEROPENEM 1 G: 1 INJECTION, POWDER, FOR SOLUTION INTRAVENOUS at 23:14

## 2019-03-31 RX ADMIN — FAMOTIDINE 20 MG: 10 INJECTION, SOLUTION INTRAVENOUS at 09:06

## 2019-03-31 RX ADMIN — POTASSIUM CHLORIDE 10 MEQ: 750 TABLET, EXTENDED RELEASE ORAL at 09:07

## 2019-03-31 RX ADMIN — MORPHINE SULFATE 4 MG: 4 INJECTION INTRAVENOUS at 22:30

## 2019-03-31 RX ADMIN — MORPHINE SULFATE 4 MG: 4 INJECTION INTRAVENOUS at 11:28

## 2019-03-31 RX ADMIN — FLUCONAZOLE 100 MG: 100 TABLET ORAL at 09:07

## 2019-03-31 RX ADMIN — MORPHINE SULFATE 4 MG: 4 INJECTION INTRAVENOUS at 03:07

## 2019-03-31 RX ADMIN — MEROPENEM 1 G: 1 INJECTION, POWDER, FOR SOLUTION INTRAVENOUS at 06:28

## 2019-03-31 ASSESSMENT — PAIN SCALES - GENERAL
PAINLEVEL_OUTOF10: 10
PAINLEVEL_OUTOF10: 8
PAINLEVEL_OUTOF10: 9
PAINLEVEL_OUTOF10: 10

## 2019-03-31 NOTE — PROGRESS NOTES
INPATIENT PULMONARY CRITICAL CARE PROGRESS NOTE      SUBJECTIVE:  Patient when seen was not having any increased cough/expectoration/sob/wheezing;patient complaining of back pain ; Patient had Tmax of 100 F yesterday but is afebrile this morning; , patient has normal sinus rhythm on the monitor, patient was not hypoxemic and was on room air oxygen with saturation of 96%, patient was hemodynamically maintained, patient's chest tube output was 10 mL, patient has had good urine output, patient's cumulative fluid balance is -17.4 L,  patient's glycemic control is acceptable, no other pertinent review of system of concern    Physical Exam:  Blood pressure 112/73, pulse 75, temperature 98.6 °F (37 °C), temperature source Oral, resp. rate 16, height 4' 10\" (1.473 m), weight 113 lb (51.3 kg), SpO2 98 %, not currently breastfeeding.'   Constitutional:   Thin built, underweight appearing. No acute distress. HENT:  Oropharynx is clear and moist.  Class II airway. Bitemporal muscle wasting. Eyes:  Conjunctivae are normal. No scleral icterus. Wearing glasses. Neck:  No JVD. Cardiovascular: Sinus tachycardia, regular rhythm, normal heart sounds. Pulmonary/Chest: No wheezes. No rales. Reduced air entry right hemithorax. No accessory muscle usage or stridor. Abdominal: Distended, gr present,  does not have any distinct tenderness when seen . Musculoskeletal: No cyanosis. No clubbing. No obvious joint deformity. poor muscle mass. Lymphadenopathy: Deferred. Skin: Skin is warm and dry. No rash or nodules on the exposed extremities. Psychiatric: Appears anxious. Behavior is normal.  No anxiety. Neurologic: Alert, awake and oriented. PERRL. Speech fluent        Imaging:  I have reviewed radiology images personally. XR CHEST PORTABLE   Final Result   1. No significant change. XR CHEST PORTABLE   Final Result   Small to moderate right-sided pneumothorax with decreased pleural fluid.          CT GUIDED PLEURAL DRAINAGE W CATH PERC   Final Result   Successful CT guided placement of a right chest tube         VL Extremity Venous Bilateral   Final Result      CT CHEST ABDOMEN PELVIS W CONTRAST   Final Result   Addendum 1 of 1   ADDENDUM:   Impression section should include:      Filling defect in the right internal iliac vein on series 3, image 111 is   again noted. Findings highly suspicious for thrombus. The findings were sent to the Radiology Results [address] at    12:18   pm on 3/28/2019to be communicated to a licensed caregiver. Final      XR CHEST PORTABLE   Final Result   Interval development of moderate partially loculated right pleural effusion. A the aerated right lung shows opacification compatible with atelectasis   and/or pneumonia. IR PICC WO SQ PORT/PUMP > 5 YEARS   Final Result   Successful placement of PICC line. CT ABDOMEN PELVIS W IV CONTRAST Additional Contrast? None   Final Result   Moderate residual abscess along the anterior hepatic margin, anterior to the   right lateral pigtail drainage catheter. Small amount of loculated fluid along the right pericolic gutter, extending   inferiorly to the pelvis, compatible with additional component of abscess. Moderate right pleural effusion and trace left pleural effusion. Bibasilar   atelectasis. Anasarca. Nonocclusive thrombus versus mixing artifact within right internal iliac vein. Results for Moy Sarmiento (MRN [id number]) as of 3/31/2019 12:25   Ref. Range 3/27/2019 15:50 3/27/2019 21:55 3/27/2019 22:42 3/28/2019 16:55   BODY FLUID CULTURE Unknown    Rpt   CULTURE BLOOD #1 Unknown  Rpt     CULTURE BLOOD #2 Unknown   Rpt    Culture, Blood 2 Unknown   No Growth to date. .. Gram Stain Result Unknown    Cytospin performe. .. Body Fluid Culture, Sterile Unknown    No growth 60-72 h. ..   C. trachomatis DNA Latest Ref Range: Negative  Negative      N. gonorrhoeae DNA Latest Ref Range: Negative  Negative          Assessment and plan:  Complicated/loculated right pleural effusion. Status post chest tube placement. Fluid appears borderline transudate/exudate, culture -no growth so far   HydroPneumothorax. Small to moderate. Conservative management, placed on suction. Repeat CXR daily. Abnormal CT chest.  The minimal groundglass opacities probably represent mucous plugging. Aggressive pulmonary toilet. Smoker, marijuana use. Should quit smoking altogether. Subphrenic abscess, possible interloop abscess, wound dehiscence. Per surgery, drains and wound VAC. Right iliac vein thrombus. Anticoagulation after chest tube placed, reviewed by oncology, recommending Xarelto. Abdominal venous thrombosis would not be unexpected with intra abdominal inflammatory process, doubt hypercoagulable state  Anemia. Transfuse for Hb< 7g/dL. Leukocytosis. Was improving  Severe malnutrition. Poor by mouth intake has lost weight  DVT prophylaxis. On Lovenox.      Case d/w nursing           Electronically signed by:  Juliane Caceres MD    3/31/2019    12:22 PM.

## 2019-03-31 NOTE — PROGRESS NOTES
[facility] GENERAL SURGERY    Surgery Progress Note            PATIENT NAME: Yadira Villalta     TODAY'S DATE: 3/31/2019    INTERVAL HISTORY:    Pt stable. OBJECTIVE:   VITALS:  /73   Pulse 75   Temp 98.6 °F (37 °C) (Oral)   Resp 16   Ht 4' 10\" (1.473 m)   Wt 113 lb (51.3 kg)   SpO2 98%   BMI 23.62 kg/m²     INTAKE/OUTPUT:    I/O last 3 completed shifts: In: 2214 [P.O.:598; I.V.:1616]  Out: 6260 [Urine:5875; Stool:375; Chest Tube:10]  No intake/output data recorded. CONSTITUTIONAL:  awake and alert  LUNGS:  Chest tube serous  ABDOMEN:   normal bowel sounds, soft, non-distended, tender, ostomy functional, perc drains serous   INCISION: vac in place    Data:  CBC: No results for input(s): WBC, HGB, HCT, PLT in the last 72 hours. BMP:  No results for input(s): NA, K, CL, CO2, BUN, CREATININE, GLUCOSE in the last 72 hours. Hepatic: No results for input(s): AST, ALT, ALB, BILITOT, ALKPHOS in the last 72 hours. Mag:    No results for input(s): MG in the last 72 hours. Phos:   No results for input(s): PHOS in the last 72 hours. INR: No results for input(s): INR in the last 72 hours. Radiology Review:  NA    ASSESSMENT AND PLAN:  34 y.o. female status post jose rafael's procedure, perc drain abscesses, chest tube for effusion  1. Regular diet  2. Po pain control  3. Continue abx  4.   Chest tube per pulmonology         Electronically signed by Gisel Eldridge MD

## 2019-03-31 NOTE — PROGRESS NOTES
Hospitalist Progress Note      PCP: No primary care provider on file. 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 Venous Dopplers of lower extremity showed proximal femoral vein thrombosis. Started on therapeutic anticoagulation, which she will need lifetime    Subjective: No chest pain, no shortness of breath, no nausea, no vomiting. Chronic abdominal pain at baseline. Stable past few days      Medications:  Reviewed    Infusion Medications     Scheduled Medications    folic acid  1 mg Oral Daily    enoxaparin  1 mg/kg Subcutaneous BID    clotrimazole   Topical BID    fluconazole  100 mg Oral Daily    meropenem  1 g Intravenous Q8H    polyethylene glycol  17 g Oral Daily    sodium chloride flush  10 mL Intravenous 2 times per day    potassium chloride  10 mEq Oral BID    collagenase   Topical Daily    famotidine (PEPCID) injection  20 mg Intravenous BID     PRN Meds: sodium chloride flush, oxyCODONE **OR** oxyCODONE, acetaminophen, ondansetron, morphine **OR** morphine      Intake/Output Summary (Last 24 hours) at 3/31/2019 1835  Last data filed at 3/31/2019 1621  Gross per 24 hour   Intake 2532 ml   Output 6460 ml   Net -3928 ml       Physical Exam Performed:    /71   Pulse 78   Temp 99.5 °F (37.5 °C) (Oral)   Resp 16   Ht 4' 10\" (1.473 m)   Wt 113 lb (51.3 kg)   SpO2 98%   BMI 23.62 kg/m²     General appearance: No apparent distress, appears stated age and cooperative. HEENT: Pupils equal, round, and reactive to light. Conjunctivae/corneas clear. Neck: Supple, with full range of motion. No jugular venous distention. Trachea midline. Respiratory:  Normal respiratory effort. Clear to auscultation, bilaterally without Rales/Wheezes/Rhonchi.   Chest tube in place  Cardiovascular: Regular rate and rhythm with normal S1/S2 without murmurs, rubs or gallops. Abdomen: Patient with known intra-abdominal postoperative conditions. Generalized tenderness. Musculoskeletal: No clubbing, cyanosis or edema bilaterally. Chronic deformities caused by paraplegia, no acute changes. Skin: Skin color, texture, turgor normal.  No rashes or lesions. Neurologic:  Patient is paraplegic. Lower extremity motor and sensory function is impaired on a chronic basis. No new focal changes. Psychiatric: Alert and oriented, thought content appropriate, normal insight  Capillary Refill: Brisk,< 3 seconds   Peripheral Pulses: +2 palpable, equal bilaterally     I examined the patient today (03/31/19), physical exam is similar to yesterday (3/30)      Labs:   No results for input(s): WBC, HGB, HCT, PLT in the last 72 hours. No results for input(s): NA, K, CL, CO2, BUN, CREATININE, CALCIUM, PHOS in the last 72 hours. Invalid input(s): MAGNES  No results for input(s): AST, ALT, BILIDIR, BILITOT, ALKPHOS in the last 72 hours. No results for input(s): INR in the last 72 hours. No results for input(s): Ellender Geren in the last 72 hours. Urinalysis:      Lab Results   Component Value Date    NITRU Negative 03/27/2019    WBCUA 3-5 03/20/2019    BACTERIA Rare 03/15/2019    RBCUA None seen 03/20/2019    BLOODU Negative 03/27/2019    SPECGRAV 1.010 03/27/2019    GLUCOSEU Negative 03/27/2019       Radiology:  XR CHEST PORTABLE   Final Result   1. No significant change. XR CHEST PORTABLE   Final Result   Small to moderate right-sided pneumothorax with decreased pleural fluid.          CT GUIDED PLEURAL DRAINAGE W CATH PERC   Final Result   Successful CT guided placement of a right chest tube         VL Extremity Venous Bilateral   Final Result      CT CHEST ABDOMEN PELVIS W CONTRAST   Final Result   Addendum 1 of 1   ADDENDUM:   Impression section should include:      Filling defect in the right internal iliac vein on series 3, image 111 is   again noted. Findings highly suspicious for thrombus. The findings were sent to the Radiology Results [address] at    12:18   pm on 3/28/2019to be communicated to a licensed caregiver. Final      XR CHEST PORTABLE   Final Result   Interval development of moderate partially loculated right pleural effusion. A the aerated right lung shows opacification compatible with atelectasis   and/or pneumonia. IR PICC WO SQ PORT/PUMP > 5 YEARS   Final Result   Successful placement of PICC line. CT ABDOMEN PELVIS W IV CONTRAST Additional Contrast? None   Final Result   Moderate residual abscess along the anterior hepatic margin, anterior to the   right lateral pigtail drainage catheter. Small amount of loculated fluid along the right pericolic gutter, extending   inferiorly to the pelvis, compatible with additional component of abscess. Moderate right pleural effusion and trace left pleural effusion. Bibasilar   atelectasis. Anasarca. Nonocclusive thrombus versus mixing artifact within right internal iliac vein. Assessment/Plan:    Active Hospital Problems    Diagnosis Date Noted    Hydropneumothorax [J94.8] 03/30/2019    Pneumothorax [J93.9]     Pressure injury of sacral region, unstageable [facility]) [L89.150] 03/28/2019    Loculated pleural effusion [J90]     Abnormal CT scan, chest [R93.89]     Acute deep vein thrombosis (DVT) of non-extremity vein [I82.90]     Anemia [D64.9]     Rupture of operation wound [T81.31XA]     Moderate malnutrition ([address] [E44.0] 03/21/2019    Generalized abdominal pain [R10.84]     Post-operative nausea and vomiting [R11.2, Z98.890] 03/20/2019     PLAN:    Iliac vein thrombosis  Patient is paraplegic. Had lower extremity DVTs in the past, and required anticoagulation.  Stopped Xarelto in the past with no apparent reason  Has proximal femoral as well as iliac vein thrombosis  Likely to persist for life due to patient's chronic immobility  Continue Lovenox while the patient is in-house, proceed with oral Xarelto indefinitely. Patient understands the need for compliance     Pleural effusion  Chest tube in place. Continue to monitor. No changes     Intraabdominal abscess post sigmoid perforation  Primary attending to follow and manage. On IV meropenem. Stable      Anemia  No evidence of acute blood loss  Most likely caused by chronic illness. Monitor and transfuse as needed.     Paraplegia  Patient is stable at baseline.     D/w patient, questions answered      DVT Prophylaxis: Therapeutic anticoagulation as described above  Diet: DIET LOW FIBER;  Code Status: Full Code    PT/OT Eval Status: NA    Dispo - inpatient    Joya Li MD

## 2019-03-31 NOTE — PROGRESS NOTES
Patient assessment complete and documented. VSS. A&O x4. Patient with complaints of pain 8 out of 10. Patient given PRN pain medication (see MAR). Patient denies any other needs at this time. Bed in lowest locked position with call light within reach.  Will continue to monitor

## 2019-04-01 ENCOUNTER — APPOINTMENT (OUTPATIENT)
Dept: GENERAL RADIOLOGY | Age: 29
DRG: 862 | End: 2019-04-01
Payer: MEDICARE

## 2019-04-01 LAB
BLOOD CULTURE, ROUTINE: NORMAL
CULTURE, BLOOD 2: NORMAL
PHOSPHATIDYLSERINE IGA ANTIBODY: 4 U/ML (ref 0–19)
PHOSPHATIDYLSERINE IGG ANTIBODY: 9 U/ML (ref 0–10)
PHOSPHATIDYLSERINE IGM ANTIBODY: 16 U/ML (ref 0–24)

## 2019-04-01 PROCEDURE — 99024 POSTOP FOLLOW-UP VISIT: CPT | Performed by: SURGERY

## 2019-04-01 PROCEDURE — 99232 SBSQ HOSP IP/OBS MODERATE 35: CPT | Performed by: INTERNAL MEDICINE

## 2019-04-01 PROCEDURE — 6370000000 HC RX 637 (ALT 250 FOR IP): Performed by: SURGERY

## 2019-04-01 PROCEDURE — 97110 THERAPEUTIC EXERCISES: CPT

## 2019-04-01 PROCEDURE — 6360000002 HC RX W HCPCS: Performed by: INTERNAL MEDICINE

## 2019-04-01 PROCEDURE — 6370000000 HC RX 637 (ALT 250 FOR IP): Performed by: NURSE PRACTITIONER

## 2019-04-01 PROCEDURE — 71045 X-RAY EXAM CHEST 1 VIEW: CPT

## 2019-04-01 PROCEDURE — 2580000003 HC RX 258: Performed by: SURGERY

## 2019-04-01 PROCEDURE — 97605 NEG PRS WND THER DME<=50SQCM: CPT

## 2019-04-01 PROCEDURE — 6360000002 HC RX W HCPCS: Performed by: SURGERY

## 2019-04-01 PROCEDURE — 2580000003 HC RX 258

## 2019-04-01 PROCEDURE — 2500000003 HC RX 250 WO HCPCS: Performed by: SURGERY

## 2019-04-01 PROCEDURE — APPSS45 APP SPLIT SHARED TIME 31-45 MINUTES: Performed by: CLINICAL NURSE SPECIALIST

## 2019-04-01 PROCEDURE — 84238 ASSAY NONENDOCRINE RECEPTOR: CPT

## 2019-04-01 PROCEDURE — 6370000000 HC RX 637 (ALT 250 FOR IP): Performed by: INTERNAL MEDICINE

## 2019-04-01 PROCEDURE — 1200000000 HC SEMI PRIVATE

## 2019-04-01 PROCEDURE — 6370000000 HC RX 637 (ALT 250 FOR IP): Performed by: OBSTETRICS & GYNECOLOGY

## 2019-04-01 RX ORDER — OXYCODONE HYDROCHLORIDE 5 MG/1
10 TABLET ORAL EVERY 6 HOURS
Status: DISCONTINUED | OUTPATIENT
Start: 2019-04-01 | End: 2019-04-06 | Stop reason: HOSPADM

## 2019-04-01 RX ORDER — SODIUM CHLORIDE 9 MG/ML
INJECTION, SOLUTION INTRAVENOUS
Status: COMPLETED
Start: 2019-04-01 | End: 2019-04-01

## 2019-04-01 RX ADMIN — POTASSIUM CHLORIDE 10 MEQ: 750 TABLET, EXTENDED RELEASE ORAL at 08:29

## 2019-04-01 RX ADMIN — FOLIC ACID 1 MG: 1 TABLET ORAL at 08:47

## 2019-04-01 RX ADMIN — ACETAMINOPHEN 650 MG: 325 TABLET ORAL at 23:47

## 2019-04-01 RX ADMIN — COLLAGENASE SANTYL: 250 OINTMENT TOPICAL at 13:45

## 2019-04-01 RX ADMIN — CLOTRIMAZOLE: 10 CREAM TOPICAL at 21:41

## 2019-04-01 RX ADMIN — MORPHINE SULFATE 4 MG: 4 INJECTION INTRAVENOUS at 11:26

## 2019-04-01 RX ADMIN — MORPHINE SULFATE 4 MG: 4 INJECTION INTRAVENOUS at 05:04

## 2019-04-01 RX ADMIN — ENOXAPARIN SODIUM 50 MG: 60 INJECTION SUBCUTANEOUS at 21:45

## 2019-04-01 RX ADMIN — MORPHINE SULFATE 4 MG: 4 INJECTION INTRAVENOUS at 08:23

## 2019-04-01 RX ADMIN — ENOXAPARIN SODIUM 50 MG: 60 INJECTION SUBCUTANEOUS at 08:37

## 2019-04-01 RX ADMIN — FLUCONAZOLE 100 MG: 100 TABLET ORAL at 08:29

## 2019-04-01 RX ADMIN — MORPHINE SULFATE 4 MG: 4 INJECTION INTRAVENOUS at 23:48

## 2019-04-01 RX ADMIN — OXYCODONE HYDROCHLORIDE 10 MG: 5 TABLET ORAL at 21:00

## 2019-04-01 RX ADMIN — FAMOTIDINE 20 MG: 10 INJECTION, SOLUTION INTRAVENOUS at 08:29

## 2019-04-01 RX ADMIN — SODIUM CHLORIDE: 900 INJECTION, SOLUTION INTRAVENOUS at 23:55

## 2019-04-01 RX ADMIN — Medication 10 ML: at 21:46

## 2019-04-01 RX ADMIN — OXYCODONE HYDROCHLORIDE 10 MG: 5 TABLET ORAL at 15:58

## 2019-04-01 RX ADMIN — MORPHINE SULFATE 4 MG: 4 INJECTION INTRAVENOUS at 02:16

## 2019-04-01 RX ADMIN — MORPHINE SULFATE 4 MG: 4 INJECTION INTRAVENOUS at 14:33

## 2019-04-01 RX ADMIN — MORPHINE SULFATE 4 MG: 4 INJECTION INTRAVENOUS at 19:38

## 2019-04-01 RX ADMIN — MEROPENEM 1 G: 1 INJECTION, POWDER, FOR SOLUTION INTRAVENOUS at 14:35

## 2019-04-01 RX ADMIN — POTASSIUM CHLORIDE 10 MEQ: 750 TABLET, EXTENDED RELEASE ORAL at 21:00

## 2019-04-01 RX ADMIN — CLOTRIMAZOLE: 10 CREAM TOPICAL at 08:39

## 2019-04-01 RX ADMIN — FAMOTIDINE 20 MG: 10 INJECTION, SOLUTION INTRAVENOUS at 21:00

## 2019-04-01 RX ADMIN — MEROPENEM 1 G: 1 INJECTION, POWDER, FOR SOLUTION INTRAVENOUS at 05:59

## 2019-04-01 RX ADMIN — MEROPENEM 1 G: 1 INJECTION, POWDER, FOR SOLUTION INTRAVENOUS at 23:48

## 2019-04-01 RX ADMIN — Medication 10 ML: at 08:38

## 2019-04-01 RX ADMIN — POLYETHYLENE GLYCOL 3350 17 G: 17 POWDER, FOR SOLUTION ORAL at 08:38

## 2019-04-01 ASSESSMENT — PAIN SCALES - GENERAL
PAINLEVEL_OUTOF10: 7
PAINLEVEL_OUTOF10: 10
PAINLEVEL_OUTOF10: 9
PAINLEVEL_OUTOF10: 7
PAINLEVEL_OUTOF10: 10
PAINLEVEL_OUTOF10: 9
PAINLEVEL_OUTOF10: 10
PAINLEVEL_OUTOF10: 7
PAINLEVEL_OUTOF10: 10
PAINLEVEL_OUTOF10: 10

## 2019-04-01 ASSESSMENT — PAIN DESCRIPTION - PAIN TYPE: TYPE: ACUTE PAIN;CHRONIC PAIN

## 2019-04-01 ASSESSMENT — PAIN DESCRIPTION - ORIENTATION: ORIENTATION: RIGHT;MID

## 2019-04-01 ASSESSMENT — PAIN DESCRIPTION - LOCATION: LOCATION: BACK;CHEST

## 2019-04-01 NOTE — PROGRESS NOTES
Patient assessment complete and documented. VSS. A&O x4. Patient with complaints of pain due to chest tube. Patient given PRN pain medication (see MAR). Patient turned and assisted with being pulled up in bed. Patient denies any further needs at this time. Will continue to monitor.

## 2019-04-01 NOTE — PROGRESS NOTES
Hospitalist Progress Note      PCP: No primary care provider on file. 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 Venous Dopplers of lower extremity showed proximal femoral vein thrombosis. Started on therapeutic anticoagulation. Subjective: No chest pain, no shortness of breath, no nausea, no vomiting. Chronic abdominal pain at baseline. Stable past few days      Medications:  Reviewed    Infusion Medications     Scheduled Medications    oxyCODONE  10 mg Oral L9Y    folic acid  1 mg Oral Daily    enoxaparin  1 mg/kg Subcutaneous BID    clotrimazole   Topical BID    fluconazole  100 mg Oral Daily    meropenem  1 g Intravenous Q8H    polyethylene glycol  17 g Oral Daily    sodium chloride flush  10 mL Intravenous 2 times per day    potassium chloride  10 mEq Oral BID    collagenase   Topical Daily    famotidine (PEPCID) injection  20 mg Intravenous BID     PRN Meds: sodium chloride flush, acetaminophen, ondansetron, morphine **OR** morphine      Intake/Output Summary (Last 24 hours) at 4/1/2019 1041  Last data filed at 4/1/2019 0555  Gross per 24 hour   Intake 4094 ml   Output 5660 ml   Net -1566 ml       Physical Exam Performed:    /78   Pulse 75   Temp 98.2 °F (36.8 °C) (Oral)   Resp 16   Ht 4' 10\" (1.473 m)   Wt 113 lb (51.3 kg)   SpO2 95%   BMI 23.62 kg/m²     General appearance: No apparent distress, appears stated age and cooperative. HEENT: Pupils equal, round, and reactive to light. Conjunctivae/corneas clear. Neck: Supple, with full range of motion. No jugular venous distention. Trachea midline. Respiratory:  Normal respiratory effort. Clear to auscultation, bilaterally without Rales/Wheezes/Rhonchi.   Chest tube in place  Cardiovascular: Regular rate and rhythm with normal S1/S2 without murmurs, rubs or gallops. Abdomen: Patient with known intra-abdominal postoperative conditions. Generalized tenderness. Musculoskeletal: No clubbing, cyanosis or edema bilaterally. Chronic deformities caused by paraplegia, no acute changes. Skin: Skin color, texture, turgor normal.  No rashes or lesions. Neurologic:  Patient is paraplegic. Lower extremity motor and sensory function is impaired on a chronic basis. No new focal changes. Psychiatric: Alert and oriented, thought content appropriate, normal insight  Capillary Refill: Brisk,< 3 seconds   Peripheral Pulses: +2 palpable, equal bilaterally     I examined the patient today (04/01/19), physical exam is similar to yesterday (3/30)      Labs:   No results for input(s): WBC, HGB, HCT, PLT in the last 72 hours. No results for input(s): NA, K, CL, CO2, BUN, CREATININE, CALCIUM, PHOS in the last 72 hours. Invalid input(s): MAGNES  No results for input(s): AST, ALT, BILIDIR, BILITOT, ALKPHOS in the last 72 hours. No results for input(s): INR in the last 72 hours. No results for input(s): Victorine Chihuahua in the last 72 hours. Urinalysis:      Lab Results   Component Value Date    NITRU Negative 03/27/2019    WBCUA 3-5 03/20/2019    BACTERIA Rare 03/15/2019    RBCUA None seen 03/20/2019    BLOODU Negative 03/27/2019    SPECGRAV 1.010 03/27/2019    GLUCOSEU Negative 03/27/2019       Radiology:  XR CHEST PORTABLE   Final Result   1. No significant change. XR CHEST PORTABLE   Final Result   Small to moderate right-sided pneumothorax with decreased pleural fluid.          CT GUIDED PLEURAL DRAINAGE W CATH PERC   Final Result   Successful CT guided placement of a right chest tube         VL Extremity Venous Bilateral   Final Result      CT CHEST ABDOMEN PELVIS W CONTRAST   Final Result   Addendum 1 of 1   ADDENDUM:   Impression section should include:      Filling defect in the right internal iliac vein on series 3, image 111 is again noted. Findings highly suspicious for thrombus. The findings were sent to the Radiology Results [address] at    12:18   pm on 3/28/2019to be communicated to a licensed caregiver. Final      XR CHEST PORTABLE   Final Result   Interval development of moderate partially loculated right pleural effusion. A the aerated right lung shows opacification compatible with atelectasis   and/or pneumonia. IR PICC WO SQ PORT/PUMP > 5 YEARS   Final Result   Successful placement of PICC line. CT ABDOMEN PELVIS W IV CONTRAST Additional Contrast? None   Final Result   Moderate residual abscess along the anterior hepatic margin, anterior to the   right lateral pigtail drainage catheter. Small amount of loculated fluid along the right pericolic gutter, extending   inferiorly to the pelvis, compatible with additional component of abscess. Moderate right pleural effusion and trace left pleural effusion. Bibasilar   atelectasis. Anasarca. Nonocclusive thrombus versus mixing artifact within right internal iliac vein. Assessment/Plan:    Active Hospital Problems    Diagnosis Date Noted    Hydropneumothorax [J94.8] 03/30/2019    Pneumothorax [J93.9]     Pressure injury of sacral region, unstageable [facility]) [L89.150] 03/28/2019    Loculated pleural effusion [J90]     Abnormal CT scan, chest [R93.89]     Acute deep vein thrombosis (DVT) of non-extremity vein [I82.90]     Anemia [D64.9]     Rupture of operation wound [T81.31XA]     Moderate malnutrition ([address] [E44.0] 03/21/2019    Generalized abdominal pain [R10.84]     Post-operative nausea and vomiting [R11.2, Z98.890] 03/20/2019     PLAN:    Iliac vein thrombosis immobility, paraplegic, post op Block pouch for intra-abdominal abscess. Hx of DVT  - Continue Lovenox while the patient is in-house, proceed with oral Xarelto indefinitely.    - Patient understands the need for compliance  - hem/onc consulted and following  - - Factor 5 Leiden and prothrombin gene sent 3/29- pending   - Check anti-phospholipids - pending          Pleural effusion  Chest tube in place. Continue to monitor. No changes     Intraabdominal abscess post sigmoid perforation  Primary attending to follow and manage. On IV meropenem. Stable      Anemia  No evidence of acute blood loss  Most likely caused by chronic illness. Monitor and transfuse as needed.     Paraplegia  Patient is stable at baseline.         DVT Prophylaxis: Therapeutic anticoagulation as described above  Diet: DIET LOW FIBER;  Code Status: Full Code    PT/OT Eval Status: NA    Dispo - per primary team     Yani Mota, APRN - CNP
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
## 2019-04-01 NOTE — PROGRESS NOTES
[facility] SURGERY    PATIENT NAME: Juliann Cornell     TODAY'S DATE: 4/1/2019    CHIEF COMPLAINT: pain at chest tube site    INTERVAL HISTORY/HPI:    Reports she has most pain at her chest tube site   Eating much better and abd pain improving     REVIEW OF SYSTEMS:  Pertinent positives and negatives as per interval history section    OBJECTIVE:  VITALS:  /78   Pulse 75   Temp 98.2 °F (36.8 °C) (Oral)   Resp 16   Ht 4' 10\" (1.473 m)   Wt 113 lb (51.3 kg)   SpO2 95%   BMI 23.62 kg/m²     INTAKE/OUTPUT:    I/O last 3 completed shifts: In: 3461 [P.O.:2460; I.V.:1634]  Out: 7910 [Urine:5350; Drains:200; Stool:80; Chest Tube:30]  No intake/output data recorded. CONSTITUTIONAL:  awake and alert  LUNGS:  Respirations easy and unlabored, chest tube in place  CARD:  regular rate and rhythm  ABDOMEN:  normal bowel sounds, soft, non-distended, ostomy functional , wound vac in place - to be changed today   Sacral wound: will discuss with wound care after they have seen her today      ASSESSMENT AND PLAN:  35 yo with history of jose rafael's procedure for diverticulitis, perc drains  Continue with diet as tolerated    abd wound: wound vac to be changed today    Pleural effusion: chest tube in place, pulmonary managing    Heme: per heme/onc \"Will need to be on Xarelto (was on this previously and stopped due to non compliance). I told the patient she would need Xarelto LIFELONG to prevent a deadly blood clot. She seemed to understand.  Currently on treatment dose Lovenox- okay to continue and transition to Xarelto on discharge. Will need 15 mg PO BID X 21 days then 20 mg PO daily. \"    Moderate protein and calorie malnutrition: oral supplements    Pressure ulcers - all present on admission - Mid coccyx extending to right buttocks unstageable wound.  Right ischium DTI stage 2 pressure injury.  Left thigh blanchable pressure injury.  Right and left heels stage 1 pressure injury   continue with santyl to sacral wound and local wound care and encourage pt to turn    Dispo: to mike once chest tube removed. Electronically signed by Jameel Cano, SHIRLEY - CNP     [id number]    Patient seen and agree with above. Pain at chest tube site. No nausea. Abdominal pain improved. No fevers.   CXR to follow up  IV abx  Vac change  Alem Gonzales MD

## 2019-04-01 NOTE — PROGRESS NOTES
ONCOLOGY HEMATOLOGY CARE PROGRESS NOTE      SUBJECTIVE:     She complains of severe pain in the AM after waking up. Pain is located to the chest tube site. ROS:   The remaining 10 point review of symptoms is unremarkable. OBJECTIVE        Physical    VITALS:  /78   Pulse 75   Temp 98.2 °F (36.8 °C) (Oral)   Resp 16   Ht 4' 10\" (1.473 m)   Wt 113 lb (51.3 kg)   SpO2 95%   BMI 23.62 kg/m²   TEMPERATURE:  Current - Temp: 98.2 °F (36.8 °C); Max - Temp  Av °F (37.2 °C)  Min: 98.2 °F (36.8 °C)  Max: 99.5 °F (37.5 °C)  PULSE OXIMETRY RANGE: SpO2  Av.6 %  Min: 95 %  Max: 99 %  24HR INTAKE/OUTPUT:      Intake/Output Summary (Last 24 hours) at 2019 0901  Last data filed at 2019 0555  Gross per 24 hour   Intake 4094 ml   Output 5660 ml   Net -1566 ml       CONSTITUTIONAL:  awake, alert, cooperative, no apparent distress, HEENT oral pharynx , no scleral icterus  HEMATOLOGIC/LYMPHATICS:  no cervical lymphadenopathy, no supraclavicular lymphadenopathy, no axillary lymphadenopathy and no inguinal lymphadenopathy  LUNGS:  No increased work of breathing, good air exchange, clear to auscultation bilaterally, no crackles or wheezing, R chest tube with serous output   CARDIOVASCULAR:  , regular rate and rhythm, normal S1 and S2, no S3 or S4, and no murmur noted  ABDOMEN:  No scars, normal bowel sounds, soft, non-distended, non-tender, no masses palpated, no hepatosplenomegally, + OSTOMY   MUSCULOSKELETAL:  There is no redness, warmth, or swelling of the joints. EXTREMETIES: No clubbing cynosis or edema, flaccid with foot drop bilaterally   NEUROLOGIC:  Awake, alert, oriented to name, place and time. Paraplegic   SKIN:  no bruising or bleeding      Data    No results for input(s): WBC, HGB, HCT, PLT, MCV in the last 72 hours. No results for input(s): NA, K, CL, CO2, PHOS, BUN, CREATININE in the last 72 hours.     Invalid input(s): CA  No results for input(s):

## 2019-04-01 NOTE — PROGRESS NOTES
Physical Therapy  Pt declined PT today d/t increased back pain. Educated pt on purpose of trying to do what she can, such as LE therapeutic exercises, but pt continued to refuse. Will continue to follow.     Melani Mcbride PTA

## 2019-04-01 NOTE — PROGRESS NOTES
[facility] Wound Ostomy Continence Nurse  Follow-up Progress Note       NAME:  Adenike Daly  MEDICAL RECORD NUMBER:  [id number]  AGE:  34 y.o. GENDER:  female  :  1990  TODAY'S DATE:  2019    Subjective:  Nothing new today. Wound Identification:  Wound Type: Dehisced distal mid abdominal incision line. Small wounds on abdomin now pink or red (were scabs) from stay suture sites on -  and healing well.  Mid coccyx extending to right buttocks unstageable wound (not witnessed today).  Right ischium DTI stage 2 pressure injury (Not witnessed today).  Left thigh blanchable pressure injury (not witnessed today).  Right and left heels stage 1 pressure injury (Did not witness these wounds today). Right lateral chest tube now. Patient Goal of Care:  [x] Wound Healing  [] Odor Control   [] Palliative Care  [] Pain Control   [] Other:     Objective:  Lying on side in bed. /78   Pulse 75   Temp 98.2 °F (36.8 °C) (Oral)   Resp 16   Ht 4' 10\" (1.473 m)   Wt 113 lb (51.3 kg)   SpO2 95%   BMI 23.62 kg/m²   Tom Risk Score: Tom Scale Score: 12  Assessment:  Abdominal wound bed shaped like a [place] tree today. Smaller measurements. Only 0.5 cm deep today. No tunneling. See photo. Measurements:  Negative Pressure Wound Therapy Abdomen Mid (Active)   $ Standard NPWT <=50 sq cm PER TX $ Yes 2019 12:00 PM   Wound Type Surgical 2019 12:00 PM   Unit Type [facility] owned 2019 12:00 PM   Dressing Type Black foam 2019 12:00 PM   Number of pieces used 1 2019 12:00 PM   Cycle Continuous 2019 12:00 PM   Target Pressure (mmHg) 125 2019 12:00 PM   Intensity 1 2019 12:00 PM   Canister changed?  Yes 2019 12:00 PM   Dressing Status Changed 2019 12:00 PM   Dressing Changed Changed/New 2019 12:00 PM   Drainage Amount Scant 2019 12:00 PM   Drainage Description Serosanguinous 2019 12:00 PM   Dressing Change Due 04/03/19 4/1/2019 12:00 PM   Output (ml) 0 ml 4/1/2019  5:55 AM   Wound Assessment Red;Granulation tissue 4/1/2019 12:00 PM   Ester-wound Assessment Clean;Dry; Intact 4/1/2019 12:00 PM   Shape Shaped like a [place] tree 4/1/2019 12:00 PM   Odor None 4/1/2019 12:00 PM   Number of days: 7       Wound 03/06/19 Ischium Right  ischium - evolved to stage 2 (Active)   Wound Image   3/27/2019 10:06 AM   Wound Pressure Stage  2 3/31/2019  7:34 PM   Dressing Status Clean;Dry; Intact 3/31/2019  7:34 PM   Dressing Changed Changed/New 3/31/2019 11:30 AM   Dressing/Treatment Foam 3/31/2019  7:34 PM   Wound Cleansed Rinsed/Irrigated with saline 3/28/2019 10:45 PM   Dressing Change Due 04/03/19 3/31/2019 11:30 AM   Wound Length (cm) 2.5 cm 3/27/2019 10:06 AM   Wound Width (cm) 3 cm 3/27/2019 10:06 AM   Wound Depth (cm) 0 cm 3/27/2019 10:06 AM   Wound Surface Area (cm^2) 7.5 cm^2 3/27/2019 10:06 AM   Change in Wound Size % (l*w) -4900 3/27/2019 10:06 AM   Wound Volume (cm^3) 0 cm^3 3/27/2019 10:06 AM   Distance Tunneling (cm) 0 cm 3/27/2019 10:06 AM   Tunneling Position ___ O'Clock 0 3/27/2019 10:06 AM   Undermining Starts ___ O'Clock 0 3/27/2019 10:06 AM   Undermining Ends___ O'Clock 0 3/27/2019 10:06 AM   Undermining Maxium Distance (cm) 0 3/27/2019 10:06 AM   Wound Assessment Clean;Dry; Intact 3/31/2019 11:30 AM   Drainage Amount None 3/31/2019 11:30 AM   Drainage Description Clear 3/29/2019  8:00 AM   Odor None 3/29/2019  9:12 PM   Margins Attached edges; Defined edges 3/29/2019  9:12 PM   Ester-wound Assessment Clean;Dry; Intact 3/31/2019 11:30 AM   Non-staged Wound Description Partial thickness 3/29/2019  8:00 AM   [place]%Wound Bed 100 3/27/2019 10:06 AM   Red%Wound Bed 60 3/25/2019 11:35 AM   Purple%Wound Bed 0 3/25/2019 11:35 AM   Culture Taken No 3/27/2019 10:06 AM   Number of days: 25       Wound 03/09/19 Coccyx Right mid coccyx extending to right  buttocks (Active)   Wound Image   3/27/2019 10:06 AM   Wound Pressure Unstageable 3/31/2019  7:34 PM   Dressing Status Clean;Dry; Intact 3/31/2019  7:34 PM   Dressing Changed Changed/New 3/31/2019 11:30 AM   Dressing/Treatment Moisture barrier; Pharmaceutical agent (see MAR); Moist to moist;Dry Dressing;ABD; Medipore 3/31/2019 11:30 AM   Wound Cleansed Rinsed/Irrigated with saline 3/31/2019 11:30 AM   Dressing Change Due 04/01/19 3/31/2019 11:30 AM   Wound Length (cm) 6 cm 3/27/2019 10:06 AM   Wound Width (cm) 7.5 cm 3/27/2019 10:06 AM   Wound Depth (cm) 0.1 cm 3/27/2019 10:06 AM   Wound Surface Area (cm^2) 45 cm^2 3/27/2019 10:06 AM   Change in Wound Size % (l*w) -40.62 3/27/2019 10:06 AM   Wound Volume (cm^3) 4.5 cm^3 3/27/2019 10:06 AM   Wound Healing % -41 3/27/2019 10:06 AM   Distance Tunneling (cm) 0 cm 3/27/2019 10:06 AM   Tunneling Position ___ O'Clock 0 3/27/2019 10:06 AM   Undermining Starts ___ O'Clock 0 3/27/2019 10:06 AM   Undermining Ends___ O'Clock 0 3/27/2019 10:06 AM   Undermining Maxium Distance (cm) 0 3/27/2019 10:06 AM   Wound Assessment Black;Red;Yellow 3/31/2019 11:30 AM   Drainage Amount Small 3/31/2019 11:30 AM   Drainage Description Purulent 3/31/2019 11:30 AM   Odor None 3/30/2019  8:23 PM   Margins Unattached edges; Defined edges 3/27/2019 10:06 AM   Ester-wound Assessment Blanchable erythema 3/31/2019 11:30 AM   Non-staged Wound Description Partial thickness 3/29/2019  8:00 AM   Red%Wound Bed 15 3/31/2019 11:30 AM   Yellow%Wound Bed 5 3/31/2019 11:30 AM   Black%Wound Bed 80 3/31/2019 11:30 AM   Purple%Wound Bed 60 3/25/2019  9:50 AM   Culture Taken No 3/27/2019 10:06 AM   Number of days: 22       Wound 03/21/19 Heel Right (Active)   Wound Image   4/1/2019 12:00 PM   Wound Pressure Stage  1 3/31/2019  7:34 PM   Dressing Status Clean;Dry; Intact 3/31/2019  7:34 PM   Dressing Changed Changed/New 3/31/2019 11:30 AM   Dressing/Treatment Foam 3/31/2019  7:34 PM   Wound Cleansed Not Cleansed 3/28/2019 10:45 PM   Dressing Change Due 04/03/19 3/31/2019 11:30 AM   Wound Length (cm) 1 cm 3/27/2019 10:06 AM   Wound Width (cm) 1 cm 3/27/2019 10:06 AM   Wound Depth (cm) 0 cm 3/27/2019 10:06 AM   Wound Surface Area (cm^2) 1 cm^2 3/27/2019 10:06 AM   Change in Wound Size % (l*w) 50 3/27/2019 10:06 AM   Wound Volume (cm^3) 0 cm^3 3/27/2019 10:06 AM   Distance Tunneling (cm) 0 cm 3/27/2019 10:06 AM   Tunneling Position ___ O'Clock 0 3/27/2019 10:06 AM   Undermining Starts ___ O'Clock 0 3/27/2019 10:06 AM   Undermining Ends___ O'Clock 0 3/27/2019 10:06 AM   Undermining Maxium Distance (cm) 0 3/27/2019 10:06 AM   Wound Assessment Clean;Dry; Intact 3/31/2019 11:30 AM   Drainage Amount None 3/31/2019 11:30 AM   Odor None 3/30/2019  8:23 PM   Margins Attached edges; Defined edges 3/29/2019  9:12 PM   Ester-wound Assessment Clean;Dry; Intact 3/31/2019  7:34 PM   [place]%Wound Bed 100 3/25/2019  9:50 AM   Red%Wound Bed 100 3/27/2019 10:06 AM   Culture Taken No 3/27/2019 10:06 AM   Number of days: 11       Wound 03/20/19 Heel Left;Posterior Stage 1 Left heel (Active)   Wound Image   3/27/2019 10:06 AM   Wound Pressure Stage  1 3/31/2019  7:34 PM   Dressing Status Clean;Dry; Intact 3/31/2019  7:34 PM   Dressing Changed Changed/New 3/31/2019 11:30 AM   Dressing/Treatment Foam 3/31/2019  7:34 PM   Wound Cleansed Not Cleansed 3/28/2019 10:45 PM   Dressing Change Due 04/03/19 3/31/2019 11:30 AM   Wound Length (cm) 2 cm 3/27/2019 10:06 AM   Wound Width (cm) 1 cm 3/27/2019 10:06 AM   Wound Depth (cm) 0 cm 3/27/2019 10:06 AM   Wound Surface Area (cm^2) 2 cm^2 3/27/2019 10:06 AM   Change in Wound Size % (l*w) 50 3/27/2019 10:06 AM   Wound Volume (cm^3) 0 cm^3 3/27/2019 10:06 AM   Distance Tunneling (cm) 0 cm 3/27/2019 10:06 AM   Tunneling Position ___ O'Clock 0 3/27/2019 10:06 AM   Undermining Starts ___ O'Clock 0 3/27/2019 10:06 AM   Undermining Ends___ O'Clock 0 3/27/2019 10:06 AM   Undermining Maxium Distance (cm) 0 3/27/2019 10:06 AM   Wound Assessment Clean;Dry; Intact 3/31/2019 11:30 AM   Drainage Amount None 3/31/2019 11:30 AM   Odor None 3/30/2019  8:23 PM   Margins Attached edges; Defined edges 3/29/2019  9:12 PM   Ester-wound Assessment Clean;Dry; Intact 3/31/2019 11:30 AM   [place]%Wound Bed 100 3/25/2019  9:50 AM   Red%Wound Bed 100 3/27/2019 10:06 AM   Culture Taken No 3/27/2019 10:06 AM   Number of days: 12       Wound 03/20/19 Hip Left;Lateral Left Trochanter Stage 1 (Active)   Wound Image   3/21/2019 12:15 PM   Wound Pressure Stage  1 3/31/2019  7:34 PM   Dressing Status Clean;Dry; Intact 3/31/2019  7:34 PM   Dressing Changed Changed/New 3/31/2019 11:30 AM   Dressing/Treatment Foam 3/31/2019  7:34 PM   Wound Cleansed Not Cleansed 3/26/2019 10:40 PM   Dressing Change Due 04/03/19 3/31/2019 11:30 AM   Wound Length (cm) 2.5 cm 3/27/2019 10:06 AM   Wound Width (cm) 2.5 cm 3/27/2019 10:06 AM   Wound Depth (cm) 0 cm 3/27/2019 10:06 AM   Wound Surface Area (cm^2) 6.25 cm^2 3/27/2019 10:06 AM   Change in Wound Size % (l*w) 30.56 3/27/2019 10:06 AM   Wound Volume (cm^3) 0 cm^3 3/27/2019 10:06 AM   Distance Tunneling (cm) 0 cm 3/27/2019 10:06 AM   Tunneling Position ___ O'Clock 0 3/27/2019 10:06 AM   Undermining Starts ___ O'Clock 0 3/27/2019 10:06 AM   Undermining Ends___ O'Clock 0 3/27/2019 10:06 AM   Undermining Maxium Distance (cm) 0 3/27/2019 10:06 AM   Wound Assessment Clean;Dry; Intact 3/31/2019 11:30 AM   Drainage Amount None 3/31/2019 11:30 AM   Odor None 3/30/2019  8:23 PM   Margins Attached edges; Defined edges 3/29/2019  9:12 PM   Ester-wound Assessment Clean;Dry; Intact 3/31/2019 11:30 AM   Non-staged Wound Description Not applicable 1/81/2791 78:62 AM   Red%Wound Bed 100 3/27/2019 10:06 AM   Culture Taken No 3/27/2019 10:06 AM   Number of days: 12     Incision 03/05/19 Abdomen Mid;Distal (Active)   Wound Image   4/1/2019 12:00 PM   Wound Assessment Red;Granulation tissue 4/1/2019 12:00 PM   Ester-wound Assessment Clean;Dry; Intact 4/1/2019 12:00 PM   Wound Length (cm) 4.5 cm 4/1/2019 12:00 PM   Wound Width (cm) 3.5 cm 4/1/2019 12:00 PM   Wound Depth (cm) 0.5 cm 4/1/2019 12:00 PM   Wound Volume (cm^3) 7.88 cm^3 4/1/2019 12:00 PM   Wound Healing % 88 4/1/2019 12:00 PM   Closure None 4/1/2019 12:00 PM   Drainage Amount Scant 4/1/2019 12:00 PM   Drainage Description Serosanguinous 4/1/2019 12:00 PM   Odor None 4/1/2019 12:00 PM   Dressing/Treatment Hydrocolloid;Barrier Film;Vacuum dressing 4/1/2019 12:00 PM   Dressing Changed Changed/New 4/1/2019 12:00 PM   Dressing Status Changed 4/1/2019 12:00 PM   Dressing Change Due 04/03/19 4/1/2019 12:00 PM   Number of days: 26     Distal edge of mid abd incision line:          Response to treatment:  Well tolerated by patient. Pain Assessment:  Severity:  0 / 10  Quality of pain: N/A  Wound Pain Timing/Severity: none  Premedicated: No  Plan:   Plan of Care: [REMOVED] Wound 03/09/19 Sacrum-Dressing/Treatment: Pharmaceutical agent (see MAR), Moist to moist, Dry Dressing, Medipore  Wound 03/06/19 Ischium Right  ischium - evolved to stage 2-Dressing/Treatment: Foam  Wound 03/09/19 Coccyx Right mid coccyx extending to right  buttocks-Dressing/Treatment: Moisture barrier, Pharmaceutical agent (see MAR), Moist to moist, Dry Dressing, ABD, Medipore  Wound 03/21/19 Heel Right-Dressing/Treatment: Foam  [REMOVED] Incision 03/01/19 Abdomen Medial;Mid-Dressing/Treatment: Open to air  Incision 03/05/19 Abdomen Mid;Distal-Dressing/Treatment: Hydrocolloid, Barrier Film, Vacuum dressing  Wound 03/20/19 Heel Left;Posterior Stage 1 Left heel-Dressing/Treatment: Foam  Wound 03/20/19 Hip Left;Lateral Left Trochanter Stage 1-Dressing/Treatment: Foam     No plans for DC today. C xray just done to F/U on pleural effusion. Negative pressure wound therapy (NPWT) dressing changed today.  dsg removed.  Cleansed wound with NSS.  Hydrocolloid and barrier film applied to open stay suture exit sited and around open dehisced incision.  1 black sponged applied into wound bed  Covered with VAC drape.  Connected to 125 mmHg continuous low suction.  Plan to change M-W-F.  Wound care to follow.      Instructed bed side RN Kath Murguia) to change other dressings and assist patient with colostomy care. HALLIE dressing needs to be changed today. Specialty Bed Required : Yes   [] Low Air Loss   [] Pressure Redistribution  [x] Fluid Immersion dolphin mattress  [] Bariatric  [] Total Pressure Relief  [] Other:     Current Diet: DIET LOW FIBER; Dietician consult:  Yes    Discharge Plan:  Placement for patient upon discharge: intermediate care facility   Patient appropriate for Outpatient [address]: Yes    Referrals:  []  active  [] [address] [employer]  [] Supplies  [] Other    Patient/Caregiver Teaching: Updated on incision healing and improvement.   Level of patient/caregiver understanding able to:   [x] Indicates understanding       [] Needs reinforcement  [] Unsuccessful      [] Verbal Understanding  [] Demonstrated understanding       [] No evidence of learning  [] Refused teaching         [] N/A       Electronically signed by Danny Nix RN, MSN, Alfonza Crigler on 4/1/2019 at 12:49 PM

## 2019-04-01 NOTE — PROGRESS NOTES
Nutrition Assessment    Type and Reason for Visit: Reassess    Nutrition Recommendations:   · Continue low fiber diet  · Encourage po intakes - pt declined any ONS  · Monitor po intakes, nutrition adequacy, weights, pertinent labs, BMs, wound healing    Nutrition Assessment: Follow up: Pt improving from a nutritional standpoint AEB pt report of appetite improving. Po intakes % per EMR. Pt reports that she has been able to eat at least 50% of her meals generally. Tolerating diet, no c/o N/V. Pt remains compromised d/t increased nutrition needs from multiple wounds. Discussed starting ONS, pt declined and reports that she does not like any ONS. Encouraged po intakes. Will continue current diet. Malnutrition Assessment:  · Malnutrition Status: Meets the criteria for moderate malnutrition  · Context: Acute illness or injury  · Findings of the 6 clinical characteristics of malnutrition (Minimum of 2 out of 6 clinical characteristics is required to make the diagnosis of moderate or severe Protein Calorie Malnutrition based on AND/ASPEN Guidelines):  1. Energy Intake-Less than or equal to 75% of estimated energy requirement, Greater than or equal to 7 days    2. Weight Loss-No significant weight loss,    3. Fat Loss-Mild subcutaneous fat loss, Orbital  4. Muscle Loss-Moderate muscle mass loss, Temples (temporalis muscle), Clavicles (pectoralis and deltoids)  5. Fluid Accumulation-Unable to assess,    6.  Strength-Not measured    Nutrition Risk Level:  Moderate    Nutrient Needs:  · Estimated Daily Total Kcal: 9340-7775(28-30 kcal/kg)  · Estimated Daily Protein (g): 61-77  · Estimated Daily Total Fluid (ml/day): 1 ml/kcal or per MD    Nutrition Diagnosis:   · Problem: Increased nutrient needs(protein)  · Etiology: related to Increased demand for energy/nutrients     Signs and symptoms:  as evidenced by Presence of wounds    Objective Information:  · Nutrition-Focused Physical Findings: Trace BLE edema  · Wound Type: Multiple, Pressure Ulcer, Stage II, Stage III(Sacrum, R heel, Buttock)  · Current Nutrition Therapies:  · Oral Diet Orders: Low Fiber   · Oral Diet intake: 26-50%, 51-75%, %  · Oral Nutrition Supplement (ONS) Orders: None  · ONS intake: Refused, Unable to assess  · Anthropometric Measures:  · Ht: 4' 10\" (147.3 cm)   · Current Body Wt: 113 lb (51.3 kg)(stated)  · % Weight Change:  ,  No weight change per EMR  · Ideal Body Wt: 96 lb (43.5 kg),  · BMI Classification: BMI 18.5 - 24.9 Normal Weight    Nutrition Interventions:   Continue current diet  Continued Inpatient Monitoring    Nutrition Evaluation:   · Evaluation: Progressing toward goals   · Goals: Pt will advance to PO diet with intakes of 50% or greater and no s/s of N/V    · Monitoring: Nutrition Progression, Meal Intake, Pertinent Labs      Electronically signed by Kirsten Chu RD, LD on 4/1/19 at 12:42 PM    Contact Number: Office: [phone]; [address]: [phone]

## 2019-04-02 ENCOUNTER — APPOINTMENT (OUTPATIENT)
Dept: CT IMAGING | Age: 29
DRG: 862 | End: 2019-04-02
Payer: MEDICARE

## 2019-04-02 LAB
FACTOR V LEIDEN: NEGATIVE
PROTHROMBIN G20210A MUTATION: NEGATIVE
PT PCR SPECIMEN: NORMAL
SOLUBLE TRANSFERRIN RECEPT: 2.6 MG/L (ref 1.9–4.4)
SPECIMEN: NORMAL

## 2019-04-02 PROCEDURE — 6370000000 HC RX 637 (ALT 250 FOR IP): Performed by: SURGERY

## 2019-04-02 PROCEDURE — 6360000002 HC RX W HCPCS: Performed by: INTERNAL MEDICINE

## 2019-04-02 PROCEDURE — 99024 POSTOP FOLLOW-UP VISIT: CPT | Performed by: SURGERY

## 2019-04-02 PROCEDURE — 2500000003 HC RX 250 WO HCPCS: Performed by: SURGERY

## 2019-04-02 PROCEDURE — 32561 LYSE CHEST FIBRIN INIT DAY: CPT | Performed by: NURSE PRACTITIONER

## 2019-04-02 PROCEDURE — 6360000002 HC RX W HCPCS: Performed by: NURSE PRACTITIONER

## 2019-04-02 PROCEDURE — 6360000002 HC RX W HCPCS: Performed by: SURGERY

## 2019-04-02 PROCEDURE — 2580000003 HC RX 258: Performed by: SURGERY

## 2019-04-02 PROCEDURE — 6370000000 HC RX 637 (ALT 250 FOR IP): Performed by: INTERNAL MEDICINE

## 2019-04-02 PROCEDURE — APPSS30 APP SPLIT SHARED TIME 16-30 MINUTES: Performed by: CLINICAL NURSE SPECIALIST

## 2019-04-02 PROCEDURE — 97110 THERAPEUTIC EXERCISES: CPT

## 2019-04-02 PROCEDURE — 99232 SBSQ HOSP IP/OBS MODERATE 35: CPT | Performed by: INTERNAL MEDICINE

## 2019-04-02 PROCEDURE — 1200000000 HC SEMI PRIVATE

## 2019-04-02 PROCEDURE — 6370000000 HC RX 637 (ALT 250 FOR IP): Performed by: OBSTETRICS & GYNECOLOGY

## 2019-04-02 PROCEDURE — 2580000003 HC RX 258: Performed by: NURSE PRACTITIONER

## 2019-04-02 PROCEDURE — 6370000000 HC RX 637 (ALT 250 FOR IP): Performed by: NURSE PRACTITIONER

## 2019-04-02 PROCEDURE — 71250 CT THORAX DX C-: CPT

## 2019-04-02 PROCEDURE — 6370000000 HC RX 637 (ALT 250 FOR IP): Performed by: CLINICAL NURSE SPECIALIST

## 2019-04-02 PROCEDURE — APPNB30 APP NON BILLABLE TIME 0-30 MINS: Performed by: CLINICAL NURSE SPECIALIST

## 2019-04-02 RX ADMIN — WATER 5 MG: 1 INJECTION INTRAMUSCULAR; INTRAVENOUS; SUBCUTANEOUS at 12:47

## 2019-04-02 RX ADMIN — OXYCODONE HYDROCHLORIDE 10 MG: 5 TABLET ORAL at 15:33

## 2019-04-02 RX ADMIN — OXYCODONE HYDROCHLORIDE 10 MG: 5 TABLET ORAL at 09:13

## 2019-04-02 RX ADMIN — COLLAGENASE SANTYL: 250 OINTMENT TOPICAL at 09:14

## 2019-04-02 RX ADMIN — MORPHINE SULFATE 4 MG: 4 INJECTION INTRAVENOUS at 07:10

## 2019-04-02 RX ADMIN — FOLIC ACID 1 MG: 1 TABLET ORAL at 09:13

## 2019-04-02 RX ADMIN — OXYCODONE HYDROCHLORIDE 10 MG: 5 TABLET ORAL at 21:50

## 2019-04-02 RX ADMIN — MORPHINE SULFATE 4 MG: 4 INJECTION INTRAVENOUS at 18:45

## 2019-04-02 RX ADMIN — CLOTRIMAZOLE: 10 CREAM TOPICAL at 09:13

## 2019-04-02 RX ADMIN — MEROPENEM 1 G: 1 INJECTION, POWDER, FOR SOLUTION INTRAVENOUS at 06:33

## 2019-04-02 RX ADMIN — Medication 10 ML: at 09:13

## 2019-04-02 RX ADMIN — ALTEPLASE 10 MG: 2.2 INJECTION, POWDER, LYOPHILIZED, FOR SOLUTION INTRAVENOUS at 12:47

## 2019-04-02 RX ADMIN — MEROPENEM 1 G: 1 INJECTION, POWDER, FOR SOLUTION INTRAVENOUS at 23:00

## 2019-04-02 RX ADMIN — MEROPENEM 1 G: 1 INJECTION, POWDER, FOR SOLUTION INTRAVENOUS at 15:33

## 2019-04-02 RX ADMIN — MORPHINE SULFATE 4 MG: 4 INJECTION INTRAVENOUS at 23:00

## 2019-04-02 RX ADMIN — FAMOTIDINE 20 MG: 10 INJECTION, SOLUTION INTRAVENOUS at 20:31

## 2019-04-02 RX ADMIN — POTASSIUM CHLORIDE 10 MEQ: 750 TABLET, EXTENDED RELEASE ORAL at 09:13

## 2019-04-02 RX ADMIN — POLYETHYLENE GLYCOL 3350 17 G: 17 POWDER, FOR SOLUTION ORAL at 09:21

## 2019-04-02 RX ADMIN — CLOTRIMAZOLE: 10 CREAM TOPICAL at 20:30

## 2019-04-02 RX ADMIN — MORPHINE SULFATE 4 MG: 4 INJECTION INTRAVENOUS at 05:07

## 2019-04-02 RX ADMIN — FLUCONAZOLE 100 MG: 100 TABLET ORAL at 09:13

## 2019-04-02 RX ADMIN — POTASSIUM CHLORIDE 10 MEQ: 750 TABLET, EXTENDED RELEASE ORAL at 20:31

## 2019-04-02 RX ADMIN — ENOXAPARIN SODIUM 50 MG: 60 INJECTION SUBCUTANEOUS at 20:31

## 2019-04-02 RX ADMIN — Medication 10 ML: at 20:31

## 2019-04-02 RX ADMIN — OXYCODONE HYDROCHLORIDE 10 MG: 5 TABLET ORAL at 03:06

## 2019-04-02 RX ADMIN — ENOXAPARIN SODIUM 50 MG: 60 INJECTION SUBCUTANEOUS at 09:13

## 2019-04-02 RX ADMIN — MORPHINE SULFATE 4 MG: 4 INJECTION INTRAVENOUS at 12:05

## 2019-04-02 RX ADMIN — FAMOTIDINE 20 MG: 10 INJECTION, SOLUTION INTRAVENOUS at 09:13

## 2019-04-02 ASSESSMENT — PAIN SCALES - GENERAL
PAINLEVEL_OUTOF10: 8
PAINLEVEL_OUTOF10: 9
PAINLEVEL_OUTOF10: 8
PAINLEVEL_OUTOF10: 8
PAINLEVEL_OUTOF10: 10
PAINLEVEL_OUTOF10: 9
PAINLEVEL_OUTOF10: 8
PAINLEVEL_OUTOF10: 9

## 2019-04-02 ASSESSMENT — PAIN DESCRIPTION - LOCATION
LOCATION: BACK;CHEST
LOCATION: BACK;ABDOMEN
LOCATION: BACK

## 2019-04-02 ASSESSMENT — PAIN DESCRIPTION - PAIN TYPE
TYPE: ACUTE PAIN
TYPE: ACUTE PAIN
TYPE: ACUTE PAIN;CHRONIC PAIN
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
## 2019-04-02 NOTE — PROGRESS NOTES
noted in [facility]. Cardiovascular: Regular rate and rhythm with normal S1/S2 without rubs or gallops. 3/6 systolic murmur. Abdomen: Soft, non-tender. VAC dsg noted to lower abdomen. +ostomy with stool in bag. Musculoskeletal: No clubbing, cyanosis or edema bilaterally. Chronic deformities caused by paraplegia, no acute changes. Skin: Skin color, texture, turgor normal.  No rashes or lesions. Neurologic:  Patient is paraplegic. Lower extremity motor and sensory function is impaired on a chronic basis. No new focal changes. Psychiatric: Alert and oriented, thought content appropriate, normal insight  Capillary Refill: Brisk,< 3 seconds   Peripheral Pulses: +2 palpable, equal bilaterally       Labs:   No results for input(s): WBC, HGB, HCT, PLT in the last 72 hours. No results for input(s): NA, K, CL, CO2, BUN, CREATININE, CALCIUM, PHOS in the last 72 hours. Invalid input(s): MAGNES  No results for input(s): AST, ALT, BILIDIR, BILITOT, ALKPHOS in the last 72 hours. No results for input(s): INR in the last 72 hours. No results for input(s): [place] in the last 72 hours. Urinalysis:      Lab Results   Component Value Date    NITRU Negative 03/27/2019    WBCUA 3-5 03/20/2019    BACTERIA Rare 03/15/2019    RBCUA None seen 03/20/2019    BLOODU Negative 03/27/2019    SPECGRAV 1.010 03/27/2019    GLUCOSEU Negative 03/27/2019       Radiology:  CT CHEST WO CONTRAST   Final Result   Decreased apparent multiloculated right pleural effusion status post pleural   catheter placement. The pleural catheter appears to been retracted several   cm. Careful attention is necessary to prevent further extraction. Small areas of nondependent air are seen as described but without significant   affect. Compressive collapse right lower lobe. The right upper subphrenic trans pleural abscess drainage catheter is no   longer surrounded by remaining fluid.       RECOMMENDATIONS:   Consider removal of the Nonocclusive thrombus versus mixing artifact within right internal iliac vein. Assessment/Plan:    Active Hospital Problems    Diagnosis Date Noted    Hydropneumothorax [J94.8] 03/30/2019    Pneumothorax [J93.9]     Pressure injury of sacral region, unstageable [facility]) [L89.150] 03/28/2019    Loculated pleural effusion [J90]     Abnormal CT scan, chest [R93.89]     Acute deep vein thrombosis (DVT) of non-extremity vein [I82.90]     Anemia [D64.9]     Rupture of operation wound [T81.31XA]     Moderate malnutrition ([address] [E44.0] 03/21/2019    Generalized abdominal pain [R10.84]     Post-operative nausea and vomiting [R11.2, Z98.890] 03/20/2019     PLAN:    Iliac vein thrombosis immobility, paraplegic, post op Block pouch for intra-abdominal abscess. Hx of DVT  - Continue Lovenox while the patient is in-house, proceed with oral Xarelto indefinitely. - Patient understands the need for compliance and will need [employer] AC  - hem/onc consulted and following  - Factor 5 Leiden and prothrombin gene negative  - anti-phospholipids negative. - hematology consulted.     Pleural effusion, right, loculated   - Chest tube in place.    - CT chest pending - poss dc chest tube 4/2.     Intraabdominal abscess post sigmoid perforation  - IV meropenem (s 3/25)     Anemia  - No evidence of acute blood loss  - Most likely caused by chronic illness. - follow CBC.     Paraplegia  Patient is stable at baseline.     Abdominal wound   - wound VAC in place.  - wound care RN following    DVT Prophylaxis: Therapeutic anticoagulation   Diet: DIET LOW FIBER;  Code Status: Full Code    PT/OT Eval Status: NA    Dispo - per primary team     Rosalba Staff, APRN - CNP

## 2019-04-02 NOTE — PROGRESS NOTES
Physical Therapy  Attempted to see pt for PT. Per nursing pt \" having lines pulled by physicians\" and unable to be seen for PT at present time.  Will re-attempt 4/3/2019  Franchesca Landin P.T. [id number]

## 2019-04-02 NOTE — PLAN OF CARE
Pt will remain free from falls. Pt is a high fall risk. Call light within reach. Bed side table within reach. Wheels locked. Bed in lowest position. Bed check in place. Pt instructed to call out for assistance. Pt expressesed understanding & calls out appropritately. All care per orders. Will continue to monitor.

## 2019-04-02 NOTE — PROGRESS NOTES
Pt is alert and oriented. VSS. RA. Pt c/o 7/10 pain. Pt give scheduled pain medication at this time. BS active x4. Scant amount of output noted in colostomy at this time. No drainage noted in HALLIE drains. No output noted from right chest tube. Shift assessment completed and documented. Call light within reach. Bed side table within reach. Wheels locked. Bed in lowest position. Bed check in place. Pt instructed to call out for assistance. Pt expressesed understanding & calls out appropritately. All care per orders. Will continue to monitor.  Electronically signed by Keegan Ojeda RN on 4/2/2019 at 1:40 AM

## 2019-04-02 NOTE — PROGRESS NOTES
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 effusion with associated consolidation as well. Soft Tissues/Bones: No suspicious osseous lesions. Remote left-sided rib fractures. Abdomen/Pelvis: Organs: Liver, gallbladder, adrenals, spleen and pancreas are normal.  No hydronephrosis. GI/Bowel: No evidence of obstruction. Postsurgical changes of left upper quadrant ostomy and rectal stump. Pelvis: Vines catheter within the bladder. Reproductive organs are unremarkable. Peritoneum/Retroperitoneum: No evidence of AAA or lymphadenopathy. Right lateral approach drainage catheter remains in place lateral to the right hepatic lobe. More inferior drainage catheter between the liver and right kidney is unchanged. Decreased size of the fluid collection adjacent to the hepatic dome now measuring 4.6 x 1.3 cm, previously 7.1 x 2.3 cm. Previously noted fluid collection along the right pericolic gutter has nearly resolved. This is best visualized on series 3, image 106.  There is a rim enhancing structure in right lower abdomen between bowel loops best visualized on series 3, image 102 which has been present on several prior studies. No oral contrast noted within the collection. No definite evidence of oral contrast extravasation. This is also visualized on coronal series 605, image 49. Bones/Soft Tissues: Moderate diffuse anasarca. No suspicious osseous lesions. Nerve stimulator generator pack again noted in the left abdomen. Bilateral effusions, larger on the right. There is associated airspace disease and scattered ground-glass opacities which could be infectious/inflammatory. Consider short interval follow-up chest CT in 3 months after treatment. Lateral approach drainage catheters in place with decreased size of the perihepatic and right paracolic gutter collections. 3.8 cm fluid containing structure in the right abdomen which could represent a loop of bowel. However, this has been present on several prior studies and does not contain any oral contrast on the current study which raises the possibility of interloop abscess. Ct Guided Pleural Drainage W Cath Perc    Result Date: 3/29/2019  PROCEDURE: CT GUIDED CHEST TUBE PLACEMENT MODERATE CONSCIOUS SEDATION <Completed Date> HISTORY: ORDERING SYSTEM PROVIDED HISTORY: Loculated effusion, fever. Please place pigtail to suction, send studies ordered. Thank you TECHNOLOGIST PROVIDED HISTORY: Reason for exam:->Loculated effusion, fever. Please place pigtail to suction, send studies ordered. Thank you Ordering Physician Provided Reason for Exam: right sided chest tube placement Pneumothorax SEDATION: Moderate conscious sedation was administered for 10 minutes and monitored radiologist and nurse. TECHNIQUE: Informed consent was obtained after a detailed explanation of the procedure including risks, benefits, and alternatives. Universal protocol was followed. The patient was placed on the CT table left position.   A suitable skin site posteriorly on the right was prepped and draped in sterile fashion following CT localization. An 18 gauge needle was advanced into the right pleural space and a 0.035 guidewire was used to place a 10 Serbian chest tube after the fascial tract was dilated. The catheter was sutured to the skin and the patient tolerated the procedure well. The catheter was attached to Pleurevac drainage. Dose modulation, iterative reconstruction, and/or weight based adjustment of the mA/kV was utilized to reduce the radiation dose to as low as reasonably achievable. FINDINGS: Post procedure images demonstrate the chest tube in good position     Successful CT guided placement of a right chest tube     Ct Drainage Visceral Percutaneous    Result Date: 3/8/2019  PROCEDURE: CT GUIDED ABDOMINAL ABSCESS DRAINAGE CATHETER PLACEMENT x2 MODERATE CONSCIOUS SEDATION 3/8/2019 HISTORY: ORDERING SYSTEM PROVIDED HISTORY: postop colectomy, intraabdominal abscesses TECHNOLOGIST PROVIDED HISTORY: Reason for exam:->postop colectomy, intraabdominal abscesses Ordering Physician Provided Reason for Exam: subphrenic abscess-drain placement x 2 SEDATION: Versed 2 mg and Fentanyl 100 mcg were titrated intravenously for moderate sedation monitored under my direction. Total intraservice time of sedation was 47 minutes. The patient's vital signs were monitored throughout the procedure and recorded in the patient's medical record by the nurse. TECHNIQUE: Informed consent was obtained after a detailed explanation of the procedure including risks, benefits, and alternatives. Universal protocol was followed. A suitable skin site was prepped and draped in sterile fashion following CT localization. Using CT guidance, a 5 Western Jillian Yueh centesis needle was advanced into the posterior subhepatic collection via a posterolateral approach. A 0.035 guidewire was used to place a 10 Serbian abscess drainage catheter after the fascial tract was dilated.   The catheter was sutured to the skin with suture and was attached to HALLIE suction Anticoagulation after chest tube placed, reviewed by oncology, recommending Xarelto. Abdominal venous thrombosis would not be unexpected with intra abdominal inflammatory process, doubt hypercoagulable state  Anemia. Transfuse for Hb< 7g/dL. Leukocytosis. Was improving  Severe malnutrition. Poor by mouth intake has lost weight  DVT prophylaxis. On Lovenox. D/W nursing, patient.       Electronically signed by:  Azeb Tavarez MD    4/1/2019    9:19 PM.

## 2019-04-03 ENCOUNTER — APPOINTMENT (OUTPATIENT)
Dept: GENERAL RADIOLOGY | Age: 29
DRG: 862 | End: 2019-04-03
Payer: MEDICARE

## 2019-04-03 LAB
ANION GAP SERPL CALCULATED.3IONS-SCNC: 5 MMOL/L (ref 3–16)
BASOPHILS ABSOLUTE: 0 K/UL (ref 0–0.2)
BASOPHILS RELATIVE PERCENT: 0.1 %
BUN BLDV-MCNC: 5 MG/DL (ref 7–20)
CALCIUM SERPL-MCNC: 7.8 MG/DL (ref 8.3–10.6)
CHLORIDE BLD-SCNC: 99 MMOL/L (ref 99–110)
CO2: 30 MMOL/L (ref 21–32)
CREAT SERPL-MCNC: <0.5 MG/DL (ref 0.6–1.1)
EOSINOPHILS ABSOLUTE: 0.2 K/UL (ref 0–0.6)
EOSINOPHILS RELATIVE PERCENT: 2.3 %
GFR AFRICAN AMERICAN: >60
GFR NON-AFRICAN AMERICAN: >60
GLUCOSE BLD-MCNC: 97 MG/DL (ref 70–99)
HCT VFR BLD CALC: 24.3 % (ref 36–48)
HEMOGLOBIN: 8.2 G/DL (ref 12–16)
LYMPHOCYTES ABSOLUTE: 2.5 K/UL (ref 1–5.1)
LYMPHOCYTES RELATIVE PERCENT: 28.2 %
MCH RBC QN AUTO: 32 PG (ref 26–34)
MCHC RBC AUTO-ENTMCNC: 33.8 G/DL (ref 31–36)
MCV RBC AUTO: 94.9 FL (ref 80–100)
MONOCYTES ABSOLUTE: 0.8 K/UL (ref 0–1.3)
MONOCYTES RELATIVE PERCENT: 8.4 %
NEUTROPHILS ABSOLUTE: 5.4 K/UL (ref 1.7–7.7)
NEUTROPHILS RELATIVE PERCENT: 61 %
PDW BLD-RTO: 18.3 % (ref 12.4–15.4)
PLATELET # BLD: 263 K/UL (ref 135–450)
PMV BLD AUTO: 8.2 FL (ref 5–10.5)
POTASSIUM SERPL-SCNC: 4.3 MMOL/L (ref 3.5–5.1)
RBC # BLD: 2.56 M/UL (ref 4–5.2)
SODIUM BLD-SCNC: 134 MMOL/L (ref 136–145)
WBC # BLD: 8.9 K/UL (ref 4–11)

## 2019-04-03 PROCEDURE — 6370000000 HC RX 637 (ALT 250 FOR IP): Performed by: INTERNAL MEDICINE

## 2019-04-03 PROCEDURE — 6370000000 HC RX 637 (ALT 250 FOR IP): Performed by: SURGERY

## 2019-04-03 PROCEDURE — 2500000003 HC RX 250 WO HCPCS: Performed by: SURGERY

## 2019-04-03 PROCEDURE — 99024 POSTOP FOLLOW-UP VISIT: CPT | Performed by: SURGERY

## 2019-04-03 PROCEDURE — 99232 SBSQ HOSP IP/OBS MODERATE 35: CPT | Performed by: INTERNAL MEDICINE

## 2019-04-03 PROCEDURE — APPSS30 APP SPLIT SHARED TIME 16-30 MINUTES: Performed by: CLINICAL NURSE SPECIALIST

## 2019-04-03 PROCEDURE — 6360000002 HC RX W HCPCS: Performed by: INTERNAL MEDICINE

## 2019-04-03 PROCEDURE — 97110 THERAPEUTIC EXERCISES: CPT

## 2019-04-03 PROCEDURE — 6360000002 HC RX W HCPCS: Performed by: SURGERY

## 2019-04-03 PROCEDURE — 2580000003 HC RX 258: Performed by: SURGERY

## 2019-04-03 PROCEDURE — 80048 BASIC METABOLIC PNL TOTAL CA: CPT

## 2019-04-03 PROCEDURE — 85025 COMPLETE CBC W/AUTO DIFF WBC: CPT

## 2019-04-03 PROCEDURE — APPNB30 APP NON BILLABLE TIME 0-30 MINS: Performed by: CLINICAL NURSE SPECIALIST

## 2019-04-03 PROCEDURE — 71045 X-RAY EXAM CHEST 1 VIEW: CPT

## 2019-04-03 PROCEDURE — 6370000000 HC RX 637 (ALT 250 FOR IP): Performed by: NURSE PRACTITIONER

## 2019-04-03 PROCEDURE — 36592 COLLECT BLOOD FROM PICC: CPT

## 2019-04-03 PROCEDURE — 1200000000 HC SEMI PRIVATE

## 2019-04-03 PROCEDURE — 97605 NEG PRS WND THER DME<=50SQCM: CPT

## 2019-04-03 PROCEDURE — 6370000000 HC RX 637 (ALT 250 FOR IP): Performed by: OBSTETRICS & GYNECOLOGY

## 2019-04-03 RX ADMIN — Medication 10 ML: at 09:34

## 2019-04-03 RX ADMIN — ENOXAPARIN SODIUM 50 MG: 60 INJECTION SUBCUTANEOUS at 09:59

## 2019-04-03 RX ADMIN — FAMOTIDINE 20 MG: 10 INJECTION, SOLUTION INTRAVENOUS at 20:15

## 2019-04-03 RX ADMIN — OXYCODONE HYDROCHLORIDE 10 MG: 5 TABLET ORAL at 09:58

## 2019-04-03 RX ADMIN — FOLIC ACID 1 MG: 1 TABLET ORAL at 09:57

## 2019-04-03 RX ADMIN — FLUCONAZOLE 100 MG: 100 TABLET ORAL at 09:58

## 2019-04-03 RX ADMIN — MORPHINE SULFATE 4 MG: 4 INJECTION INTRAVENOUS at 09:33

## 2019-04-03 RX ADMIN — ACETAMINOPHEN 650 MG: 325 TABLET ORAL at 23:59

## 2019-04-03 RX ADMIN — OXYCODONE HYDROCHLORIDE 10 MG: 5 TABLET ORAL at 17:12

## 2019-04-03 RX ADMIN — MEROPENEM 1 G: 1 INJECTION, POWDER, FOR SOLUTION INTRAVENOUS at 23:17

## 2019-04-03 RX ADMIN — MORPHINE SULFATE 4 MG: 4 INJECTION INTRAVENOUS at 06:50

## 2019-04-03 RX ADMIN — MORPHINE SULFATE 4 MG: 4 INJECTION INTRAVENOUS at 15:03

## 2019-04-03 RX ADMIN — COLLAGENASE SANTYL: 250 OINTMENT TOPICAL at 10:12

## 2019-04-03 RX ADMIN — POTASSIUM CHLORIDE 10 MEQ: 750 TABLET, EXTENDED RELEASE ORAL at 21:32

## 2019-04-03 RX ADMIN — MEROPENEM 1 G: 1 INJECTION, POWDER, FOR SOLUTION INTRAVENOUS at 17:17

## 2019-04-03 RX ADMIN — MEROPENEM 1 G: 1 INJECTION, POWDER, FOR SOLUTION INTRAVENOUS at 06:17

## 2019-04-03 RX ADMIN — FAMOTIDINE 20 MG: 10 INJECTION, SOLUTION INTRAVENOUS at 09:59

## 2019-04-03 RX ADMIN — MORPHINE SULFATE 4 MG: 4 INJECTION INTRAVENOUS at 12:40

## 2019-04-03 RX ADMIN — POTASSIUM CHLORIDE 10 MEQ: 750 TABLET, EXTENDED RELEASE ORAL at 09:57

## 2019-04-03 RX ADMIN — OXYCODONE HYDROCHLORIDE 10 MG: 5 TABLET ORAL at 21:32

## 2019-04-03 RX ADMIN — POLYETHYLENE GLYCOL 3350 17 G: 17 POWDER, FOR SOLUTION ORAL at 10:00

## 2019-04-03 RX ADMIN — CLOTRIMAZOLE: 10 CREAM TOPICAL at 20:15

## 2019-04-03 RX ADMIN — MORPHINE SULFATE 4 MG: 4 INJECTION INTRAVENOUS at 20:15

## 2019-04-03 RX ADMIN — MORPHINE SULFATE 4 MG: 4 INJECTION INTRAVENOUS at 23:17

## 2019-04-03 RX ADMIN — Medication 10 ML: at 20:15

## 2019-04-03 RX ADMIN — ENOXAPARIN SODIUM 50 MG: 60 INJECTION SUBCUTANEOUS at 20:15

## 2019-04-03 RX ADMIN — MORPHINE SULFATE 4 MG: 4 INJECTION INTRAVENOUS at 18:03

## 2019-04-03 RX ADMIN — MORPHINE SULFATE 4 MG: 4 INJECTION INTRAVENOUS at 04:46

## 2019-04-03 RX ADMIN — OXYCODONE HYDROCHLORIDE 10 MG: 5 TABLET ORAL at 03:34

## 2019-04-03 ASSESSMENT — PAIN DESCRIPTION - LOCATION
LOCATION: ABDOMEN
LOCATION: BACK;ABDOMEN
LOCATION: SHOULDER

## 2019-04-03 ASSESSMENT — PAIN SCALES - GENERAL
PAINLEVEL_OUTOF10: 8
PAINLEVEL_OUTOF10: 9
PAINLEVEL_OUTOF10: 8
PAINLEVEL_OUTOF10: 9
PAINLEVEL_OUTOF10: 10
PAINLEVEL_OUTOF10: 9
PAINLEVEL_OUTOF10: 10
PAINLEVEL_OUTOF10: 9
PAINLEVEL_OUTOF10: 9

## 2019-04-03 ASSESSMENT — PAIN DESCRIPTION - ORIENTATION: ORIENTATION: RIGHT

## 2019-04-03 ASSESSMENT — PAIN DESCRIPTION - FREQUENCY: FREQUENCY: CONTINUOUS

## 2019-04-03 ASSESSMENT — PAIN DESCRIPTION - PROGRESSION: CLINICAL_PROGRESSION: NOT CHANGED

## 2019-04-03 ASSESSMENT — PAIN DESCRIPTION - DESCRIPTORS: DESCRIPTORS: ACHING

## 2019-04-03 ASSESSMENT — PAIN DESCRIPTION - ONSET: ONSET: ON-GOING

## 2019-04-03 ASSESSMENT — PAIN - FUNCTIONAL ASSESSMENT: PAIN_FUNCTIONAL_ASSESSMENT: PREVENTS OR INTERFERES SOME ACTIVE ACTIVITIES AND ADLS

## 2019-04-03 ASSESSMENT — PAIN DESCRIPTION - PAIN TYPE: TYPE: ACUTE PAIN

## 2019-04-03 NOTE — PLAN OF CARE
Problem: Pain:  Goal: Control of acute pain  Description  Control of acute pain  4/3/2019 1129 by Conner Mckeon RN  Outcome: Ongoing  Note:   Admin prn analgesic as ordered, when available. Report ineffective pain mgmt to MD, if any.

## 2019-04-03 NOTE — PROGRESS NOTES
Hospitalist Progress Note      PCP: No primary care provider on file. 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 Venous Dopplers of lower extremity showed proximal femoral vein thrombosis. Started on therapeutic anticoagulation. Subjective:  Complains of pain at chest tube site. VAC dsg changed today. + stool in ostomy. Medications:  Reviewed    Infusion Medications     Scheduled Medications    oxyCODONE  10 mg Oral R7X    folic acid  1 mg Oral Daily    enoxaparin  1 mg/kg Subcutaneous BID    clotrimazole   Topical BID    fluconazole  100 mg Oral Daily    meropenem  1 g Intravenous Q8H    polyethylene glycol  17 g Oral Daily    sodium chloride flush  10 mL Intravenous 2 times per day    potassium chloride  10 mEq Oral BID    collagenase   Topical Daily    famotidine (PEPCID) injection  20 mg Intravenous BID     PRN Meds: sodium chloride flush, acetaminophen, ondansetron, morphine **OR** morphine      Intake/Output Summary (Last 24 hours) at 4/3/2019 1511  Last data filed at 4/3/2019 1450  Gross per 24 hour   Intake 1000 ml   Output 3650 ml   Net -2650 ml       Physical Exam Performed:    BP (!) 92/59   Pulse 91   Temp 98.8 °F (37.1 °C) (Oral)   Resp 18   Ht 4' 10\" (1.473 m)   Wt 113 lb (51.3 kg)   SpO2 97%   BMI 23.62 kg/m²     General appearance: Pleasant, young, female in no apparent distress, appears stated age and cooperative. HEENT: Pupils equal, round, and reactive to light. Conjunctivae/corneas clear. Neck: Supple, with full range of motion. No jugular venous distention. Trachea midline. Respiratory:  Normal respiratory effort. Clear to auscultation, bilaterally without Rales/Wheezes/Rhonchi. Right Chest tube in place - no air leak.   Serous drainage noted in [facility]. Cardiovascular: Regular rate and rhythm with normal S1/S2 without rubs or gallops. 3/6 systolic murmur. Abdomen: Soft, non-tender. VAC dsg noted to lower abdomen. +ostomy with stool in bag. Musculoskeletal: No clubbing, cyanosis or edema bilaterally. Chronic deformities caused by paraplegia, no acute changes. Skin: Skin color, texture, turgor normal.  No rashes or lesions. Neurologic:  Patient is paraplegic. Lower extremity motor and sensory function is impaired on a chronic basis. No new focal changes. Psychiatric: Alert and oriented, thought content appropriate, normal insight  Capillary Refill: Brisk,< 3 seconds   Peripheral Pulses: +2 palpable, equal bilaterally       Labs:   Recent Labs     04/03/19  0615   WBC 8.9   HGB 8.2*   HCT 24.3*        Recent Labs     04/03/19  0615   *   K 4.3   CL 99   CO2 30   BUN 5*   CREATININE <0.5*   CALCIUM 7.8*     No results for input(s): AST, ALT, BILIDIR, BILITOT, ALKPHOS in the last 72 hours. No results for input(s): INR in the last 72 hours. No results for input(s): Victorine Chihu[place]ua in the last 72 hours. Urinalysis:      Lab Results   Component Value Date    NITRU Negative 03/27/2019    WBCUA 3-5 03/20/2019    BACTERIA Rare 03/15/2019    RBCUA None seen 03/20/2019    BLOODU Negative 03/27/2019    SPECGRAV 1.010 03/27/2019    GLUCOSEU Negative 03/27/2019       Radiology:  XR CHEST PORTABLE   Final Result   Improved right pleural effusion. CT CHEST WO CONTRAST   Final Result   Decreased apparent multiloculated right pleural effusion status post pleural   catheter placement. The pleural catheter appears to been retracted several   cm. Careful attention is necessary to prevent further extraction. Small areas of nondependent air are seen as described but without significant   affect. Compressive collapse right lower lobe.       The right upper subphrenic trans pleural abscess drainage catheter is no   longer surrounded by remaining fluid. RECOMMENDATIONS:   Consider removal of the percutaneous abdominal right lateral subphrenic   drainage catheter. Careful attention to avoid further retraction of the recently placed   posteromedial left pleural catheter. The findings were sent to the Radiology Results [id number] at 11:11   am on 4/2/2019to be communicated to a licensed caregiver. XR CHEST PORTABLE   Final Result   Resolved right pneumothorax         XR CHEST PORTABLE   Final Result   1. No significant change. XR CHEST PORTABLE   Final Result   Small to moderate right-sided pneumothorax with decreased pleural fluid. CT GUIDED PLEURAL DRAINAGE W CATH PERC   Final Result   Successful CT guided placement of a right chest tube         VL Extremity Venous Bilateral   Final Result      CT CHEST ABDOMEN PELVIS W CONTRAST   Final Result   Addendum 1 of 1   ADDENDUM:   Impression section should include:      Filling defect in the right internal iliac vein on series 3, image 111 is   again noted. Findings highly suspicious for thrombus. The findings were sent to the Radiology Results [id number] at    12:18   pm on 3/28/2019to be communicated to a licensed caregiver. Final      XR CHEST PORTABLE   Final Result   Interval development of moderate partially loculated right pleural effusion. A the aerated right lung shows opacification compatible with atelectasis   and/or pneumonia. IR PICC WO SQ PORT/PUMP > 5 YEARS   Final Result   Successful placement of PICC line. CT ABDOMEN PELVIS W IV CONTRAST Additional Contrast? None   Final Result   Moderate residual abscess along the anterior hepatic margin, anterior to the   right lateral pigtail drainage catheter. Small amount of loculated fluid along the right pericolic gutter, extending   inferiorly to the pelvis, compatible with additional component of abscess.       Moderate right pleural effusion and trace left pleural effusion. Bibasilar   atelectasis. Anasarca. Nonocclusive thrombus versus mixing artifact within right internal iliac vein. Assessment/Plan:    Active Hospital Problems    Diagnosis Date Noted    Hydropneumothorax [J94.8] 03/30/2019    Pneumothorax [J93.9]     Pressure injury of sacral region, unstageable [facility]) [L89.150] 03/28/2019    Loculated pleural effusion [J90]     Abnormal CT scan, chest [R93.89]     Acute deep vein thrombosis (DVT) of non-extremity vein [I82.90]     Anemia [D64.9]     Rupture of operation wound [T81.31XA]     Moderate malnutrition ([address] [E44.0] 03/21/2019    Generalized abdominal pain [R10.84]     Post-operative nausea and vomiting [R11.2, Z98.890] 03/20/2019     PLAN:    Iliac vein thrombosis immobility, paraplegic, post op Block pouch for intra-abdominal abscess. Hx of DVT  - Continue Lovenox while the patient is in-house, proceed with oral Xarelto indefinitely. - Patient understands the need for compliance and will need [employer] AC  - hem/onc consulted and following  - Factor 5 Leiden and prothrombin gene negative  - anti-phospholipids negative. - hematology consulted.     Pleural effusion, right, loculated   - Chest tube in place.    - CT chest completed 4/2 - loculated right pleural effusion noted. - s/p TPA 4/2.  - CXR 4/3 appears improved.       Intraabdominal abscess post sigmoid perforation  - IV meropenem (s 3/25)     Anemia  - No evidence of acute blood loss  - Most likely caused by chronic illness. - follow CBC.     Paraplegia  Patient is stable at baseline.     Abdominal wound   - wound VAC in place - changed 4/3.  - wound care RN following    DVT Prophylaxis: Therapeutic anticoagulation   Diet: DIET LOW FIBER;  Code Status: Full Code    PT/OT Eval Status: planning for [facility] - per primary team     SHIRLEY Schroeder - CNP

## 2019-04-03 NOTE — FLOWSHEET NOTE
Pt A&Ox4. VSS-BP runs low. Chest tube to low suction with serosanguineous drainage. Dressing intact. Wound vac to mid abdomen. Pain controlled with IV and PO medication. Pt still rates pain at 9/10 out of 1/10 scale. Denies other needs. Call light within reach, bed in lowest position, wheels locked. Will monitor.

## 2019-04-03 NOTE — PROGRESS NOTES
[facility] Wound Ostomy Continence Nurse  Follow-up Progress Note       NAME:  Bladimir Schulz  MEDICAL RECORD NUMBER:  [id number]  AGE:  34 y.o. GENDER:  female  :  1990  TODAY'S DATE:  4/3/2019    Subjective:  Not sure when I will be leaving    Wound Identification:  Dehisced distal mid abdominal incision line. Small wounds on abdomin now pink or red (were scabs) from stay suture sites on -  and healing well.  Mid coccyx extending to right buttocks unstageable wound (not witnessed today).  Right ischium DTI stage 2 pressure injury (Not witnessed today).  Left thigh blanchable pressure injury (not witnessed today).  Right and left heels stage 1 pressure injury (Did not witness these wounds today). Right lateral chest tube now. Patient Goal of Care:  [x] Wound Healing  [] Odor Control   [] Palliative Care  [] Pain Control   [] Other:     Objective:  No drainage from VAC dressing. Lying in bed. /67   Pulse 93   Temp 98.1 °F (36.7 °C) (Oral)   Resp 16   Ht 4' 10\" (1.473 m)   Wt 113 lb (51.3 kg)   SpO2 95%   BMI 23.62 kg/m²   Tom Risk Score: Tom Scale Score: 12  Assessment:  Red wound bed. Measurements smaller today. Ester wound intact. Measurements:  Negative Pressure Wound Therapy Abdomen Mid (Active)   $ Standard NPWT <=50 sq cm PER TX $ Yes 4/3/2019 11:00 AM   Wound Type Surgical 4/3/2019 11:00 AM   Unit Type [employer] 4/3/2019 11:00 AM   Dressing Type Black foam 4/3/2019 11:00 AM   Number of pieces used 1 4/3/2019 11:00 AM   Cycle Continuous 4/3/2019 11:00 AM   Target Pressure (mmHg) 125 4/3/2019 11:00 AM   Intensity 1 4/3/2019 11:00 AM   Canister changed?  No 4/3/2019 11:00 AM   Dressing Status Changed 4/3/2019 11:00 AM   Dressing Changed Changed/New 4/3/2019 11:00 AM   Drainage Amount None 4/3/2019 11:00 AM   Drainage Description Serosanguinous 2019 12:00 PM   Dressing Change Due 19 12:00 PM   Output (ml) 0 ml 4/3/2019 11:00 AM   Wound Assessment Red;Granulation tissue 4/1/2019 12:00 PM   Ester-wound Assessment Clean;Dry; Intact 4/3/2019 11:00 AM   Shape shaped like a richar tree 4/3/2019 11:00 AM   Odor None 4/3/2019 11:00 AM   Number of days: 9       Wound 03/06/19 Ischium Right  ischium - evolved to stage 2 (Active)   Wound Image   3/27/2019 10:06 AM   Wound Pressure Stage  2 4/1/2019  9:46 PM   Dressing Status Clean;Dry; Intact 4/2/2019  8:45 PM   Dressing Changed Changed/New 4/2/2019  8:45 PM   Dressing/Treatment Foam 4/2/2019  8:45 PM   Wound Cleansed Rinsed/Irrigated with saline 4/1/2019  9:00 AM   Dressing Change Due 04/04/19 4/1/2019  9:00 AM   Wound Length (cm) 2.5 cm 3/27/2019 10:06 AM   Wound Width (cm) 3 cm 3/27/2019 10:06 AM   Wound Depth (cm) 0 cm 3/27/2019 10:06 AM   Wound Surface Area (cm^2) 7.5 cm^2 3/27/2019 10:06 AM   Change in Wound Size % (l*w) -4900 3/27/2019 10:06 AM   Wound Volume (cm^3) 0 cm^3 3/27/2019 10:06 AM   Distance Tunneling (cm) 0 cm 3/27/2019 10:06 AM   Tunneling Position ___ O'Clock 0 3/27/2019 10:06 AM   Undermining Starts ___ O'Clock 0 3/27/2019 10:06 AM   Undermining Ends___ O'Clock 0 3/27/2019 10:06 AM   Undermining Maxium Distance (cm) 0 3/27/2019 10:06 AM   Wound Assessment Clean;Dry; Intact 3/31/2019 11:30 AM   Drainage Amount None 3/31/2019 11:30 AM   Drainage Description Clear 3/29/2019  8:00 AM   Odor None 3/29/2019  9:12 PM   Margins Attached edges; Defined edges 3/29/2019  9:12 PM   Ester-wound Assessment Clean;Dry; Intact 3/31/2019 11:30 AM   Non-staged Wound Description Partial thickness 3/29/2019  8:00 AM   [place]%Wound Bed 100 3/27/2019 10:06 AM   Red%Wound Bed 60 3/25/2019 11:35 AM   Purple%Wound Bed 0 3/25/2019 11:35 AM   Culture Taken No 3/27/2019 10:06 AM   Number of days: 27       Wound 03/09/19 Coccyx Right mid coccyx extending to right  buttocks (Active)   Wound Image   3/27/2019 10:06 AM   Wound Pressure Unstageable 4/1/2019  9:46 PM   Dressing Status Clean;Dry; Intact 4/2/2019  8:45 PM   Dressing Changed Changed/New 4/2/2019  8:45 PM   Dressing/Treatment Santyl 4/2/2019  8:45 PM   Wound Cleansed Rinsed/Irrigated with saline 4/2/2019  9:00 AM   Dressing Change Due 04/03/19 4/2/2019  8:45 PM   Wound Length (cm) 6 cm 3/27/2019 10:06 AM   Wound Width (cm) 7.5 cm 3/27/2019 10:06 AM   Wound Depth (cm) 0.1 cm 3/27/2019 10:06 AM   Wound Surface Area (cm^2) 45 cm^2 3/27/2019 10:06 AM   Change in Wound Size % (l*w) -40.62 3/27/2019 10:06 AM   Wound Volume (cm^3) 4.5 cm^3 3/27/2019 10:06 AM   Wound Healing % -41 3/27/2019 10:06 AM   Distance Tunneling (cm) 0 cm 3/27/2019 10:06 AM   Tunneling Position ___ O'Clock 0 3/27/2019 10:06 AM   Undermining Starts ___ O'Clock 0 3/27/2019 10:06 AM   Undermining Ends___ O'Clock 0 3/27/2019 10:06 AM   Undermining Maxium Distance (cm) 0 3/27/2019 10:06 AM   Wound Assessment Black;Red;Yellow 4/2/2019  8:45 PM   Drainage Amount Moderate 4/2/2019  8:45 PM   Drainage Description Purulent 4/2/2019  8:45 PM   Odor Mild 4/2/2019  8:45 PM   Margins Unattached edges; Defined edges 3/27/2019 10:06 AM   Ester-wound Assessment Blanchable erythema 3/31/2019 11:30 AM   Non-staged Wound Description Partial thickness 3/29/2019  8:00 AM   Red%Wound Bed 15 3/31/2019 11:30 AM   Yellow%Wound Bed 5 3/31/2019 11:30 AM   Black%Wound Bed 80 3/31/2019 11:30 AM   Purple%Wound Bed 60 3/25/2019  9:50 AM   Culture Taken No 3/27/2019 10:06 AM   Number of days: 24       Wound 03/21/19 Heel Right (Active)   Wound Image   4/1/2019 12:00 PM   Wound Pressure Stage  1 4/1/2019  9:46 PM   Dressing Status Clean;Dry; Intact 4/2/2019  8:45 PM   Dressing Changed Changed/New 4/2/2019  8:45 PM   Dressing/Treatment Foam 4/2/2019  8:45 PM   Wound Cleansed Not Cleansed 3/28/2019 10:45 PM   Dressing Change Due 04/03/19 3/31/2019 11:30 AM   Wound Length (cm) 1 cm 3/27/2019 10:06 AM   Wound Width (cm) 1 cm 3/27/2019 10:06 AM   Wound Depth (cm) 0 cm 3/27/2019 10:06 AM   Wound Surface Area (cm^2) 1 cm^2 3/27/2019 10:06 AM   Change in Wound Size % (l*w) 50 3/27/2019 10:06 AM   Wound Volume (cm^3) 0 cm^3 3/27/2019 10:06 AM   Distance Tunneling (cm) 0 cm 3/27/2019 10:06 AM   Tunneling Position ___ O'Clock 0 3/27/2019 10:06 AM   Undermining Starts ___ O'Clock 0 3/27/2019 10:06 AM   Undermining Ends___ O'Clock 0 3/27/2019 10:06 AM   Undermining Maxium Distance (cm) 0 3/27/2019 10:06 AM   Wound Assessment Clean;Dry; Intact 3/31/2019 11:30 AM   Drainage Amount None 3/31/2019 11:30 AM   Odor None 3/30/2019  8:23 PM   Margins Attached edges; Defined edges 3/29/2019  9:12 PM   Ester-wound Assessment Clean;Dry; Intact 4/1/2019  9:00 AM   [place]%Wound Bed 100 3/25/2019  9:50 AM   Red%Wound Bed 100 3/27/2019 10:06 AM   Culture Taken No 3/27/2019 10:06 AM   Number of days: 13       Wound 03/20/19 Heel Left;Posterior Stage 1 Left heel (Active)   Wound Image   3/27/2019 10:06 AM   Wound Pressure Stage  1 4/1/2019  9:46 PM   Dressing Status Clean;Dry; Intact 4/2/2019  8:45 PM   Dressing Changed Changed/New 4/2/2019  8:45 PM   Dressing/Treatment Foam 4/2/2019  8:45 PM   Wound Cleansed Not Cleansed 3/28/2019 10:45 PM   Dressing Change Due 04/03/19 3/31/2019 11:30 AM   Wound Length (cm) 2 cm 3/27/2019 10:06 AM   Wound Width (cm) 1 cm 3/27/2019 10:06 AM   Wound Depth (cm) 0 cm 3/27/2019 10:06 AM   Wound Surface Area (cm^2) 2 cm^2 3/27/2019 10:06 AM   Change in Wound Size % (l*w) 50 3/27/2019 10:06 AM   Wound Volume (cm^3) 0 cm^3 3/27/2019 10:06 AM   Distance Tunneling (cm) 0 cm 3/27/2019 10:06 AM   Tunneling Position ___ O'Clock 0 3/27/2019 10:06 AM   Undermining Starts ___ O'Clock 0 3/27/2019 10:06 AM   Undermining Ends___ O'Clock 0 3/27/2019 10:06 AM   Undermining Maxium Distance (cm) 0 3/27/2019 10:06 AM   Wound Assessment Clean;Dry; Intact 3/31/2019 11:30 AM   Drainage Amount None 3/31/2019 11:30 AM   Odor None 3/30/2019  8:23 PM   Margins Attached edges; Defined edges 3/29/2019  9:12 PM   Ester-wound Assessment Clean;Dry; Intact 3/31/2019 11:30 AM   [place]%Wound Bed 100 3/25/2019  9:50 AM   Red%Wound Bed 100 3/27/2019 10:06 AM   Culture Taken No 3/27/2019 10:06 AM   Number of days: 14       Wound 03/20/19 Hip Left;Lateral Left Trochanter Stage 1 (Active)   Wound Image   3/21/2019 12:15 PM   Wound Pressure Stage  1 4/1/2019  9:46 PM   Dressing Status Clean;Dry; Intact 4/2/2019  8:45 PM   Dressing Changed Changed/New 4/1/2019  9:00 AM   Dressing/Treatment Foam 4/2/2019  8:45 PM   Wound Cleansed Rinsed/Irrigated with saline 4/1/2019  9:00 AM   Dressing Change Due 04/03/19 3/31/2019 11:30 AM   Wound Length (cm) 2.5 cm 3/27/2019 10:06 AM   Wound Width (cm) 2.5 cm 3/27/2019 10:06 AM   Wound Depth (cm) 0 cm 3/27/2019 10:06 AM   Wound Surface Area (cm^2) 6.25 cm^2 3/27/2019 10:06 AM   Change in Wound Size % (l*w) 30.56 3/27/2019 10:06 AM   Wound Volume (cm^3) 0 cm^3 3/27/2019 10:06 AM   Distance Tunneling (cm) 0 cm 3/27/2019 10:06 AM   Tunneling Position ___ O'Clock 0 3/27/2019 10:06 AM   Undermining Starts ___ O'Clock 0 3/27/2019 10:06 AM   Undermining Ends___ O'Clock 0 3/27/2019 10:06 AM   Undermining Maxium Distance (cm) 0 3/27/2019 10:06 AM   Wound Assessment Clean;Dry; Intact 3/31/2019 11:30 AM   Drainage Amount None 3/31/2019 11:30 AM   Odor None 3/30/2019  8:23 PM   Margins Attached edges; Defined edges 3/29/2019  9:12 PM   Ester-wound Assessment Clean;Dry; Intact 3/31/2019 11:30 AM   Non-staged Wound Description Not applicable 9/26/8312 18:51 AM   Red%Wound Bed 100 3/27/2019 10:06 AM   Culture Taken No 3/27/2019 10:06 AM   Number of days: 14     Incision 03/05/19 Abdomen Mid;Distal (Active)   Wound Image   4/1/2019 12:00 PM   Wound Assessment Red;Granulation tissue 4/3/2019 11:00 AM   Ester-wound Assessment Clean;Dry; Intact 4/3/2019 11:00 AM   Wound Length (cm) 4.3 cm 4/3/2019 11:00 AM   Wound Width (cm) 3 cm 4/3/2019 11:00 AM   Wound Depth (cm) 0.4 cm 4/3/2019 11:00 AM   Wound Volume (cm^3) 5.16 cm^3 4/3/2019 11:00 AM   Wound Healing % 92 4/3/2019 11:00 AM Closure None 4/3/2019 11:00 AM   Drainage Amount None 4/3/2019 11:00 AM   Drainage Description Serosanguinous 4/1/2019 12:00 PM   Odor None 4/3/2019 11:00 AM   Dressing/Treatment Hydrocolloid;Barrier Film;Vacuum dressing 4/3/2019 11:00 AM   Dressing Changed Changed/New 4/3/2019 11:00 AM   Dressing Status Changed 4/3/2019 11:00 AM   Dressing Change Due 04/05/19 4/3/2019 11:00 AM   Number of days: 28       Response to treatment:  Well tolerated by patient. Pain Assessment:  Severity:  3 / 10  Quality of pain: states feels better since pain med  Wound Pain Timing/Severity: intermittent  Premedicated: Yes  Plan:   Plan of Care: [REMOVED] Wound 03/09/19 Sacrum-Dressing/Treatment: Pharmaceutical agent (see MAR), Moist to moist, Dry Dressing, Medipore  Wound 03/06/19 Ischium Right  ischium - evolved to stage 2-Dressing/Treatment: Foam  Wound 03/09/19 Coccyx Right mid coccyx extending to right  buttocks-Dressing/Treatment: Santyl  Wound 03/21/19 Heel Right-Dressing/Treatment: Foam  [REMOVED] Incision 03/01/19 Abdomen Medial;Mid-Dressing/Treatment: Open to air  Incision 03/05/19 Abdomen Mid;Distal-Dressing/Treatment: Hydrocolloid, Barrier Film, Vacuum dressing  Wound 03/20/19 Heel Left;Posterior Stage 1 Left heel-Dressing/Treatment: Foam  Wound 03/20/19 Hip Left;Lateral Left Trochanter Stage 1-Dressing/Treatment: Foam     Negative pressure wound therapy (NPWT) dressing changed today.  dsg removed.  Cleansed wound with NSS. Hydrocolloid and barrier film applied to open stay suture exit sited and around open dehisced incision.   1 black sponged applied into wound bed  Covered with VAC drape. Connected to 125 mmHg continuous low suction.  Plan to change M-W-F.  Wound care to follow.       Instructed bed side ASHLEY Majano) to change other dressings.      Specialty Bed Required : Yes   [] Low Air Loss   [] Pressure Redistribution  [x] Fluid Immersion dolphin mattress.  [] Bariatric  [] Total Pressure Relief  [] Other: Current Diet: DIET LOW FIBER;   Dietician consult:  Yes    Discharge Plan:  Placement for patient upon discharge: skilled nursing   Patient appropriate for Outpatient [address]: Yes follow up with surgeon    Referrals:  []  active  [] [facility]  [] Supplies  [] Other    Patient/Caregiver Teaching: Updated on wound progression  Level of patient/caregiver understanding able to:   [x] Indicates understanding       [] Needs reinforcement  [] Unsuccessful      [] Verbal Understanding  [] Demonstrated understanding       [] No evidence of learning  [] Refused teaching         [] N/A       Electronically signed by Driss Posada, RN, MSN, Lucious Gilford on 4/3/2019 at 11:31 AM
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
## 2019-04-03 NOTE — PROGRESS NOTES
ONCOLOGY HEMATOLOGY CARE PROGRESS NOTE      SUBJECTIVE:     She is still having chest tube pain. Had to have TPA instilled yesterday. Factor 5 Leiden neg. Remains on Lovenox 50 BID treatment dose. She says that the narcotic pills are not effective as they do not \"work as fast\". ROS:   The remaining 10 point review of symptoms is unremarkable. OBJECTIVE        Physical    VITALS:  /67   Pulse 93   Temp 98.1 °F (36.7 °C) (Oral)   Resp 16   Ht 4' 10\" (1.473 m)   Wt 113 lb (51.3 kg)   SpO2 96%   BMI 23.62 kg/m²   TEMPERATURE:  Current - Temp: 98.1 °F (36.7 °C); Max - Temp  Av.8 °F (37.1 °C)  Min: 98.1 °F (36.7 °C)  Max: 99.3 °F (37.4 °C)  PULSE OXIMETRY RANGE: SpO2  Av.3 %  Min: 95 %  Max: 98 %  24HR INTAKE/OUTPUT:      Intake/Output Summary (Last 24 hours) at 4/3/2019 1242  Last data filed at 4/3/2019 1100  Gross per 24 hour   Intake 1940 ml   Output 3550 ml   Net -1610 ml       CONSTITUTIONAL:  awake, alert, cooperative, no apparent distress, HEENT oral pharynx , no scleral icterus  HEMATOLOGIC/LYMPHATICS:  no cervical lymphadenopathy, no supraclavicular lymphadenopathy, no axillary lymphadenopathy and no inguinal lymphadenopathy  LUNGS:  No increased work of breathing, good air exchange, clear to auscultation bilaterally, no crackles or wheezing, R chest tube with serous output   CARDIOVASCULAR:  , regular rate and rhythm, normal S1 and S2, no S3 or S4, and no murmur noted  ABDOMEN:  No scars, normal bowel sounds, soft, non-distended, non-tender, no masses palpated, no hepatosplenomegally, + OSTOMY , wound vac in place   MUSCULOSKELETAL:  There is no redness, warmth, or swelling of the joints. EXTREMETIES: No clubbing cynosis or edema, flaccid with foot drop bilaterally   NEUROLOGIC:  Awake, alert, oriented to name, place and time.   Paraplegic   SKIN:  no bruising or bleeding  : clear yellow urine       Data      Recent Labs 04/03/19  0615   WBC 8.9   HGB 8.2*   HCT 24.3*      MCV 94.9        Recent Labs     04/03/19  0615   *   K 4.3   CL 99   CO2 30   BUN 5*   CREATININE <0.5*     No results for input(s): AST, ALT, ALB, BILIDIR, BILITOT, ALKPHOS in the last 72 hours. Magnesium:    Lab Results   Component Value Date    MG 1.80 03/25/2019    MG 1.80 03/01/2019         Problem List  Patient Active Problem List   Diagnosis    S/P partial colectomy    Perforated viscus    Acute hypoxemic respiratory failure (HCC)    Methemoglobinemia    Post-traumatic paraplegia    E-coli UTI    Subphrenic abscess (HCC)    Moderate malnutrition (HCC)    Post-operative nausea and vomiting    Generalized abdominal pain    Pressure injury of sacral region, unstageable ([address]    Loculated pleural effusion    Abnormal CT scan, chest    Acute deep vein thrombosis (DVT) of non-extremity vein    Anemia    Rupture of operation wound    Pneumothorax    Hydropneumothorax       ASSESSMENT AND PLAN:    Iliac Vein Thrombosis, immobility, paraplegic, post op Block pouch for intra-abdominal abscess   - Factor 5 Leiden and prothrombin gene sent 3/29- negative   - Check anti-phospholipids -negative   - Will not need additional hypercoag work up per Dr. Luiz Sheppard   - Will need to be on Xarelto (was on this previously and stopped due to non compliance). I told the patient she would need Xarelto LIFELONG to prevent a deadly blood clot. She seemed to understand. Currently on treatment dose Lovenox- okay to continue and transition to Xarelto on discharge. Will need 15 mg PO BID X 21 days then 20 mg PO daily.   - Hx of DVT      Anemia  - check micronutrients and replace as needed , Iron studies are mixed. Send STR - negative. Does not need iron. Anemia is due to chronic disease and inflammatory loses.    - on Folate 1 mg PO daily     Pain  - Oxy IR   - Morphine IVP     ONCOLOGIC DISPOSITION: per gen surgery and IM       Ag Batista

## 2019-04-04 PROCEDURE — 6370000000 HC RX 637 (ALT 250 FOR IP): Performed by: NURSE PRACTITIONER

## 2019-04-04 PROCEDURE — 99024 POSTOP FOLLOW-UP VISIT: CPT | Performed by: SURGERY

## 2019-04-04 PROCEDURE — 6370000000 HC RX 637 (ALT 250 FOR IP): Performed by: SURGERY

## 2019-04-04 PROCEDURE — 2500000003 HC RX 250 WO HCPCS: Performed by: SURGERY

## 2019-04-04 PROCEDURE — 1200000000 HC SEMI PRIVATE

## 2019-04-04 PROCEDURE — 6360000002 HC RX W HCPCS: Performed by: SURGERY

## 2019-04-04 PROCEDURE — 2580000003 HC RX 258: Performed by: SURGERY

## 2019-04-04 PROCEDURE — 6360000002 HC RX W HCPCS: Performed by: INTERNAL MEDICINE

## 2019-04-04 PROCEDURE — APPSS30 APP SPLIT SHARED TIME 16-30 MINUTES: Performed by: CLINICAL NURSE SPECIALIST

## 2019-04-04 PROCEDURE — 6370000000 HC RX 637 (ALT 250 FOR IP): Performed by: OBSTETRICS & GYNECOLOGY

## 2019-04-04 PROCEDURE — APPNB30 APP NON BILLABLE TIME 0-30 MINS: Performed by: CLINICAL NURSE SPECIALIST

## 2019-04-04 PROCEDURE — 99232 SBSQ HOSP IP/OBS MODERATE 35: CPT | Performed by: INTERNAL MEDICINE

## 2019-04-04 PROCEDURE — 6370000000 HC RX 637 (ALT 250 FOR IP): Performed by: INTERNAL MEDICINE

## 2019-04-04 PROCEDURE — 2500000003 HC RX 250 WO HCPCS: Performed by: NURSE PRACTITIONER

## 2019-04-04 RX ADMIN — OXYCODONE HYDROCHLORIDE 10 MG: 5 TABLET ORAL at 09:23

## 2019-04-04 RX ADMIN — MORPHINE SULFATE 4 MG: 4 INJECTION INTRAVENOUS at 10:50

## 2019-04-04 RX ADMIN — Medication 10 ML: at 20:34

## 2019-04-04 RX ADMIN — FOLIC ACID 1 MG: 1 TABLET ORAL at 09:23

## 2019-04-04 RX ADMIN — Medication 10 ML: at 09:24

## 2019-04-04 RX ADMIN — POLYETHYLENE GLYCOL 3350 17 G: 17 POWDER, FOR SOLUTION ORAL at 09:22

## 2019-04-04 RX ADMIN — MICONAZOLE NITRATE: 2 POWDER TOPICAL at 20:39

## 2019-04-04 RX ADMIN — MORPHINE SULFATE 4 MG: 4 INJECTION INTRAVENOUS at 06:22

## 2019-04-04 RX ADMIN — FAMOTIDINE 20 MG: 10 INJECTION, SOLUTION INTRAVENOUS at 20:33

## 2019-04-04 RX ADMIN — MORPHINE SULFATE 4 MG: 4 INJECTION INTRAVENOUS at 23:46

## 2019-04-04 RX ADMIN — CLOTRIMAZOLE: 10 CREAM TOPICAL at 09:30

## 2019-04-04 RX ADMIN — MORPHINE SULFATE 4 MG: 4 INJECTION INTRAVENOUS at 18:35

## 2019-04-04 RX ADMIN — POTASSIUM CHLORIDE 10 MEQ: 750 TABLET, EXTENDED RELEASE ORAL at 20:34

## 2019-04-04 RX ADMIN — MEROPENEM 1 G: 1 INJECTION, POWDER, FOR SOLUTION INTRAVENOUS at 23:45

## 2019-04-04 RX ADMIN — MEROPENEM 1 G: 1 INJECTION, POWDER, FOR SOLUTION INTRAVENOUS at 06:22

## 2019-04-04 RX ADMIN — MORPHINE SULFATE 4 MG: 4 INJECTION INTRAVENOUS at 14:43

## 2019-04-04 RX ADMIN — COLLAGENASE SANTYL: 250 OINTMENT TOPICAL at 17:21

## 2019-04-04 RX ADMIN — MORPHINE SULFATE 4 MG: 4 INJECTION INTRAVENOUS at 01:50

## 2019-04-04 RX ADMIN — MEROPENEM 1 G: 1 INJECTION, POWDER, FOR SOLUTION INTRAVENOUS at 14:45

## 2019-04-04 RX ADMIN — ENOXAPARIN SODIUM 50 MG: 60 INJECTION SUBCUTANEOUS at 09:24

## 2019-04-04 RX ADMIN — FLUCONAZOLE 100 MG: 100 TABLET ORAL at 09:23

## 2019-04-04 RX ADMIN — FAMOTIDINE 20 MG: 10 INJECTION, SOLUTION INTRAVENOUS at 09:23

## 2019-04-04 RX ADMIN — RIVAROXABAN 15 MG: 15 TABLET, FILM COATED ORAL at 20:43

## 2019-04-04 RX ADMIN — MICONAZOLE NITRATE: 2 POWDER TOPICAL at 17:21

## 2019-04-04 RX ADMIN — OXYCODONE HYDROCHLORIDE 10 MG: 5 TABLET ORAL at 20:33

## 2019-04-04 RX ADMIN — OXYCODONE HYDROCHLORIDE 10 MG: 5 TABLET ORAL at 03:30

## 2019-04-04 RX ADMIN — POTASSIUM CHLORIDE 10 MEQ: 750 TABLET, EXTENDED RELEASE ORAL at 09:23

## 2019-04-04 RX ADMIN — OXYCODONE HYDROCHLORIDE 10 MG: 5 TABLET ORAL at 16:24

## 2019-04-04 ASSESSMENT — PAIN SCALES - GENERAL
PAINLEVEL_OUTOF10: 9
PAINLEVEL_OUTOF10: 7
PAINLEVEL_OUTOF10: 10
PAINLEVEL_OUTOF10: 9
PAINLEVEL_OUTOF10: 7
PAINLEVEL_OUTOF10: 10
PAINLEVEL_OUTOF10: 9

## 2019-04-04 NOTE — PROGRESS NOTES
Hospitalist Progress Note      PCP: No primary care provider on file. 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 Venous Dopplers of lower extremity showed proximal femoral vein thrombosis. Started on therapeutic anticoagulation. Subjective:  Complains of pain at chest tube site - planning for CT removal today. Low grade temp noted overnight. Medications:  Reviewed    Infusion Medications     Scheduled Medications    rivaroxaban  15 mg Oral BID WC    miconazole   Topical BID    oxyCODONE  10 mg Oral Y9G    folic acid  1 mg Oral Daily    fluconazole  100 mg Oral Daily    meropenem  1 g Intravenous Q8H    polyethylene glycol  17 g Oral Daily    sodium chloride flush  10 mL Intravenous 2 times per day    potassium chloride  10 mEq Oral BID    collagenase   Topical Daily    famotidine (PEPCID) injection  20 mg Intravenous BID     PRN Meds: sodium chloride flush, acetaminophen, ondansetron, morphine **OR** morphine      Intake/Output Summary (Last 24 hours) at 4/4/2019 1338  Last data filed at 4/4/2019 1328  Gross per 24 hour   Intake 2130 ml   Output 4100 ml   Net -1970 ml       Physical Exam Performed:    /69   Pulse 84   Temp 97.8 °F (36.6 °C) (Oral)   Resp 16   Ht 4' 10\" (1.473 m)   Wt 113 lb (51.3 kg)   SpO2 98%   BMI 23.62 kg/m²     General appearance: Pleasant, young, female in no apparent distress, appears stated age and cooperative. HEENT: Pupils equal, round, and reactive to light. Conjunctivae/corneas clear. Neck: Supple, with full range of motion. No jugular venous distention. Trachea midline. Respiratory:  Normal respiratory effort. Clear to auscultation, bilaterally without Rales/Wheezes/Rhonchi. Right Chest tube in place - no air leak.   Serous drainage noted in [facility]. Cardiovascular: Regular rate and rhythm with normal S1/S2 without rubs or gallops. 3/6 systolic murmur. Abdomen: Soft, non-tender. VAC dsg noted to lower abdomen. +ostomy with stool in bag. Musculoskeletal: No clubbing, cyanosis or edema bilaterally. Chronic deformities caused by paraplegia, no acute changes. Skin: Skin color, texture, turgor normal.  No rashes or lesions. Neurologic:  Patient is paraplegic. Lower extremity motor and sensory function is impaired on a chronic basis. No new focal changes. Psychiatric: Alert and oriented, thought content appropriate, normal insight  Capillary Refill: Brisk,< 3 seconds   Peripheral Pulses: +2 palpable, equal bilaterally       Labs:   Recent Labs     04/03/19  0615   WBC 8.9   HGB 8.2*   HCT 24.3*        Recent Labs     04/03/19  0615   *   K 4.3   CL 99   CO2 30   BUN 5*   CREATININE <0.5*   CALCIUM 7.8*     No results for input(s): AST, ALT, BILIDIR, BILITOT, ALKPHOS in the last 72 hours. No results for input(s): INR in the last 72 hours. No results for input(s): Kavitha Mari in the last 72 hours. Urinalysis:      Lab Results   Component Value Date    NITRU Negative 03/27/2019    WBCUA 3-5 03/20/2019    BACTERIA Rare 03/15/2019    RBCUA None seen 03/20/2019    BLOODU Negative 03/27/2019    SPECGRAV 1.010 03/27/2019    GLUCOSEU Negative 03/27/2019       Radiology:  XR CHEST PORTABLE   Final Result   Improved right pleural effusion. CT CHEST WO CONTRAST   Final Result   Decreased apparent multiloculated right pleural effusion status post pleural   catheter placement. The pleural catheter appears to been retracted several   cm. Careful attention is necessary to prevent further extraction. Small areas of nondependent air are seen as described but without significant   affect. Compressive collapse right lower lobe.       The right upper subphrenic trans pleural abscess drainage catheter is no   longer surrounded by remaining fluid. RECOMMENDATIONS:   Consider removal of the percutaneous abdominal right lateral subphrenic   drainage catheter. Careful attention to avoid further retraction of the recently placed   posteromedial left pleural catheter. The findings were sent to the Radiology Results [address] at 11:11   am on 4/2/2019to be communicated to a licensed caregiver. XR CHEST PORTABLE   Final Result   Resolved right pneumothorax         XR CHEST PORTABLE   Final Result   1. No significant change. XR CHEST PORTABLE   Final Result   Small to moderate right-sided pneumothorax with decreased pleural fluid. CT GUIDED PLEURAL DRAINAGE W CATH PERC   Final Result   Successful CT guided placement of a right chest tube         VL Extremity Venous Bilateral   Final Result      CT CHEST ABDOMEN PELVIS W CONTRAST   Final Result   Addendum 1 of 1   ADDENDUM:   Impression section should include:      Filling defect in the right internal iliac vein on series 3, image 111 is   again noted. Findings highly suspicious for thrombus. The findings were sent to the Radiology Results [address] at    12:18   pm on 3/28/2019to be communicated to a licensed caregiver. Final      XR CHEST PORTABLE   Final Result   Interval development of moderate partially loculated right pleural effusion. A the aerated right lung shows opacification compatible with atelectasis   and/or pneumonia. IR PICC WO SQ PORT/PUMP > 5 YEARS   Final Result   Successful placement of PICC line. CT ABDOMEN PELVIS W IV CONTRAST Additional Contrast? None   Final Result   Moderate residual abscess along the anterior hepatic margin, anterior to the   right lateral pigtail drainage catheter. Small amount of loculated fluid along the right pericolic gutter, extending   inferiorly to the pelvis, compatible with additional component of abscess.       Moderate right pleural effusion and trace left pleural effusion. Bibasilar   atelectasis. Anasarca. Nonocclusive thrombus versus mixing artifact within right internal iliac vein. Assessment/Plan:    Active Hospital Problems    Diagnosis Date Noted    Hydropneumothorax [J94.8] 03/30/2019    Pneumothorax [J93.9]     Pressure injury of sacral region, unstageable [facility]) [L89.150] 03/28/2019    Loculated pleural effusion [J90]     Abnormal CT scan, chest [R93.89]     Acute deep vein thrombosis (DVT) of non-extremity vein [I82.90]     Anemia [D64.9]     Rupture of operation wound [T81.31XA]     Moderate malnutrition ([address] [E44.0] 03/21/2019    Generalized abdominal pain [R10.84]     Post-operative nausea and vomiting [R11.2, Z98.890] 03/20/2019     PLAN:    Iliac vein thrombosis immobility, paraplegic, post op Block pouch for intra-abdominal abscess. Hx of DVT  - Changed to Xarelto from therapeutic Lovenox  - Patient understands the need for compliance and will need LIFELONG [facility]  - hem/onc consulted and following  - Factor 5 Leiden and prothrombin gene negative  - anti-phospholipids negative. - hematology consulted.     Pleural effusion, right, loculated   - Chest tube in place.    - CT chest completed 4/2 - loculated right pleural effusion noted. - s/p TPA 4/2.  - CXR 4/3 appears improved.       Intraabdominal abscess post sigmoid perforation  - IV meropenem (s 3/25)     Anemia  - No evidence of acute blood loss  - Most likely caused by chronic illness. - follow CBC.     Paraplegia  Patient is stable at baseline.     Abdominal wound   - wound VAC in place - changed 4/3.  - wound care RN following    DVT Prophylaxis: Xarelto  Diet: DIET LOW FIBER;  Code Status: Full Code    PT/OT Eval Status: planning for [facility] - per primary team     Estiven Loera, APRN - CNP
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
## 2019-04-04 NOTE — CARE COORDINATION
[address] [place] [address] can accept pt, likely discharge tomorrow. Writer updated pt, she is agreeable and states she will be ready tomorrow. For Friday:  Admissions coordinator is Collette Canner, cell: [phone]. Report: [phone]. Fax:  [phone].   PETER Curry-ASHLEY

## 2019-04-04 NOTE — PROGRESS NOTES
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 structure in the right abdomen which could represent a loop of bowel. However, this has been present on several prior studies and does not contain any oral contrast on the current study which raises the possibility of interloop abscess. Ct Guided Pleural Drainage W Cath Perc    Result Date: 3/29/2019  PROCEDURE: CT GUIDED CHEST TUBE PLACEMENT MODERATE CONSCIOUS SEDATION <Completed Date> HISTORY: ORDERING SYSTEM PROVIDED HISTORY: Loculated effusion, fever. Please place pigtail to suction, send studies ordered. Thank you TECHNOLOGIST PROVIDED HISTORY: Reason for exam:->Loculated effusion, fever. Please place pigtail to suction, send studies ordered. Thank you Ordering Physician Provided Reason for Exam: right sided chest tube placement Pneumothorax SEDATION: Moderate conscious sedation was administered for 10 minutes and monitored radiologist and nurse. TECHNIQUE: Informed consent was obtained after a detailed explanation of the procedure including risks, benefits, and alternatives. use.  Should quit smoking altogether. Subphrenic abscess, possible interloop abscess, wound dehiscence. Per surgery, drains and wound VAC. Does have low-grade fever. Right iliac vein thrombus. Anticoagulation after chest tube placed, reviewed by oncology, recommending Xarelto. Abdominal venous thrombosis would not be unexpected with intra abdominal inflammatory process, doubt hypercoagulable state  Anemia. Transfuse for Hb< 7g/dL. Leukocytosis. Was improving  Severe malnutrition. Poor by mouth intake has lost weight  DVT prophylaxis. On Lovenox. D/W patient.       Electronically signed by:  Karie James MD    4/4/2019    10:32 AM.

## 2019-04-05 PROCEDURE — 99024 POSTOP FOLLOW-UP VISIT: CPT | Performed by: SURGERY

## 2019-04-05 PROCEDURE — APPNB60 APP NON BILLABLE TIME 46-60 MINS: Performed by: CLINICAL NURSE SPECIALIST

## 2019-04-05 PROCEDURE — 99232 SBSQ HOSP IP/OBS MODERATE 35: CPT | Performed by: INTERNAL MEDICINE

## 2019-04-05 PROCEDURE — 6370000000 HC RX 637 (ALT 250 FOR IP): Performed by: SURGERY

## 2019-04-05 PROCEDURE — 6370000000 HC RX 637 (ALT 250 FOR IP): Performed by: NURSE PRACTITIONER

## 2019-04-05 PROCEDURE — 2580000003 HC RX 258: Performed by: SURGERY

## 2019-04-05 PROCEDURE — 6360000002 HC RX W HCPCS: Performed by: SURGERY

## 2019-04-05 PROCEDURE — APPSS30 APP SPLIT SHARED TIME 16-30 MINUTES: Performed by: CLINICAL NURSE SPECIALIST

## 2019-04-05 PROCEDURE — 1200000000 HC SEMI PRIVATE

## 2019-04-05 PROCEDURE — 6370000000 HC RX 637 (ALT 250 FOR IP): Performed by: INTERNAL MEDICINE

## 2019-04-05 PROCEDURE — 2500000003 HC RX 250 WO HCPCS: Performed by: SURGERY

## 2019-04-05 PROCEDURE — 6370000000 HC RX 637 (ALT 250 FOR IP): Performed by: OBSTETRICS & GYNECOLOGY

## 2019-04-05 RX ORDER — CASTOR OIL AND BALSAM, PERU 788; 87 MG/G; MG/G
OINTMENT TOPICAL 2 TIMES DAILY
Status: DISCONTINUED | OUTPATIENT
Start: 2019-04-05 | End: 2019-04-06 | Stop reason: HOSPADM

## 2019-04-05 RX ADMIN — FLUCONAZOLE 100 MG: 100 TABLET ORAL at 08:34

## 2019-04-05 RX ADMIN — Medication 10 ML: at 20:25

## 2019-04-05 RX ADMIN — MEROPENEM 1 G: 1 INJECTION, POWDER, FOR SOLUTION INTRAVENOUS at 15:20

## 2019-04-05 RX ADMIN — POTASSIUM CHLORIDE 10 MEQ: 750 TABLET, EXTENDED RELEASE ORAL at 08:35

## 2019-04-05 RX ADMIN — MORPHINE SULFATE 4 MG: 4 INJECTION INTRAVENOUS at 16:35

## 2019-04-05 RX ADMIN — OXYCODONE HYDROCHLORIDE 10 MG: 5 TABLET ORAL at 10:40

## 2019-04-05 RX ADMIN — OXYCODONE HYDROCHLORIDE 10 MG: 5 TABLET ORAL at 15:19

## 2019-04-05 RX ADMIN — Medication 10 ML: at 08:35

## 2019-04-05 RX ADMIN — OXYCODONE HYDROCHLORIDE 10 MG: 5 TABLET ORAL at 03:37

## 2019-04-05 RX ADMIN — POTASSIUM CHLORIDE 10 MEQ: 750 TABLET, EXTENDED RELEASE ORAL at 20:09

## 2019-04-05 RX ADMIN — RIVAROXABAN 15 MG: 15 TABLET, FILM COATED ORAL at 08:34

## 2019-04-05 RX ADMIN — RIVAROXABAN 15 MG: 15 TABLET, FILM COATED ORAL at 18:14

## 2019-04-05 RX ADMIN — CASTOR OIL AND BALSAM, PERU: 788; 87 OINTMENT TOPICAL at 20:09

## 2019-04-05 RX ADMIN — POLYETHYLENE GLYCOL 3350 17 G: 17 POWDER, FOR SOLUTION ORAL at 08:34

## 2019-04-05 RX ADMIN — MORPHINE SULFATE 4 MG: 4 INJECTION INTRAVENOUS at 12:03

## 2019-04-05 RX ADMIN — COLLAGENASE SANTYL: 250 OINTMENT TOPICAL at 13:42

## 2019-04-05 RX ADMIN — MEROPENEM 1 G: 1 INJECTION, POWDER, FOR SOLUTION INTRAVENOUS at 06:02

## 2019-04-05 RX ADMIN — MORPHINE SULFATE 4 MG: 4 INJECTION INTRAVENOUS at 05:37

## 2019-04-05 RX ADMIN — MICONAZOLE NITRATE: 2 POWDER TOPICAL at 20:09

## 2019-04-05 RX ADMIN — FAMOTIDINE 20 MG: 10 INJECTION, SOLUTION INTRAVENOUS at 20:09

## 2019-04-05 RX ADMIN — FOLIC ACID 1 MG: 1 TABLET ORAL at 08:34

## 2019-04-05 RX ADMIN — MORPHINE SULFATE 4 MG: 4 INJECTION INTRAVENOUS at 20:09

## 2019-04-05 RX ADMIN — CASTOR OIL AND BALSAM, PERU: 788; 87 OINTMENT TOPICAL at 18:14

## 2019-04-05 RX ADMIN — MICONAZOLE NITRATE: 2 POWDER TOPICAL at 13:42

## 2019-04-05 RX ADMIN — FAMOTIDINE 20 MG: 10 INJECTION, SOLUTION INTRAVENOUS at 08:34

## 2019-04-05 RX ADMIN — MORPHINE SULFATE 4 MG: 4 INJECTION INTRAVENOUS at 08:32

## 2019-04-05 RX ADMIN — OXYCODONE HYDROCHLORIDE 10 MG: 5 TABLET ORAL at 22:12

## 2019-04-05 ASSESSMENT — PAIN SCALES - GENERAL
PAINLEVEL_OUTOF10: 9
PAINLEVEL_OUTOF10: 9
PAINLEVEL_OUTOF10: 10
PAINLEVEL_OUTOF10: 10
PAINLEVEL_OUTOF10: 8
PAINLEVEL_OUTOF10: 9

## 2019-04-05 NOTE — PROGRESS NOTES
[facility] SURGERY    PATIENT NAME: Ezio Angulo     TODAY'S DATE: 4/5/2019    CHIEF COMPLAINT: doing okay, anxious for chest tube to be removed    INTERVAL HISTORY/HPI:    No further fever     Chest tube was not removed yesterday,     REVIEW OF SYSTEMS:  Pertinent positives and negatives as per interval history section    OBJECTIVE:  VITALS:  /69   Pulse 81   Temp 99.4 °F (37.4 °C) (Oral)   Resp 16   Ht 4' 10\" (1.473 m)   Wt 113 lb (51.3 kg)   SpO2 96%   BMI 23.62 kg/m²     INTAKE/OUTPUT:    I/O last 3 completed shifts: In: 0633 [P.O.:1210; I.V.:210]  Out: 2440 [Urine:1850; Stool:500; Chest Tube:90]  No intake/output data recorded. CONSTITUTIONAL:  awake and alert  LUNGS:  Respirations easy and unlabored, chest tube in place  CARD:  regular rate and rhythm  ABDOMEN:  normal bowel sounds, soft, non-distended, ostomy functional , wound vac in place     ASSESSMENT AND PLAN:  35 yo with history of jose rafael's procedure for diverticulitis, perc drains  Continue with diet as tolerated    abd wound: continue with wound vac - will need changed today if does not d/c, which does not look likely     Pleural effusion: chest tube was not removed yesterday and pt has gotten Xarelto yesterday and today unfortunately. IR states that they will not be able to remove tube until later this afternoon due to their schedule - however at that time, they were not aware that she had gotten Xarelto. Will need to clarify if they are still okay with removing in light of this. Heme: per heme/onc \"Will need to be on Xarelto (was on this previously and stopped due to non compliance). I told the patient she would need Xarelto LIFELONG to prevent a deadly blood clot. She seemed to understand.  Currently on treatment dose Lovenox- okay to continue and transition to Xarelto on discharge. Will need 15 mg PO BID X 21 days then 20 mg PO daily. \"    Moderate protein and calorie malnutrition: oral supplements    Pressure ulcers - all present on admission - Mid coccyx extending to right buttocks unstageable wound.  Right ischium DTI stage 2 pressure injury.  Left thigh blanchable pressure injury.  Right and left heels stage 1 pressure injury   continue with santyl to sacral wound and local wound care and encourage pt to turn    Dispo: [facility] is willing to accept pt, and had anticipated getting her today - now awaiting chest tube removal   Case management aware of situation - will update them about d/c date when able. Electronically signed by SHIRLEY Helton - CNP     [id number]    Patient seen and agree with above.     Luis Miguel Vásquez MD

## 2019-04-05 NOTE — CARE COORDINATION
This RN CM spoke to Amos Primrose, [place] with gen. surg. who states that the chest tube is removed and the Pt will plan to discharge tomorrow am.     This RN CM contacted Perry Salmon with [facility] and he is aware. He requests to be called tomorrow when she is discharging @ [phone]. Number for report is [phone] and fax is [phone]. VM left for weekend d/c planner to notify of tentative discharge.

## 2019-04-05 NOTE — CARE COORDINATION
RN BHARATH spoke with [facility], DEMARIO and Dr. Laura Cantu in general surgery about the Pt's status. Pt still with chest tube at this time. Pt was also started on xarelto yesterday, and received morning does this am. Pt's RN waiting on call back from IR at this time. Pt may not be able to dc today. Likely over the weekend. Will continue to follow for plan.

## 2019-04-05 NOTE — PROGRESS NOTES
Pt alert and oriented, VSS. Shift assessment completed and documented. Pt c/o back pain, PRN morphine given, see eMAR. Wound vac in place, working properly. Pt denies any further needs at this time. Bed locked and in lowest position, call light within reach. Will continue to monitor.

## 2019-04-05 NOTE — PROGRESS NOTES
Unstageable  · Current Nutrition Therapies:  · Oral Diet Orders: Low Fiber   · Oral Diet intake: 26-50%, %  · Oral Nutrition Supplement (ONS) Orders: None  · ONS intake: Refused, Unable to assess  · Anthropometric Measures:  · Ht: 4' 10\" (147.3 cm)   · Current Body Wt: 113 lb (51.3 kg)(stated)  · % Weight Change:  No weight change per EMR  · Ideal Body Wt: 96 lb (43.5 kg),   · BMI Classification: BMI 18.5 - 24.9 Normal Weight    Nutrition Interventions:   Continue current diet  Continued Inpatient Monitoring    Nutrition Evaluation:   · Evaluation: No progress toward goals   · Goals: Pt will consume 50% or more of meals this admisison.     · Monitoring: Nutrition Progression, Meal Intake, Pertinent Labs      Electronically signed by No Calderon on 4/5/19 at 10:24 AM    Contact Number: Office: [phone]; [address]: [phone]

## 2019-04-05 NOTE — PLAN OF CARE
Nutrition Problem: Increased nutrient needs(protein)  Intervention: Food and/or Nutrient Delivery: Continue current diet  Nutritional Goals: Pt will consume 50% or more of meals this admisison.
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
## 2019-04-05 NOTE — PROGRESS NOTES
Patient assessment complete and documented. VSS. A&O x4. Wound vac on mid abd. Chest tube to low wall suction. Vines draining appropriately. Patient resting in bed comfortably with no needs at this time. Bed in lowest locked position will continue to monitor.
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
## 2019-04-06 VITALS
DIASTOLIC BLOOD PRESSURE: 60 MMHG | OXYGEN SATURATION: 98 % | BODY MASS INDEX: 23.72 KG/M2 | RESPIRATION RATE: 16 BRPM | HEART RATE: 104 BPM | SYSTOLIC BLOOD PRESSURE: 96 MMHG | TEMPERATURE: 98.5 F | HEIGHT: 58 IN | WEIGHT: 113 LBS

## 2019-04-06 PROCEDURE — 2580000003 HC RX 258: Performed by: SURGERY

## 2019-04-06 PROCEDURE — 99238 HOSP IP/OBS DSCHRG MGMT 30/<: CPT | Performed by: SURGERY

## 2019-04-06 PROCEDURE — 6370000000 HC RX 637 (ALT 250 FOR IP): Performed by: SURGERY

## 2019-04-06 PROCEDURE — 6360000002 HC RX W HCPCS: Performed by: SURGERY

## 2019-04-06 PROCEDURE — 6370000000 HC RX 637 (ALT 250 FOR IP): Performed by: INTERNAL MEDICINE

## 2019-04-06 PROCEDURE — 2500000003 HC RX 250 WO HCPCS: Performed by: SURGERY

## 2019-04-06 PROCEDURE — 6370000000 HC RX 637 (ALT 250 FOR IP): Performed by: NURSE PRACTITIONER

## 2019-04-06 PROCEDURE — 99231 SBSQ HOSP IP/OBS SF/LOW 25: CPT | Performed by: INTERNAL MEDICINE

## 2019-04-06 RX ORDER — POTASSIUM CHLORIDE 750 MG/1
10 TABLET, EXTENDED RELEASE ORAL 2 TIMES DAILY
Qty: 60 TABLET | Refills: 3 | DISCHARGE
Start: 2019-04-06 | End: 2019-08-10

## 2019-04-06 RX ORDER — FOLIC ACID 1 MG/1
1 TABLET ORAL DAILY
Qty: 30 TABLET | Refills: 3 | DISCHARGE
Start: 2019-04-07 | End: 2019-08-10

## 2019-04-06 RX ORDER — CASTOR OIL AND BALSAM, PERU 788; 87 MG/G; MG/G
OINTMENT TOPICAL 2 TIMES DAILY
DISCHARGE
Start: 2019-04-06 | End: 2019-08-10

## 2019-04-06 RX ORDER — POLYETHYLENE GLYCOL 3350 17 G/17G
17 POWDER, FOR SOLUTION ORAL DAILY
Qty: 527 G | Refills: 1 | DISCHARGE
Start: 2019-04-07 | End: 2019-05-07

## 2019-04-06 RX ORDER — OXYCODONE HYDROCHLORIDE 10 MG/1
10 TABLET ORAL EVERY 6 HOURS
Qty: 20 TABLET | Refills: 0 | Status: SHIPPED | OUTPATIENT
Start: 2019-04-06 | End: 2019-04-09

## 2019-04-06 RX ADMIN — CASTOR OIL AND BALSAM, PERU: 788; 87 OINTMENT TOPICAL at 09:57

## 2019-04-06 RX ADMIN — MICONAZOLE NITRATE: 2 POWDER TOPICAL at 09:57

## 2019-04-06 RX ADMIN — OXYCODONE HYDROCHLORIDE 10 MG: 5 TABLET ORAL at 09:57

## 2019-04-06 RX ADMIN — FOLIC ACID 1 MG: 1 TABLET ORAL at 09:56

## 2019-04-06 RX ADMIN — Medication 10 ML: at 09:59

## 2019-04-06 RX ADMIN — OXYCODONE HYDROCHLORIDE 10 MG: 5 TABLET ORAL at 15:45

## 2019-04-06 RX ADMIN — MORPHINE SULFATE 4 MG: 4 INJECTION INTRAVENOUS at 11:20

## 2019-04-06 RX ADMIN — MORPHINE SULFATE 4 MG: 4 INJECTION INTRAVENOUS at 14:10

## 2019-04-06 RX ADMIN — ACETAMINOPHEN 650 MG: 325 TABLET ORAL at 00:02

## 2019-04-06 RX ADMIN — OXYCODONE HYDROCHLORIDE 10 MG: 5 TABLET ORAL at 03:32

## 2019-04-06 RX ADMIN — COLLAGENASE SANTYL: 250 OINTMENT TOPICAL at 09:57

## 2019-04-06 RX ADMIN — POTASSIUM CHLORIDE 10 MEQ: 750 TABLET, EXTENDED RELEASE ORAL at 09:56

## 2019-04-06 RX ADMIN — RIVAROXABAN 15 MG: 15 TABLET, FILM COATED ORAL at 17:26

## 2019-04-06 RX ADMIN — FAMOTIDINE 20 MG: 10 INJECTION, SOLUTION INTRAVENOUS at 09:56

## 2019-04-06 RX ADMIN — MORPHINE SULFATE 4 MG: 4 INJECTION INTRAVENOUS at 00:44

## 2019-04-06 RX ADMIN — MORPHINE SULFATE 4 MG: 4 INJECTION INTRAVENOUS at 16:52

## 2019-04-06 RX ADMIN — MORPHINE SULFATE 4 MG: 4 INJECTION INTRAVENOUS at 08:49

## 2019-04-06 RX ADMIN — MORPHINE SULFATE 4 MG: 4 INJECTION INTRAVENOUS at 06:46

## 2019-04-06 RX ADMIN — POLYETHYLENE GLYCOL 3350 17 G: 17 POWDER, FOR SOLUTION ORAL at 09:56

## 2019-04-06 RX ADMIN — RIVAROXABAN 15 MG: 15 TABLET, FILM COATED ORAL at 09:56

## 2019-04-06 ASSESSMENT — PAIN SCALES - GENERAL
PAINLEVEL_OUTOF10: 8
PAINLEVEL_OUTOF10: 9
PAINLEVEL_OUTOF10: 10
PAINLEVEL_OUTOF10: 9
PAINLEVEL_OUTOF10: 9
PAINLEVEL_OUTOF10: 10
PAINLEVEL_OUTOF10: 8
PAINLEVEL_OUTOF10: 8

## 2019-04-06 NOTE — DISCHARGE SUMMARY
Surgery Discharge Summary    Patient Identification  Rosa Baron is a 34 y.o. female. :  1990  Admit Date:  3/20/2019    Discharge date:   2019                                  Disposition: long term care facility    Discharge Diagnoses: Active Problems: Moderate malnutrition (HCC)    Post-operative nausea and vomiting    Generalized abdominal pain    Pressure injury of sacral region, unstageable (HCC)    Loculated pleural effusion    Abnormal CT scan, chest    Acute deep vein thrombosis (DVT) of non-extremity vein    Anemia    Rupture of operation wound    Pneumothorax    Hydropneumothorax  Resolved Problems:    * No resolved hospital problems. *      Discharge condition: fair    Discharge Medications:     Current Discharge Medication List      CONTINUE these medications which have NOT CHANGED    Details   BACLOFEN, PAIN PUMP REFILL CHARGE,                 Current Discharge Medication List            Most Recent Labs:    CBC: No results for input(s): WBC, HGB, HCT, PLT in the last 72 hours. BMP:  No results for input(s): NA, K, CL, CO2, BUN, CREATININE, GLUCOSE in the last 72 hours. Hepatic: No results for input(s): AST, ALT, ALB, BILITOT, ALKPHOS in the last 72 hours. PT/INR:  No results for input(s): INR in the last 72 hours. Consults: pulmonary/intensive care and hematology/oncology    Surgery: none    Patient Instructions: Activity: no heavy lifting, pushing, pulling for 6 weeks, no driving for 2 weeks or while on analgesics  Diet: As tolerated  Follow-up with Dr Flaco Carranza in 2-4 weeks. The patient and/or family/patient representatives, were provided education regarding discharge instructions, ongoing treatment and follow-up. Details of information given to the patient may be found in the discharge instructions located in the EMR. HPI and Hospital Course:   Pt readmitted from home w/ increased N/V and wound drainage. Still had abdominal drains to abscesses. Admitted, underwent continued wound care w/ Vac, eventually had abdominal drains removed. Developed LLE DVT, started on Eliquis. Also w/ R pleural effusion managed w/ perc chest tube, resolved. Now for transfer to [facility] for continued care.       Reinaldo Sawant [facility] Surgery

## 2019-04-06 NOTE — CARE COORDINATION
CASE MANAGEMENT DISCHARGE SUMMARY      Discharge to: [facility] completed: [facility] Exemption Notification (HENS) completed: N/A    New Durable Medical Equipment ordered/agency: per facility    Transportation:    Family/car: no   Medical Transport/ time: [facility] @ 1700   Ambulance form completed: Yes    Notified:    Family: yes   Facility/Agency: [facility] faxed   RN: yes   Phone number for report to facility: [phone]    Note: Discharging nurse to complete [facility], reconcile [facility], and place final copy with patient's discharge packet. RN to ensure that written prescriptions for  Level II medications are sent with patient to the facility as per protocol.

## 2019-04-06 NOTE — PROGRESS NOTES
Assessment completed and documented. VSS. A/ox4. C/o of pain 8/10 where the chest tube was, given PRN pain medication per [facility]. Q2turn with wedge support. PICC line locked. Denies further needs at this time. Bed locked and in lowest position. Bedside table and call light within reach. Will continue to monitor.

## 2019-04-06 NOTE — PROGRESS NOTES
Wound care orders done per order. Wound vac removed, wet to dry placed. Pain controlled throughout shift with scheduled and PRN pain medication. Turn pt q2 hours. Will continue to monitor.
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
## 2019-04-06 NOTE — PROGRESS NOTES
Pt discharged per order. Report called to nurse at [address] Wound vac removed prior to d/c per order, wet to dry in place. Pt left with all belongings. Prescription for Akila sealed and given to transport. Pt left via transport in stable condition.

## 2019-08-10 ENCOUNTER — HOSPITAL ENCOUNTER (EMERGENCY)
Age: 29
Discharge: HOME | End: 2019-08-10
Attending: EMERGENCY MEDICINE
Payer: MEDICARE

## 2019-08-10 ENCOUNTER — APPOINTMENT (OUTPATIENT)
Dept: CT IMAGING | Age: 29
End: 2019-08-10
Payer: MEDICARE

## 2019-08-10 VITALS
OXYGEN SATURATION: 100 % | HEART RATE: 100 BPM | SYSTOLIC BLOOD PRESSURE: 101 MMHG | BODY MASS INDEX: 20.9 KG/M2 | RESPIRATION RATE: 18 BRPM | TEMPERATURE: 98.2 F | WEIGHT: 100 LBS | DIASTOLIC BLOOD PRESSURE: 56 MMHG

## 2019-08-10 DIAGNOSIS — R10.30 LOWER ABDOMINAL PAIN: Primary | ICD-10-CM
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
## 2019-08-10 PROCEDURE — 80053 COMPREHEN METABOLIC PANEL: CPT

## 2019-08-10 PROCEDURE — 99284 EMERGENCY DEPT VISIT MOD MDM: CPT

## 2019-08-10 PROCEDURE — 2580000003 HC RX 258: Performed by: EMERGENCY MEDICINE

## 2019-08-10 PROCEDURE — 6360000004 HC RX CONTRAST MEDICATION: Performed by: EMERGENCY MEDICINE

## 2019-08-10 PROCEDURE — 84703 CHORIONIC GONADOTROPIN ASSAY: CPT

## 2019-08-10 PROCEDURE — 74177 CT ABD & PELVIS W/CONTRAST: CPT

## 2019-08-10 PROCEDURE — 85025 COMPLETE CBC W/AUTO DIFF WBC: CPT

## 2019-08-10 RX ORDER — 0.9 % SODIUM CHLORIDE 0.9 %
1000 INTRAVENOUS SOLUTION INTRAVENOUS ONCE
Status: COMPLETED | OUTPATIENT
Start: 2019-08-10 | End: 2019-08-10

## 2019-08-10 RX ADMIN — SODIUM CHLORIDE 1000 ML: 9 INJECTION, SOLUTION INTRAVENOUS at 20:43

## 2019-08-10 RX ADMIN — IOPAMIDOL 75 ML: 755 INJECTION, SOLUTION INTRAVENOUS at 22:19

## 2019-08-10 ASSESSMENT — PAIN DESCRIPTION - PAIN TYPE: TYPE: ACUTE PAIN

## 2019-08-10 ASSESSMENT — PAIN DESCRIPTION - LOCATION: LOCATION: ABDOMEN

## 2019-08-10 ASSESSMENT — PAIN SCALES - GENERAL: PAINLEVEL_OUTOF10: 3
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
## 2019-08-11 LAB
A/G RATIO: 0.7 (ref 1.1–2.2)
ALBUMIN SERPL-MCNC: 3.7 G/DL (ref 3.4–5)
ALP BLD-CCNC: 81 U/L (ref 40–129)
ALT SERPL-CCNC: 11 U/L (ref 10–40)
ANION GAP SERPL CALCULATED.3IONS-SCNC: 10 MMOL/L (ref 3–16)
AST SERPL-CCNC: 20 U/L (ref 15–37)
BASOPHILS ABSOLUTE: 0.1 K/UL (ref 0–0.2)
BASOPHILS RELATIVE PERCENT: 0.6 %
BILIRUB SERPL-MCNC: <0.2 MG/DL (ref 0–1)
BUN BLDV-MCNC: 17 MG/DL (ref 7–20)
CALCIUM SERPL-MCNC: 9.5 MG/DL (ref 8.3–10.6)
CHLORIDE BLD-SCNC: 100 MMOL/L (ref 99–110)
CO2: 26 MMOL/L (ref 21–32)
CREAT SERPL-MCNC: <0.5 MG/DL (ref 0.6–1.1)
EOSINOPHILS ABSOLUTE: 0.3 K/UL (ref 0–0.6)
EOSINOPHILS RELATIVE PERCENT: 3.2 %
GFR AFRICAN AMERICAN: >60
GFR NON-AFRICAN AMERICAN: >60
GLOBULIN: 5.5 G/DL
GLUCOSE BLD-MCNC: 104 MG/DL (ref 70–99)
HCG QUALITATIVE: NEGATIVE
HCT VFR BLD CALC: 40.5 % (ref 36–48)
HEMOGLOBIN: 13.6 G/DL (ref 12–16)
LYMPHOCYTES ABSOLUTE: 2.6 K/UL (ref 1–5.1)
LYMPHOCYTES RELATIVE PERCENT: 26 %
MCH RBC QN AUTO: 29.7 PG (ref 26–34)
MCHC RBC AUTO-ENTMCNC: 33.4 G/DL (ref 31–36)
MCV RBC AUTO: 88.9 FL (ref 80–100)
MONOCYTES ABSOLUTE: 0.7 K/UL (ref 0–1.3)
MONOCYTES RELATIVE PERCENT: 7 %
NEUTROPHILS ABSOLUTE: 6.4 K/UL (ref 1.7–7.7)
NEUTROPHILS RELATIVE PERCENT: 63.2 %
PDW BLD-RTO: 16.3 % (ref 12.4–15.4)
PLATELET # BLD: 245 K/UL (ref 135–450)
PMV BLD AUTO: 8.8 FL (ref 5–10.5)
POTASSIUM SERPL-SCNC: 4.7 MMOL/L (ref 3.5–5.1)
RBC # BLD: 4.56 M/UL (ref 4–5.2)
SODIUM BLD-SCNC: 136 MMOL/L (ref 136–145)
TOTAL PROTEIN: 9.2 G/DL (ref 6.4–8.2)
WBC # BLD: 10.2 K/UL (ref 4–11)

## 2021-01-11 ENCOUNTER — HOSPITAL ENCOUNTER (OUTPATIENT)
Age: 31
Setting detail: SPECIMEN
Discharge: HOME | End: 2021-01-11
Payer: MEDICARE

## 2021-01-11 PROCEDURE — G0480 DRUG TEST DEF 1-7 CLASSES: HCPCS

## 2021-01-15 LAB
3-OH-COTININE URINE: 3648 NG/ML
ANABASINE URINE: 8 NG/ML
COTININE, URINE: 1058 NG/ML
NICOTINE URINE: 504 NG/ML
NORNICOTINE URINE: 42 NG/ML

## 2021-04-26 ENCOUNTER — HOSPITAL ENCOUNTER (EMERGENCY)
Age: 31
Discharge: HOME | End: 2021-04-26
Payer: MEDICARE

## 2021-04-26 VITALS
DIASTOLIC BLOOD PRESSURE: 71 MMHG | RESPIRATION RATE: 16 BRPM | HEIGHT: 58 IN | TEMPERATURE: 97.9 F | WEIGHT: 104 LBS | HEART RATE: 70 BPM | SYSTOLIC BLOOD PRESSURE: 109 MMHG | BODY MASS INDEX: 21.83 KG/M2 | OXYGEN SATURATION: 98 %

## 2021-04-26 DIAGNOSIS — L73.9 FOLLICULITIS: ICD-10-CM

## 2021-04-26 DIAGNOSIS — L30.9 DERMATITIS: Primary | ICD-10-CM

## 2021-04-26 PROCEDURE — 99284 EMERGENCY DEPT VISIT MOD MDM: CPT

## 2021-04-26 RX ORDER — DOXYCYCLINE HYCLATE 100 MG
100 TABLET ORAL 2 TIMES DAILY
Qty: 14 TABLET | Refills: 0 | Status: SHIPPED | OUTPATIENT
Start: 2021-04-26 | End: 2021-05-03

## 2021-04-26 RX ORDER — PREDNISONE 20 MG/1
20 TABLET ORAL DAILY
Qty: 3 TABLET | Refills: 0 | Status: SHIPPED | OUTPATIENT
Start: 2021-04-26 | End: 2021-04-29

## 2021-04-26 ASSESSMENT — ENCOUNTER SYMPTOMS
WHEEZING: 0
VOMITING: 0
SHORTNESS OF BREATH: 0
THROAT SWELLING: 0
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
## 2022-09-28 ENCOUNTER — APPOINTMENT (OUTPATIENT)
Dept: CT IMAGING | Age: 32
End: 2022-09-28
Payer: COMMERCIAL

## 2022-09-28 ENCOUNTER — HOSPITAL ENCOUNTER (EMERGENCY)
Age: 32
Discharge: HOME | End: 2022-09-28
Payer: COMMERCIAL

## 2022-09-28 VITALS
HEIGHT: 58 IN | SYSTOLIC BLOOD PRESSURE: 98 MMHG | TEMPERATURE: 98.3 F | RESPIRATION RATE: 16 BRPM | WEIGHT: 90 LBS | OXYGEN SATURATION: 100 % | HEART RATE: 64 BPM | BODY MASS INDEX: 18.89 KG/M2 | DIASTOLIC BLOOD PRESSURE: 82 MMHG

## 2022-09-28 DIAGNOSIS — N39.0 URINARY TRACT INFECTION WITH HEMATURIA, SITE UNSPECIFIED: Primary | ICD-10-CM

## 2022-09-28 DIAGNOSIS — R31.9 URINARY TRACT INFECTION WITH HEMATURIA, SITE UNSPECIFIED: Primary | ICD-10-CM

## 2022-09-28 DIAGNOSIS — R10.2 SUPRAPUBIC ABDOMINAL PAIN: ICD-10-CM

## 2022-09-28 LAB
A/G RATIO: 0.9 (ref 1.1–2.2)
ALBUMIN SERPL-MCNC: 3.9 G/DL (ref 3.4–5)
ALP BLD-CCNC: 80 U/L (ref 40–129)
ALT SERPL-CCNC: 12 U/L (ref 10–40)
ANION GAP SERPL CALCULATED.3IONS-SCNC: 9 MMOL/L (ref 3–16)
AST SERPL-CCNC: 16 U/L (ref 15–37)
BACTERIA: ABNORMAL /HPF
BASOPHILS ABSOLUTE: 0 K/UL (ref 0–0.2)
BASOPHILS RELATIVE PERCENT: 0.4 %
BILIRUB SERPL-MCNC: 0.3 MG/DL (ref 0–1)
BILIRUBIN URINE: NEGATIVE
BLOOD, URINE: ABNORMAL
BUN BLDV-MCNC: 14 MG/DL (ref 7–20)
CALCIUM SERPL-MCNC: 9.3 MG/DL (ref 8.3–10.6)
CHLORIDE BLD-SCNC: 103 MMOL/L (ref 99–110)
CLARITY: ABNORMAL
CO2: 28 MMOL/L (ref 21–32)
COLOR: YELLOW
CREAT SERPL-MCNC: <0.5 MG/DL (ref 0.6–1.1)
EOSINOPHILS ABSOLUTE: 0.3 K/UL (ref 0–0.6)
EOSINOPHILS RELATIVE PERCENT: 3.3 %
GFR AFRICAN AMERICAN: >60
GFR NON-AFRICAN AMERICAN: >60
GLUCOSE BLD-MCNC: 79 MG/DL (ref 70–99)
GLUCOSE URINE: NEGATIVE MG/DL
HCG QUALITATIVE: NEGATIVE
HCT VFR BLD CALC: 44.6 % (ref 36–48)
HEMOGLOBIN: 14.7 G/DL (ref 12–16)
KETONES, URINE: NEGATIVE MG/DL
LEUKOCYTE ESTERASE, URINE: ABNORMAL
LYMPHOCYTES ABSOLUTE: 3.3 K/UL (ref 1–5.1)
LYMPHOCYTES RELATIVE PERCENT: 33.2 %
MAGNESIUM: 2.1 MG/DL (ref 1.8–2.4)
MCH RBC QN AUTO: 31.5 PG (ref 26–34)
MCHC RBC AUTO-ENTMCNC: 32.9 G/DL (ref 31–36)
MCV RBC AUTO: 95.7 FL (ref 80–100)
MICROSCOPIC EXAMINATION: YES
MONOCYTES ABSOLUTE: 0.7 K/UL (ref 0–1.3)
MONOCYTES RELATIVE PERCENT: 7.1 %
NEUTROPHILS ABSOLUTE: 5.5 K/UL (ref 1.7–7.7)
NEUTROPHILS RELATIVE PERCENT: 56 %
NITRITE, URINE: POSITIVE
PDW BLD-RTO: 13.9 % (ref 12.4–15.4)
PH UA: 6 (ref 5–8)
PLATELET # BLD: 217 K/UL (ref 135–450)
PMV BLD AUTO: 9.2 FL (ref 5–10.5)
POTASSIUM REFLEX MAGNESIUM: 3.5 MMOL/L (ref 3.5–5.1)
PROTEIN UA: 100 MG/DL
RBC # BLD: 4.66 M/UL (ref 4–5.2)
RBC UA: ABNORMAL /HPF (ref 0–4)
SODIUM BLD-SCNC: 140 MMOL/L (ref 136–145)
SPECIFIC GRAVITY UA: 1.02 (ref 1–1.03)
TOTAL PROTEIN: 8.2 G/DL (ref 6.4–8.2)
URINE REFLEX TO CULTURE: YES
URINE TYPE: ABNORMAL
UROBILINOGEN, URINE: 1 E.U./DL
WBC # BLD: 9.9 K/UL (ref 4–11)
WBC UA: >100 /HPF (ref 0–5)

## 2022-09-28 PROCEDURE — 87086 URINE CULTURE/COLONY COUNT: CPT

## 2022-09-28 PROCEDURE — 83735 ASSAY OF MAGNESIUM: CPT

## 2022-09-28 PROCEDURE — 80053 COMPREHEN METABOLIC PANEL: CPT

## 2022-09-28 PROCEDURE — 84703 CHORIONIC GONADOTROPIN ASSAY: CPT

## 2022-09-28 PROCEDURE — 99284 EMERGENCY DEPT VISIT MOD MDM: CPT

## 2022-09-28 PROCEDURE — 96375 TX/PRO/DX INJ NEW DRUG ADDON: CPT

## 2022-09-28 PROCEDURE — 6360000002 HC RX W HCPCS: Performed by: PHYSICIAN ASSISTANT

## 2022-09-28 PROCEDURE — 85025 COMPLETE CBC W/AUTO DIFF WBC: CPT

## 2022-09-28 PROCEDURE — 87088 URINE BACTERIA CULTURE: CPT

## 2022-09-28 PROCEDURE — 81001 URINALYSIS AUTO W/SCOPE: CPT

## 2022-09-28 PROCEDURE — 2580000003 HC RX 258: Performed by: PHYSICIAN ASSISTANT

## 2022-09-28 PROCEDURE — 96365 THER/PROPH/DIAG IV INF INIT: CPT

## 2022-09-28 PROCEDURE — 74176 CT ABD & PELVIS W/O CONTRAST: CPT

## 2022-09-28 PROCEDURE — 87186 SC STD MICRODIL/AGAR DIL: CPT

## 2022-09-28 RX ORDER — NAPROXEN 500 MG/1
500 TABLET ORAL 2 TIMES DAILY PRN
Qty: 20 TABLET | Refills: 0 | Status: ON HOLD | OUTPATIENT
Start: 2022-09-28 | End: 2022-10-06 | Stop reason: HOSPADM

## 2022-09-28 RX ORDER — ONDANSETRON 2 MG/ML
4 INJECTION INTRAMUSCULAR; INTRAVENOUS ONCE
Status: COMPLETED | OUTPATIENT
Start: 2022-09-28 | End: 2022-09-28

## 2022-09-28 RX ORDER — KETOROLAC TROMETHAMINE 30 MG/ML
15 INJECTION, SOLUTION INTRAMUSCULAR; INTRAVENOUS ONCE
Status: COMPLETED | OUTPATIENT
Start: 2022-09-28 | End: 2022-09-28

## 2022-09-28 RX ORDER — ONDANSETRON 4 MG/1
4 TABLET, ORALLY DISINTEGRATING ORAL 3 TIMES DAILY PRN
Qty: 21 TABLET | Refills: 0 | Status: ON HOLD | OUTPATIENT
Start: 2022-09-28 | End: 2022-10-06 | Stop reason: SDUPTHER

## 2022-09-28 RX ORDER — SULFAMETHOXAZOLE AND TRIMETHOPRIM 800; 160 MG/1; MG/1
1 TABLET ORAL 2 TIMES DAILY
Qty: 14 TABLET | Refills: 0 | Status: ON HOLD | OUTPATIENT
Start: 2022-09-28 | End: 2022-10-06 | Stop reason: HOSPADM

## 2022-09-28 RX ORDER — SODIUM CHLORIDE, SODIUM LACTATE, POTASSIUM CHLORIDE, AND CALCIUM CHLORIDE .6; .31; .03; .02 G/100ML; G/100ML; G/100ML; G/100ML
1000 INJECTION, SOLUTION INTRAVENOUS ONCE
Status: COMPLETED | OUTPATIENT
Start: 2022-09-28 | End: 2022-09-28

## 2022-09-28 RX ORDER — MEDROXYPROGESTERONE ACETATE 10 MG/1
10 TABLET ORAL DAILY
Qty: 10 TABLET | Refills: 0 | Status: SHIPPED | OUTPATIENT
Start: 2022-09-28 | End: 2022-09-28 | Stop reason: CLARIF

## 2022-09-28 RX ADMIN — SODIUM CHLORIDE, POTASSIUM CHLORIDE, SODIUM LACTATE AND CALCIUM CHLORIDE 1000 ML: 600; 310; 30; 20 INJECTION, SOLUTION INTRAVENOUS at 14:44

## 2022-09-28 RX ADMIN — ONDANSETRON 4 MG: 2 INJECTION INTRAMUSCULAR; INTRAVENOUS at 14:40

## 2022-09-28 RX ADMIN — KETOROLAC TROMETHAMINE 15 MG: 30 INJECTION, SOLUTION INTRAMUSCULAR at 14:41

## 2022-09-28 RX ADMIN — CEFTRIAXONE SODIUM 1000 MG: 1 INJECTION, POWDER, FOR SOLUTION INTRAMUSCULAR; INTRAVENOUS at 15:48

## 2022-09-28 ASSESSMENT — PAIN SCALES - GENERAL
PAINLEVEL_OUTOF10: 0
PAINLEVEL_OUTOF10: 6
PAINLEVEL_OUTOF10: 6

## 2022-09-28 ASSESSMENT — PAIN - FUNCTIONAL ASSESSMENT: PAIN_FUNCTIONAL_ASSESSMENT: 0-10

## 2022-09-28 ASSESSMENT — PAIN DESCRIPTION - FREQUENCY: FREQUENCY: CONTINUOUS

## 2022-09-28 ASSESSMENT — PAIN DESCRIPTION - LOCATION: LOCATION: ABDOMEN

## 2022-09-28 ASSESSMENT — PAIN DESCRIPTION - ORIENTATION: ORIENTATION: LOWER

## 2022-09-28 ASSESSMENT — PAIN DESCRIPTION - DESCRIPTORS: DESCRIPTORS: SHARP

## 2022-09-28 ASSESSMENT — PAIN DESCRIPTION - PAIN TYPE: TYPE: ACUTE PAIN

## 2022-09-28 NOTE — ED NOTES
Discharge instructions reviewed with Pt. Pt verbalizes understanding at this time. Prescriptions/medications reviewed with pt at this time. VS as noted. Pt condition stable at this time. No concerns voiced.       Val Horn RN  09/28/22 5972
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
## 2022-09-30 LAB
ORGANISM: ABNORMAL
URINE CULTURE, ROUTINE: ABNORMAL

## 2022-10-04 ENCOUNTER — HOSPITAL ENCOUNTER (OUTPATIENT)
Age: 32
Setting detail: OBSERVATION
Discharge: HOME | End: 2022-10-06
Attending: EMERGENCY MEDICINE | Admitting: INTERNAL MEDICINE
Payer: COMMERCIAL

## 2022-10-04 ENCOUNTER — APPOINTMENT (OUTPATIENT)
Dept: CT IMAGING | Age: 32
End: 2022-10-04
Payer: COMMERCIAL

## 2022-10-04 DIAGNOSIS — R52 INTRACTABLE PAIN: ICD-10-CM

## 2022-10-04 DIAGNOSIS — R10.9 FLANK PAIN: Primary | ICD-10-CM

## 2022-10-04 DIAGNOSIS — R10.9 FLANK PAIN, ACUTE: ICD-10-CM

## 2022-10-04 LAB
A/G RATIO: 0.8 (ref 1.1–2.2)
ALBUMIN SERPL-MCNC: 3.7 G/DL (ref 3.4–5)
ALP BLD-CCNC: 66 U/L (ref 40–129)
ALT SERPL-CCNC: 10 U/L (ref 10–40)
ANION GAP SERPL CALCULATED.3IONS-SCNC: 11 MMOL/L (ref 3–16)
AST SERPL-CCNC: 17 U/L (ref 15–37)
BASOPHILS ABSOLUTE: 0.1 K/UL (ref 0–0.2)
BASOPHILS RELATIVE PERCENT: 1 %
BILIRUB SERPL-MCNC: <0.2 MG/DL (ref 0–1)
BILIRUBIN URINE: NEGATIVE
BLOOD, URINE: NEGATIVE
BUN BLDV-MCNC: 18 MG/DL (ref 7–20)
CALCIUM SERPL-MCNC: 9.1 MG/DL (ref 8.3–10.6)
CHLORIDE BLD-SCNC: 98 MMOL/L (ref 99–110)
CLARITY: CLEAR
CO2: 27 MMOL/L (ref 21–32)
COLOR: YELLOW
CREAT SERPL-MCNC: 0.6 MG/DL (ref 0.6–1.1)
EOSINOPHILS ABSOLUTE: 0.3 K/UL (ref 0–0.6)
EOSINOPHILS RELATIVE PERCENT: 3.7 %
GFR AFRICAN AMERICAN: >60
GFR NON-AFRICAN AMERICAN: >60
GLUCOSE BLD-MCNC: 77 MG/DL (ref 70–99)
GLUCOSE URINE: NEGATIVE MG/DL
HCG QUALITATIVE: NEGATIVE
HCT VFR BLD CALC: 41.5 % (ref 36–48)
HEMOGLOBIN: 13.9 G/DL (ref 12–16)
KETONES, URINE: NEGATIVE MG/DL
LACTIC ACID: 0.7 MMOL/L (ref 0.4–2)
LEUKOCYTE ESTERASE, URINE: NEGATIVE
LIPASE: 42 U/L (ref 13–60)
LYMPHOCYTES ABSOLUTE: 3.6 K/UL (ref 1–5.1)
LYMPHOCYTES RELATIVE PERCENT: 40.4 %
MCH RBC QN AUTO: 32.1 PG (ref 26–34)
MCHC RBC AUTO-ENTMCNC: 33.6 G/DL (ref 31–36)
MCV RBC AUTO: 95.6 FL (ref 80–100)
MICROSCOPIC EXAMINATION: NORMAL
MONOCYTES ABSOLUTE: 0.7 K/UL (ref 0–1.3)
MONOCYTES RELATIVE PERCENT: 7.7 %
NEUTROPHILS ABSOLUTE: 4.2 K/UL (ref 1.7–7.7)
NEUTROPHILS RELATIVE PERCENT: 47.2 %
NITRITE, URINE: NEGATIVE
PDW BLD-RTO: 14.1 % (ref 12.4–15.4)
PH UA: 6 (ref 5–8)
PLATELET # BLD: 213 K/UL (ref 135–450)
PMV BLD AUTO: 9.5 FL (ref 5–10.5)
POTASSIUM SERPL-SCNC: 4.5 MMOL/L (ref 3.5–5.1)
PROTEIN UA: NEGATIVE MG/DL
RBC # BLD: 4.34 M/UL (ref 4–5.2)
SODIUM BLD-SCNC: 136 MMOL/L (ref 136–145)
SPECIFIC GRAVITY UA: >=1.03 (ref 1–1.03)
TOTAL PROTEIN: 8.1 G/DL (ref 6.4–8.2)
URINE REFLEX TO CULTURE: NORMAL
URINE TYPE: NORMAL
UROBILINOGEN, URINE: 0.2 E.U./DL
WBC # BLD: 8.8 K/UL (ref 4–11)

## 2022-10-04 PROCEDURE — 6360000002 HC RX W HCPCS: Performed by: NURSE PRACTITIONER

## 2022-10-04 PROCEDURE — 83605 ASSAY OF LACTIC ACID: CPT

## 2022-10-04 PROCEDURE — 99285 EMERGENCY DEPT VISIT HI MDM: CPT

## 2022-10-04 PROCEDURE — 84703 CHORIONIC GONADOTROPIN ASSAY: CPT

## 2022-10-04 PROCEDURE — 83690 ASSAY OF LIPASE: CPT

## 2022-10-04 PROCEDURE — 6360000004 HC RX CONTRAST MEDICATION: Performed by: NURSE PRACTITIONER

## 2022-10-04 PROCEDURE — 85025 COMPLETE CBC W/AUTO DIFF WBC: CPT

## 2022-10-04 PROCEDURE — 96374 THER/PROPH/DIAG INJ IV PUSH: CPT

## 2022-10-04 PROCEDURE — 74177 CT ABD & PELVIS W/CONTRAST: CPT

## 2022-10-04 PROCEDURE — 80053 COMPREHEN METABOLIC PANEL: CPT

## 2022-10-04 PROCEDURE — 81003 URINALYSIS AUTO W/O SCOPE: CPT

## 2022-10-04 RX ORDER — KETOROLAC TROMETHAMINE 30 MG/ML
15 INJECTION, SOLUTION INTRAMUSCULAR; INTRAVENOUS ONCE
Status: COMPLETED | OUTPATIENT
Start: 2022-10-04 | End: 2022-10-05

## 2022-10-04 RX ORDER — MORPHINE SULFATE 4 MG/ML
4 INJECTION, SOLUTION INTRAMUSCULAR; INTRAVENOUS
Status: COMPLETED | OUTPATIENT
Start: 2022-10-04 | End: 2022-10-05

## 2022-10-04 RX ADMIN — IOPAMIDOL 75 ML: 755 INJECTION, SOLUTION INTRAVENOUS at 21:42

## 2022-10-04 RX ADMIN — MORPHINE SULFATE 4 MG: 4 INJECTION, SOLUTION INTRAMUSCULAR; INTRAVENOUS at 21:00

## 2022-10-04 ASSESSMENT — PAIN DESCRIPTION - LOCATION: LOCATION: FLANK

## 2022-10-04 ASSESSMENT — PAIN SCALES - GENERAL
PAINLEVEL_OUTOF10: 7
PAINLEVEL_OUTOF10: 10

## 2022-10-04 ASSESSMENT — PAIN DESCRIPTION - ORIENTATION: ORIENTATION: RIGHT

## 2022-10-04 ASSESSMENT — PAIN - FUNCTIONAL ASSESSMENT: PAIN_FUNCTIONAL_ASSESSMENT: 0-10

## 2022-10-05 ENCOUNTER — APPOINTMENT (OUTPATIENT)
Dept: ULTRASOUND IMAGING | Age: 32
End: 2022-10-05
Payer: COMMERCIAL

## 2022-10-05 PROBLEM — R10.9 FLANK PAIN, ACUTE: Status: ACTIVE | Noted: 2022-10-05

## 2022-10-05 LAB
A/G RATIO: 0.9 (ref 1.1–2.2)
ALBUMIN SERPL-MCNC: 3.5 G/DL (ref 3.4–5)
ALP BLD-CCNC: 64 U/L (ref 40–129)
ALT SERPL-CCNC: 10 U/L (ref 10–40)
ANION GAP SERPL CALCULATED.3IONS-SCNC: 12 MMOL/L (ref 3–16)
AST SERPL-CCNC: 18 U/L (ref 15–37)
BASOPHILS ABSOLUTE: 0 K/UL (ref 0–0.2)
BASOPHILS RELATIVE PERCENT: 0.4 %
BILIRUB SERPL-MCNC: <0.2 MG/DL (ref 0–1)
BUN BLDV-MCNC: 20 MG/DL (ref 7–20)
CALCIUM SERPL-MCNC: 8.9 MG/DL (ref 8.3–10.6)
CHLORIDE BLD-SCNC: 100 MMOL/L (ref 99–110)
CO2: 22 MMOL/L (ref 21–32)
CREAT SERPL-MCNC: 0.6 MG/DL (ref 0.6–1.1)
EOSINOPHILS ABSOLUTE: 0.3 K/UL (ref 0–0.6)
EOSINOPHILS RELATIVE PERCENT: 4.2 %
GFR AFRICAN AMERICAN: >60
GFR NON-AFRICAN AMERICAN: >60
GLUCOSE BLD-MCNC: 80 MG/DL (ref 70–99)
HCT VFR BLD CALC: 40.9 % (ref 36–48)
HEMOGLOBIN: 13.3 G/DL (ref 12–16)
LYMPHOCYTES ABSOLUTE: 3 K/UL (ref 1–5.1)
LYMPHOCYTES RELATIVE PERCENT: 39.6 %
MCH RBC QN AUTO: 31.6 PG (ref 26–34)
MCHC RBC AUTO-ENTMCNC: 32.5 G/DL (ref 31–36)
MCV RBC AUTO: 97.3 FL (ref 80–100)
MONOCYTES ABSOLUTE: 0.6 K/UL (ref 0–1.3)
MONOCYTES RELATIVE PERCENT: 7.5 %
NEUTROPHILS ABSOLUTE: 3.6 K/UL (ref 1.7–7.7)
NEUTROPHILS RELATIVE PERCENT: 48.3 %
PDW BLD-RTO: 14.2 % (ref 12.4–15.4)
PLATELET # BLD: 189 K/UL (ref 135–450)
PMV BLD AUTO: 9.6 FL (ref 5–10.5)
POTASSIUM REFLEX MAGNESIUM: 4.8 MMOL/L (ref 3.5–5.1)
RBC # BLD: 4.2 M/UL (ref 4–5.2)
SODIUM BLD-SCNC: 134 MMOL/L (ref 136–145)
TOTAL PROTEIN: 7.6 G/DL (ref 6.4–8.2)
WBC # BLD: 7.5 K/UL (ref 4–11)

## 2022-10-05 PROCEDURE — 80053 COMPREHEN METABOLIC PANEL: CPT

## 2022-10-05 PROCEDURE — G0378 HOSPITAL OBSERVATION PER HR: HCPCS

## 2022-10-05 PROCEDURE — 6360000002 HC RX W HCPCS

## 2022-10-05 PROCEDURE — 2580000003 HC RX 258

## 2022-10-05 PROCEDURE — 6360000002 HC RX W HCPCS: Performed by: NURSE PRACTITIONER

## 2022-10-05 PROCEDURE — 6370000000 HC RX 637 (ALT 250 FOR IP)

## 2022-10-05 PROCEDURE — 96365 THER/PROPH/DIAG IV INF INIT: CPT

## 2022-10-05 PROCEDURE — 76856 US EXAM PELVIC COMPLETE: CPT

## 2022-10-05 PROCEDURE — 96361 HYDRATE IV INFUSION ADD-ON: CPT

## 2022-10-05 PROCEDURE — 51701 INSERT BLADDER CATHETER: CPT

## 2022-10-05 PROCEDURE — 2580000003 HC RX 258: Performed by: INTERNAL MEDICINE

## 2022-10-05 PROCEDURE — 6360000002 HC RX W HCPCS: Performed by: EMERGENCY MEDICINE

## 2022-10-05 PROCEDURE — 36415 COLL VENOUS BLD VENIPUNCTURE: CPT

## 2022-10-05 PROCEDURE — 96376 TX/PRO/DX INJ SAME DRUG ADON: CPT

## 2022-10-05 PROCEDURE — 6360000002 HC RX W HCPCS: Performed by: INTERNAL MEDICINE

## 2022-10-05 PROCEDURE — 96375 TX/PRO/DX INJ NEW DRUG ADDON: CPT

## 2022-10-05 PROCEDURE — 96372 THER/PROPH/DIAG INJ SC/IM: CPT

## 2022-10-05 PROCEDURE — 85025 COMPLETE CBC W/AUTO DIFF WBC: CPT

## 2022-10-05 RX ORDER — ENOXAPARIN SODIUM 100 MG/ML
30 INJECTION SUBCUTANEOUS DAILY
Status: DISCONTINUED | OUTPATIENT
Start: 2022-10-05 | End: 2022-10-06 | Stop reason: HOSPADM

## 2022-10-05 RX ORDER — SODIUM HYPOCHLORITE 1.25 MG/ML
SOLUTION TOPICAL DAILY
Status: DISCONTINUED | OUTPATIENT
Start: 2022-10-05 | End: 2022-10-06 | Stop reason: HOSPADM

## 2022-10-05 RX ORDER — HYDROCODONE BITARTRATE AND ACETAMINOPHEN 5; 325 MG/1; MG/1
1 TABLET ORAL EVERY 4 HOURS PRN
Status: DISCONTINUED | OUTPATIENT
Start: 2022-10-05 | End: 2022-10-06 | Stop reason: HOSPADM

## 2022-10-05 RX ORDER — SODIUM CHLORIDE 9 MG/ML
INJECTION, SOLUTION INTRAVENOUS PRN
Status: DISCONTINUED | OUTPATIENT
Start: 2022-10-05 | End: 2022-10-06 | Stop reason: HOSPADM

## 2022-10-05 RX ORDER — PROCHLORPERAZINE EDISYLATE 5 MG/ML
10 INJECTION INTRAMUSCULAR; INTRAVENOUS EVERY 6 HOURS PRN
Status: DISCONTINUED | OUTPATIENT
Start: 2022-10-05 | End: 2022-10-06 | Stop reason: HOSPADM

## 2022-10-05 RX ORDER — SODIUM CHLORIDE 9 MG/ML
INJECTION, SOLUTION INTRAVENOUS CONTINUOUS
Status: ACTIVE | OUTPATIENT
Start: 2022-10-05 | End: 2022-10-06

## 2022-10-05 RX ORDER — METHOCARBAMOL 500 MG/1
1000 TABLET, FILM COATED ORAL 3 TIMES DAILY
Status: DISCONTINUED | OUTPATIENT
Start: 2022-10-05 | End: 2022-10-06 | Stop reason: HOSPADM

## 2022-10-05 RX ORDER — ACETAMINOPHEN 650 MG/1
650 SUPPOSITORY RECTAL EVERY 6 HOURS PRN
Status: DISCONTINUED | OUTPATIENT
Start: 2022-10-05 | End: 2022-10-06 | Stop reason: HOSPADM

## 2022-10-05 RX ORDER — ACETAMINOPHEN 325 MG/1
650 TABLET ORAL EVERY 6 HOURS PRN
Status: DISCONTINUED | OUTPATIENT
Start: 2022-10-05 | End: 2022-10-06 | Stop reason: HOSPADM

## 2022-10-05 RX ORDER — GABAPENTIN 400 MG/1
400 CAPSULE ORAL NIGHTLY
Status: DISCONTINUED | OUTPATIENT
Start: 2022-10-05 | End: 2022-10-06 | Stop reason: HOSPADM

## 2022-10-05 RX ORDER — SODIUM CHLORIDE 0.9 % (FLUSH) 0.9 %
5-40 SYRINGE (ML) INJECTION EVERY 12 HOURS SCHEDULED
Status: DISCONTINUED | OUTPATIENT
Start: 2022-10-05 | End: 2022-10-06 | Stop reason: HOSPADM

## 2022-10-05 RX ORDER — MORPHINE SULFATE 4 MG/ML
4 INJECTION, SOLUTION INTRAMUSCULAR; INTRAVENOUS
Status: DISCONTINUED | OUTPATIENT
Start: 2022-10-05 | End: 2022-10-06 | Stop reason: HOSPADM

## 2022-10-05 RX ORDER — POLYETHYLENE GLYCOL 3350 17 G/17G
17 POWDER, FOR SOLUTION ORAL DAILY PRN
Status: DISCONTINUED | OUTPATIENT
Start: 2022-10-05 | End: 2022-10-06 | Stop reason: HOSPADM

## 2022-10-05 RX ORDER — HYDROCODONE BITARTRATE AND ACETAMINOPHEN 5; 325 MG/1; MG/1
1 TABLET ORAL EVERY 6 HOURS PRN
Qty: 12 TABLET | Refills: 0 | Status: SHIPPED | OUTPATIENT
Start: 2022-10-05 | End: 2022-10-06 | Stop reason: HOSPADM

## 2022-10-05 RX ORDER — HYDROCODONE BITARTRATE AND ACETAMINOPHEN 5; 325 MG/1; MG/1
2 TABLET ORAL EVERY 4 HOURS PRN
Status: DISCONTINUED | OUTPATIENT
Start: 2022-10-05 | End: 2022-10-06 | Stop reason: HOSPADM

## 2022-10-05 RX ORDER — ONDANSETRON 4 MG/1
4 TABLET, ORALLY DISINTEGRATING ORAL EVERY 8 HOURS PRN
Status: DISCONTINUED | OUTPATIENT
Start: 2022-10-05 | End: 2022-10-06 | Stop reason: HOSPADM

## 2022-10-05 RX ORDER — ONDANSETRON 2 MG/ML
4 INJECTION INTRAMUSCULAR; INTRAVENOUS EVERY 6 HOURS PRN
Status: DISCONTINUED | OUTPATIENT
Start: 2022-10-05 | End: 2022-10-06 | Stop reason: HOSPADM

## 2022-10-05 RX ORDER — MORPHINE SULFATE 2 MG/ML
2 INJECTION, SOLUTION INTRAMUSCULAR; INTRAVENOUS EVERY 4 HOURS PRN
Status: DISCONTINUED | OUTPATIENT
Start: 2022-10-05 | End: 2022-10-06 | Stop reason: HOSPADM

## 2022-10-05 RX ORDER — SODIUM CHLORIDE 0.9 % (FLUSH) 0.9 %
5-40 SYRINGE (ML) INJECTION PRN
Status: DISCONTINUED | OUTPATIENT
Start: 2022-10-05 | End: 2022-10-06 | Stop reason: HOSPADM

## 2022-10-05 RX ADMIN — ONDANSETRON 4 MG: 2 INJECTION INTRAMUSCULAR; INTRAVENOUS at 20:00

## 2022-10-05 RX ADMIN — MORPHINE SULFATE 4 MG: 4 INJECTION, SOLUTION INTRAMUSCULAR; INTRAVENOUS at 20:02

## 2022-10-05 RX ADMIN — KETOROLAC TROMETHAMINE 15 MG: 30 INJECTION, SOLUTION INTRAMUSCULAR at 00:31

## 2022-10-05 RX ADMIN — PROCHLORPERAZINE EDISYLATE 10 MG: 5 INJECTION INTRAMUSCULAR; INTRAVENOUS at 22:09

## 2022-10-05 RX ADMIN — ONDANSETRON 4 MG: 2 INJECTION INTRAMUSCULAR; INTRAVENOUS at 06:06

## 2022-10-05 RX ADMIN — METHOCARBAMOL TABLETS 1000 MG: 500 TABLET, COATED ORAL at 09:43

## 2022-10-05 RX ADMIN — ENOXAPARIN SODIUM 30 MG: 100 INJECTION SUBCUTANEOUS at 09:43

## 2022-10-05 RX ADMIN — GABAPENTIN 400 MG: 400 CAPSULE ORAL at 20:00

## 2022-10-05 RX ADMIN — PROCHLORPERAZINE EDISYLATE 10 MG: 5 INJECTION INTRAMUSCULAR; INTRAVENOUS at 16:23

## 2022-10-05 RX ADMIN — MORPHINE SULFATE 4 MG: 4 INJECTION, SOLUTION INTRAMUSCULAR; INTRAVENOUS at 16:45

## 2022-10-05 RX ADMIN — MORPHINE SULFATE 4 MG: 4 INJECTION, SOLUTION INTRAMUSCULAR; INTRAVENOUS at 09:43

## 2022-10-05 RX ADMIN — SODIUM CHLORIDE: 9 INJECTION, SOLUTION INTRAVENOUS at 16:23

## 2022-10-05 RX ADMIN — CEFTRIAXONE SODIUM 1000 MG: 1 INJECTION, POWDER, FOR SOLUTION INTRAMUSCULAR; INTRAVENOUS at 16:23

## 2022-10-05 RX ADMIN — Medication 10 ML: at 09:44

## 2022-10-05 RX ADMIN — HYDROCODONE BITARTRATE AND ACETAMINOPHEN 2 TABLET: 5; 325 TABLET ORAL at 06:05

## 2022-10-05 RX ADMIN — ONDANSETRON 4 MG: 2 INJECTION INTRAMUSCULAR; INTRAVENOUS at 11:34

## 2022-10-05 RX ADMIN — MORPHINE SULFATE 4 MG: 4 INJECTION, SOLUTION INTRAMUSCULAR; INTRAVENOUS at 22:08

## 2022-10-05 ASSESSMENT — PAIN - FUNCTIONAL ASSESSMENT: PAIN_FUNCTIONAL_ASSESSMENT: ACTIVITIES ARE NOT PREVENTED

## 2022-10-05 ASSESSMENT — PAIN DESCRIPTION - DESCRIPTORS
DESCRIPTORS: PATIENT UNABLE TO DESCRIBE
DESCRIPTORS: ACHING;SORE

## 2022-10-05 ASSESSMENT — PAIN DESCRIPTION - LOCATION
LOCATION: ABDOMEN;FLANK
LOCATION: FLANK

## 2022-10-05 ASSESSMENT — PAIN DESCRIPTION - ORIENTATION
ORIENTATION: RIGHT;LOWER
ORIENTATION: RIGHT
ORIENTATION: RIGHT

## 2022-10-05 ASSESSMENT — PAIN SCALES - GENERAL
PAINLEVEL_OUTOF10: 8
PAINLEVEL_OUTOF10: 7
PAINLEVEL_OUTOF10: 8
PAINLEVEL_OUTOF10: 10

## 2022-10-05 ASSESSMENT — LIFESTYLE VARIABLES
HOW OFTEN DO YOU HAVE A DRINK CONTAINING ALCOHOL: NEVER
HOW MANY STANDARD DRINKS CONTAINING ALCOHOL DO YOU HAVE ON A TYPICAL DAY: PATIENT DOES NOT DRINK

## 2022-10-05 NOTE — PLAN OF CARE
Hospitalist Plan of Care Note      PCP: SHIRLEY Lopez - CNP    Date of Admission: 10/4/2022    Chief Complaint: R Flank Pain    Subjective: no new c/o       Medications:  Reviewed    Infusion Medications    sodium chloride       Scheduled Medications    sodium chloride flush  5-40 mL IntraVENous 2 times per day    enoxaparin  30 mg SubCUTAneous Daily    methocarbamol  1,000 mg Oral TID    gabapentin  400 mg Oral Nightly     PRN Meds: sodium chloride flush, sodium chloride, ondansetron **OR** ondansetron, polyethylene glycol, acetaminophen **OR** acetaminophen, HYDROcodone 5 mg - acetaminophen **OR** HYDROcodone 5 mg - acetaminophen      Intake/Output Summary (Last 24 hours) at 10/5/2022 0947  Last data filed at 10/5/2022 0051  Gross per 24 hour   Intake --   Output 125 ml   Net -125 ml       Physical Exam NOT necessarily Performed:    BP (!) 99/59   Pulse 65   Temp 97.6 °F (36.4 °C) (Oral)   Resp 16   Ht 4' 10.5\" (1.486 m)   Wt 105 lb (47.6 kg)   LMP 09/14/2022 (Within Days) Comment: tubal ligation  SpO2 97%   BMI 21.57 kg/m²       Labs:   Recent Labs     10/04/22  2004   WBC 8.8   HGB 13.9   HCT 41.5        Recent Labs     10/04/22  2004      K 4.5   CL 98*   CO2 27   BUN 18   CREATININE 0.6   CALCIUM 9.1     Recent Labs     10/04/22  2004   AST 17   ALT 10   BILITOT <0.2   ALKPHOS 66     No results for input(s): INR in the last 72 hours. No results for input(s): Hamp Memory in the last 72 hours.     Urinalysis:      Lab Results   Component Value Date/Time    NITRU Negative 10/04/2022 09:20 PM    [address] >100 09/28/2022 03:05 PM    BACTERIA 4+ 09/28/2022 03:05 PM    RBCUA see below 09/28/2022 03:05 PM    BLOODU Negative 10/04/2022 09:20 PM    SPECGRAV >=1.030 10/04/2022 09:20 PM    GLUCOSEU Negative 10/04/2022 09:20 PM       Consults:    None      Assessment/Plan:    Active Hospital Problems    Diagnosis     Flank pain, acute [R10.9]      Priority: Medium       Flank pain - of unclear etiology. Differential included cholecystitis, appendicitis, nephrolithiasis, ovarian cyst, muscle spasm, pyelonephritis, shingles, anxiety  - CT abdomen and pelvis showed no gallbladder wall thickening, appendix was normal, no evidence of nephrolithiasis, bilateral ovarian cysts  - No evidence of leukocytosis and urinalysis showed no evidence of infection  - No evidence of rash on physical exam and patient denies a history of chickenpox. - Patient does have baclofen pump. Will add Robaxin 1000 mg 3 times daily.  - Patient will be placed on a Norco to help with pain control  - Continue nightly gabapentin 400 mg        DVT Prophylaxis: LMWH     Recent Labs     10/04/22  2004        Diet: ADULT DIET; Regular  Code Status: Full Code      PT/OT Eval Status: not yet ordered. Dispo - Placed in OBServation. Patient is likely to remain in-house at least until Wed/Thurs 5/6 October pending clinical course.       Mimi Magallon MD

## 2022-10-05 NOTE — CARE COORDINATION
Cm met with pt briefly at bedside. Pt from home, IPTA. Pt with a history of Parapelgia, colostomy and baclofen pump. Pt denies needs for dc to home. Pt stated she should have a ride.

## 2022-10-05 NOTE — ED NOTES
This RN assisted pt in self-cath. Pt voided 125 mL via straight cath. Pt placed in clean, new depends. Pt placed in position of comfort, pt requesting a drink if possible. Pt in ED cart, locked lowest position, rails up for safety, call light within reach, no signs of distress noted at this time.       Mojgan Hall RN  10/05/22 7272

## 2022-10-05 NOTE — PROGRESS NOTES
Patient has colostomy in place, existing. Has not brought supplies. Understands we carry different brand but did not want device removed today to measure and change ostomy. Will re address tomorrow.
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
## 2022-10-05 NOTE — ED NOTES
This RN to the room to administer ordered dose of Toradol to pt. Pt found to not be in room and assumed to still be at [address]. This RN to medicate once pt returns.      Martell Ureña RN  10/05/22 0021
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
## 2022-10-05 NOTE — ED NOTES
This RN to the room to check on pt and provide for any needs. Pts lights dimmed on request, pt denies any other needs at this time. Pt in ED cart, locked lowest position, rails up for safety, call light within reach, no signs of distress noted at this time.       Thor Jasmine RN  10/05/22 4373

## 2022-10-05 NOTE — ED NOTES
This RN spoke with charge RN and explained that two PCAs from their floor are here to take pt upstairs now. RN stated the nurse taking over care of the pt will call back for report as soon as she returns from taking another pt to CT.       Kati Thomas RN  10/05/22 4585

## 2022-10-05 NOTE — ED PROVIDER NOTES
I independently performed a history and physical on [address] [place] [address]. All diagnostic, treatment, and disposition decisions were made by myself in conjunction with the advanced practice provider. See MARLO's note for complete documentation for this encounter. I reviewed pertinent nurse's notes, triage notes, vital signs, past medical history, family and social history, medications, and allergies. Complete review of systems was conducted by the mid-level provider and/or myself. Review of systems is negative except as documented in the history of present illness. EKG: Twelve-lead EKG as read and interpreted by myself shows:    Patient was placed on cardiac and blood pressure monitoring and interpreted by myself as follows:     MDM:    Adult female who comes in with right-sided flank pain. She was recently treated for urinary tract infection. Patient is paraplegic she has colostomy in place. On my exam she has right sided abdominal tenderness. She is thin built, she is nontoxic-appearing, she is in no acute distress with nonlabored respiration. Her vital signs are stable. Diagnostic work-up included laboratory studies and a CT scan with no obvious cause of the patient's symptoms. She does have redemonstration of bilateral cyst with possible hydrosalpinx. I do order an ultrasound so that we can have this more closely evaluated especially in the setting of paraplegia where her symptoms may not be the most reliable. The ultrasound is negative for any acute process. The patient denies being sexually active. I did go in to discuss all of our results with the patient and explained to her that we would send her home with medication for her symptoms however the patient becomes upset she states that she is still in a lot of pain. I explained to her that we have done every test that is possible here in the emergency room.   She does not want to go home, she states that she is not comfortable, the patient becomes tearful. I explained to her that I will contact the admitting provider to see if it is possible for her to be admitted for further evaluation and for pain management. A consult was placed to the hospitalist and his team is currently evaluating the patient. FINAL IMPRESSION     1. Flank pain    2. Intractable pain         DISPOSITION/FOLLOW-UP PLAN    DISPOSITION Decision To Admit 10/05/2022 01:39:13 AM      Idania Mendenhall [id number] [phone]    Call   For follow up         Electronically signed by: Jessica Espinosa M.D.   I am the primary physician of record         Oli Underwood MD  10/05/22 0346

## 2022-10-05 NOTE — ED NOTES
Pt provided apple juice on request. Pt denies any other needs at this time. Pt in ED cart, locked lowest position, rails up for safety, call light within reach, no signs of distress noted at this time.       Ciara Alba RN  10/05/22 7896
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
## 2022-10-05 NOTE — PROGRESS NOTES
..4 Eyes Skin Assessment     NAME:  Mello Blankenship  YOB: 1990  MEDICAL RECORD NUMBER:  [id number]    The patient is being assess for  Admission    I agree that 2 RN's have performed a thorough Head to Toe Skin Assessment on the patient. ALL assessment sites listed below have been assessed. Areas assessed by both nurses:    Head, Face, Ears, Shoulders, Back, Chest, Arms, Elbows, Hands, Sacrum. Buttock, Coccyx, Ischium, and Legs. Feet and Heels        Does the Patient have a Wound? Yes wound(s) were present on assessment.  LDA wound assessment was Initiated and completed        Tom Prevention initiated:  Yes   Wound Care Orders initiated:  Yes    Pressure Injury (Stage 3,4, Unstageable, DTI, NWPT, and Complex wounds) if present place referral/consult order under [de-identified] Yes    New and Established Ostomies if present place consult order under : Yes      Nurse 1 eSignature: Electronically signed by So Velasco on 10/5/22 at 5:28 AM EDT    **SHARE this note so that the co-signing nurse is able to place an eSignature**    Nurse 2 eSignature: {Esignature:[id number]}

## 2022-10-06 VITALS
OXYGEN SATURATION: 98 % | HEIGHT: 59 IN | RESPIRATION RATE: 16 BRPM | BODY MASS INDEX: 21.17 KG/M2 | DIASTOLIC BLOOD PRESSURE: 67 MMHG | WEIGHT: 105 LBS | TEMPERATURE: 97.5 F | HEART RATE: 71 BPM | SYSTOLIC BLOOD PRESSURE: 113 MMHG

## 2022-10-06 LAB
ALBUMIN SERPL-MCNC: 3.4 G/DL (ref 3.4–5)
ANION GAP SERPL CALCULATED.3IONS-SCNC: 10 MMOL/L (ref 3–16)
BUN BLDV-MCNC: 12 MG/DL (ref 7–20)
CALCIUM SERPL-MCNC: 8.2 MG/DL (ref 8.3–10.6)
CHLORIDE BLD-SCNC: 103 MMOL/L (ref 99–110)
CO2: 21 MMOL/L (ref 21–32)
CREAT SERPL-MCNC: <0.5 MG/DL (ref 0.6–1.1)
GFR AFRICAN AMERICAN: >60
GFR NON-AFRICAN AMERICAN: >60
GLUCOSE BLD-MCNC: 54 MG/DL (ref 70–99)
HCT VFR BLD CALC: 41.4 % (ref 36–48)
HEMOGLOBIN: 13.2 G/DL (ref 12–16)
MCH RBC QN AUTO: 32 PG (ref 26–34)
MCHC RBC AUTO-ENTMCNC: 31.8 G/DL (ref 31–36)
MCV RBC AUTO: 100.6 FL (ref 80–100)
PDW BLD-RTO: 15 % (ref 12.4–15.4)
PHOSPHORUS: 2.4 MG/DL (ref 2.5–4.9)
PLATELET # BLD: 151 K/UL (ref 135–450)
PMV BLD AUTO: 9 FL (ref 5–10.5)
POTASSIUM SERPL-SCNC: 4.4 MMOL/L (ref 3.5–5.1)
RBC # BLD: 4.12 M/UL (ref 4–5.2)
SODIUM BLD-SCNC: 134 MMOL/L (ref 136–145)
WBC # BLD: 5.1 K/UL (ref 4–11)

## 2022-10-06 PROCEDURE — 96361 HYDRATE IV INFUSION ADD-ON: CPT

## 2022-10-06 PROCEDURE — 96368 THER/DIAG CONCURRENT INF: CPT

## 2022-10-06 PROCEDURE — 51701 INSERT BLADDER CATHETER: CPT

## 2022-10-06 PROCEDURE — 6360000002 HC RX W HCPCS: Performed by: INTERNAL MEDICINE

## 2022-10-06 PROCEDURE — 96366 THER/PROPH/DIAG IV INF ADDON: CPT

## 2022-10-06 PROCEDURE — 96376 TX/PRO/DX INJ SAME DRUG ADON: CPT

## 2022-10-06 PROCEDURE — 96372 THER/PROPH/DIAG INJ SC/IM: CPT

## 2022-10-06 PROCEDURE — G0378 HOSPITAL OBSERVATION PER HR: HCPCS

## 2022-10-06 PROCEDURE — 80069 RENAL FUNCTION PANEL: CPT

## 2022-10-06 PROCEDURE — 2580000003 HC RX 258: Performed by: INTERNAL MEDICINE

## 2022-10-06 PROCEDURE — 85027 COMPLETE CBC AUTOMATED: CPT

## 2022-10-06 PROCEDURE — 36415 COLL VENOUS BLD VENIPUNCTURE: CPT

## 2022-10-06 PROCEDURE — 6360000002 HC RX W HCPCS

## 2022-10-06 PROCEDURE — 2500000003 HC RX 250 WO HCPCS: Performed by: INTERNAL MEDICINE

## 2022-10-06 RX ORDER — CEFUROXIME AXETIL 500 MG/1
500 TABLET ORAL 2 TIMES DAILY
Qty: 10 TABLET | Refills: 0 | Status: SHIPPED | OUTPATIENT
Start: 2022-10-06 | End: 2022-10-11

## 2022-10-06 RX ORDER — HYDROCODONE BITARTRATE AND ACETAMINOPHEN 5; 325 MG/1; MG/1
1 TABLET ORAL EVERY 6 HOURS PRN
Qty: 20 TABLET | Refills: 0 | Status: SHIPPED | OUTPATIENT
Start: 2022-10-06 | End: 2022-10-11

## 2022-10-06 RX ORDER — ONDANSETRON 4 MG/1
4 TABLET, ORALLY DISINTEGRATING ORAL 3 TIMES DAILY PRN
Qty: 15 TABLET | Refills: 0 | Status: SHIPPED | OUTPATIENT
Start: 2022-10-06 | End: 2022-10-11

## 2022-10-06 RX ADMIN — ONDANSETRON 4 MG: 2 INJECTION INTRAMUSCULAR; INTRAVENOUS at 05:52

## 2022-10-06 RX ADMIN — ENOXAPARIN SODIUM 30 MG: 100 INJECTION SUBCUTANEOUS at 08:12

## 2022-10-06 RX ADMIN — MORPHINE SULFATE 4 MG: 4 INJECTION, SOLUTION INTRAMUSCULAR; INTRAVENOUS at 05:53

## 2022-10-06 RX ADMIN — CEFTRIAXONE SODIUM 1000 MG: 1 INJECTION, POWDER, FOR SOLUTION INTRAMUSCULAR; INTRAVENOUS at 11:03

## 2022-10-06 RX ADMIN — MORPHINE SULFATE 4 MG: 4 INJECTION, SOLUTION INTRAMUSCULAR; INTRAVENOUS at 02:50

## 2022-10-06 RX ADMIN — SODIUM CHLORIDE: 9 INJECTION, SOLUTION INTRAVENOUS at 01:23

## 2022-10-06 RX ADMIN — MORPHINE SULFATE 4 MG: 4 INJECTION, SOLUTION INTRAMUSCULAR; INTRAVENOUS at 00:08

## 2022-10-06 RX ADMIN — SODIUM PHOSPHATE, MONOBASIC, MONOHYDRATE 20 MMOL: 276; 142 INJECTION, SOLUTION INTRAVENOUS at 12:02

## 2022-10-06 ASSESSMENT — PAIN SCALES - GENERAL
PAINLEVEL_OUTOF10: 7
PAINLEVEL_OUTOF10: 7
PAINLEVEL_OUTOF10: 8

## 2022-10-06 NOTE — DISCHARGE SUMMARY
Hospital Medicine Discharge Summary    Patient ID: Berkley Calix      Patient's PCP: Sree Sood APRN - CNP    Admit Date: 10/4/2022     Discharge Date: 10/6/2022      Admitting Physician: Thelma Eller MD     Discharge Physician: Gill Viramontes MD     Discharge Diagnoses: Active Hospital Problems    Diagnosis     Flank pain, acute [R10.9]      Priority: Medium       The patient was seen and examined on day of discharge and this discharge summary is in conjunction with any daily progress note from day of discharge. Hospital Course:       Pyelonephritis - clinically suspected but w/ relatively benign CT scan which demonstrated bilateral ovarian cysts. Admission U/A c/w acute cystitis. Infection evidenced by U/A, Cx results and/or signs/sxs of clinical infection despite Cx results. Had been on PO Bactrim PTArrival - d/c'd. Started on empiric Rocephin on 5 October pending Cx results. Changed to DAILY dosing w/ plan for PO ABX at discharge. - Patient does have baclofen pump. Robaxin 3 times daily and Norco added to help w/ pain control.   - Continue nightly gabapentin      HypoPhosphatemia - etiology clinically unable to determine. Follow serial labs and replace PRN - ordered IV 6 October. Reviewed and documented as above. Labs:  For convenience and continuity at follow-up the following most recent labs are provided:      CBC:    Lab Results   Component Value Date/Time    WBC 5.1 10/06/2022 06:44 AM    HGB 13.2 10/06/2022 06:44 AM    HCT 41.4 10/06/2022 06:44 AM     10/06/2022 06:44 AM       Renal:    Lab Results   Component Value Date/Time     10/06/2022 06:44 AM    K 4.4 10/06/2022 06:44 AM    K 4.8 10/05/2022 09:52 AM     10/06/2022 06:44 AM    CO2 21 10/06/2022 06:44 AM    BUN 12 10/06/2022 06:44 AM    CREATININE <0.5 10/06/2022 06:44 AM    CALCIUM 8.2 10/06/2022 06:44 AM    PHOS 2.4 10/06/2022 06:44 AM         Significant Diagnostic Studies    Radiology:   US PELVIS COMPLETE   Final Result   1. Right-sided ovarian cyst measuring 5.6 cm in greatest dimension with a   single posterior septation noted, though no internal flow. This is felt   related to a benign minimally complex cyst.   2. A left ovarian cyst is also noted measuring 4.2 cm, with adjacent   hydrosalpinx. 3. Given findings above, recommend a follow-up ultrasound in 1 year. CT ABDOMEN PELVIS W IV CONTRAST Additional Contrast? None   Final Result   1. Similar appearing 5 cm cystic structure seen in the right adnexa likely   representing an ovarian cyst.   2. Cystic adnexal lesions seen on the left, with a tubular fluid-filled   component suggestive of hydrosalpinx, with possible ovarian cyst as well as   hydrosalpinx. No internal gas to suggest pyosalpinx. 3. Mild stool volume in the colon. 4. No urinary tract calculi or obstruction. Consults:     None    Disposition: Home     Condition at Discharge: Stable    Discharge Instructions/Follow-up:  w/ PCP 1-2 weeks and subspecialists as arranged. Code Status:  Full Code    Activity: activity as tolerated    Diet: regular diet      Discharge Medications:     Discharge Medication List as of 10/6/2022  2:31 PM             Details   HYDROcodone-acetaminophen (NORCO) 5-325 MG per tablet Take 1 tablet by mouth every 6 hours as needed for Pain for up to 5 days. , Disp-20 tablet, R-0Print      cefUROXime (CEFTIN) 500 MG tablet Take 1 tablet by mouth 2 times daily for 5 days, Disp-10 tablet, R-0Print                Details   ondansetron (ZOFRAN-ODT) 4 MG disintegrating tablet Take 1 tablet by mouth 3 times daily as needed for Nausea or Vomiting, Disp-15 tablet, R-0Print             Time Spent on discharge is more than 30 minutes in the examination, evaluation, counseling and review of medications and discharge plan.       Signed:    Jay Reilly MD   10/6/2022      Thank you SHIRLEY Culver - DEMARIO for the opportunity to be involved in this patient's care. If you have any questions or concerns please feel free to contact me at [phone].
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
## 2022-10-06 NOTE — PROGRESS NOTES
deficits. Cranial nerves: II-XII intact, grossly non-focal.  Psychiatric: Alert and oriented, thought content appropriate, normal insight  Capillary Refill: Brisk,< 3 seconds   Peripheral Pulses: +2 palpable, equal bilaterally       Labs:   Recent Labs     10/04/22  2004 10/05/22  0952 10/06/22  0644   WBC 8.8 7.5 5.1   HGB 13.9 13.3 13.2   HCT 41.5 40.9 41.4    189 151     Recent Labs     10/04/22  2004 10/05/22  0952 10/06/22  0644    134* 134*   K 4.5 4.8 4.4   CL 98* 100 103   CO2 27 22 21   BUN 18 20 12   CREATININE 0.6 0.6 <0.5*   CALCIUM 9.1 8.9 8.2*   PHOS  --   --  2.4*     Recent Labs     10/04/22  2004 10/05/22  0952   AST 17 18   ALT 10 10   BILITOT <0.2 <0.2   ALKPHOS 66 64     No results for input(s): INR in the last 72 hours. No results for input(s): Lattie Kiara in the last 72 hours. Urinalysis:      Lab Results   Component Value Date/Time    NITRU Negative 10/04/2022 09:20 PM    [address] >100 09/28/2022 03:05 PM    BACTERIA 4+ 09/28/2022 03:05 PM    RBCUA see below 09/28/2022 03:05 PM    BLOODU Negative 10/04/2022 09:20 PM    SPECGRAV >=1.030 10/04/2022 09:20 PM    GLUCOSEU Negative 10/04/2022 09:20 PM       Consults:    None      Assessment/Plan:    Active Hospital Problems    Diagnosis     Flank pain, acute [R10.9]      Priority: Medium       Pyelonephritis - clinically suspected but w/ relatively benign CT scan which demonstrated bilateral ovarian cysts. Admission U/A c/w acute cystitis. Infection evidenced by U/A, Cx results and/or signs/sxs of clinical infection despite Cx results. Had been on PO Bactrim PTArrival - d/c'd. Started on empiric Rocephin on 5 October pending Cx results. Changed to DAILY dosing.    - Patient does have baclofen pump. Robaxin 1000 mg 3 times daily and Norco added to help w/ pain control.   - Continue nightly gabapentin 400 mg     HypoPhosphatemia - etiology clinically unable to determine.   Follow serial labs and replace PRN - ordered IV 6 October. Reviewed and documented as above. DVT Prophylaxis: LMWH     Recent Labs     10/04/22  2004 10/05/22  0952 10/06/22  0644    189 151     Diet: ADULT DIET; Regular  Code Status: Full Code       PT/OT Eval Status: not yet ordered. Dispo - Placed in OBServation. Patient is likely to remain in-house at least until Thurs/Friday 6/7 October pending clinical course.        Herson Nelson MD

## 2022-10-26 ENCOUNTER — HOSPITAL ENCOUNTER (EMERGENCY)
Age: 32
Discharge: HOME | End: 2022-10-27
Attending: EMERGENCY MEDICINE
Payer: COMMERCIAL

## 2022-10-26 DIAGNOSIS — R19.7 DIARRHEA, UNSPECIFIED TYPE: ICD-10-CM

## 2022-10-26 DIAGNOSIS — R10.84 GENERALIZED ABDOMINAL PAIN: Primary | ICD-10-CM

## 2022-10-26 PROCEDURE — 99285 EMERGENCY DEPT VISIT HI MDM: CPT

## 2022-10-26 PROCEDURE — 96375 TX/PRO/DX INJ NEW DRUG ADDON: CPT

## 2022-10-26 PROCEDURE — 96374 THER/PROPH/DIAG INJ IV PUSH: CPT

## 2022-10-26 RX ORDER — MORPHINE SULFATE 2 MG/ML
2 INJECTION, SOLUTION INTRAMUSCULAR; INTRAVENOUS ONCE
Status: COMPLETED | OUTPATIENT
Start: 2022-10-27 | End: 2022-10-27

## 2022-10-26 RX ORDER — 0.9 % SODIUM CHLORIDE 0.9 %
500 INTRAVENOUS SOLUTION INTRAVENOUS ONCE
Status: COMPLETED | OUTPATIENT
Start: 2022-10-27 | End: 2022-10-27

## 2022-10-26 RX ORDER — ONDANSETRON 2 MG/ML
4 INJECTION INTRAMUSCULAR; INTRAVENOUS ONCE
Status: COMPLETED | OUTPATIENT
Start: 2022-10-27 | End: 2022-10-27

## 2022-10-26 ASSESSMENT — PAIN - FUNCTIONAL ASSESSMENT: PAIN_FUNCTIONAL_ASSESSMENT: 0-10

## 2022-10-26 ASSESSMENT — PAIN SCALES - GENERAL: PAINLEVEL_OUTOF10: 10

## 2022-10-26 ASSESSMENT — PAIN DESCRIPTION - DESCRIPTORS: DESCRIPTORS: ACHING;BURNING

## 2022-10-26 ASSESSMENT — PAIN DESCRIPTION - LOCATION: LOCATION: ABDOMEN

## 2022-10-26 ASSESSMENT — PAIN DESCRIPTION - FREQUENCY: FREQUENCY: CONTINUOUS

## 2022-10-26 ASSESSMENT — PAIN DESCRIPTION - ORIENTATION: ORIENTATION: LOWER

## 2022-10-26 ASSESSMENT — PAIN DESCRIPTION - PAIN TYPE: TYPE: ACUTE PAIN

## 2022-10-27 ENCOUNTER — APPOINTMENT (OUTPATIENT)
Dept: CT IMAGING | Age: 32
End: 2022-10-27
Payer: COMMERCIAL

## 2022-10-27 VITALS
DIASTOLIC BLOOD PRESSURE: 78 MMHG | WEIGHT: 92 LBS | TEMPERATURE: 97.4 F | OXYGEN SATURATION: 98 % | HEART RATE: 60 BPM | BODY MASS INDEX: 18.9 KG/M2 | SYSTOLIC BLOOD PRESSURE: 105 MMHG | RESPIRATION RATE: 15 BRPM

## 2022-10-27 LAB
A/G RATIO: 1 (ref 1.1–2.2)
ALBUMIN SERPL-MCNC: 4 G/DL (ref 3.4–5)
ALP BLD-CCNC: 84 U/L (ref 40–129)
ALT SERPL-CCNC: 8 U/L (ref 10–40)
AMORPHOUS: ABNORMAL /HPF
ANION GAP SERPL CALCULATED.3IONS-SCNC: 8 MMOL/L (ref 3–16)
AST SERPL-CCNC: 14 U/L (ref 15–37)
BACTERIA: ABNORMAL /HPF
BASOPHILS ABSOLUTE: 0.1 K/UL (ref 0–0.2)
BASOPHILS RELATIVE PERCENT: 0.8 %
BILIRUB SERPL-MCNC: <0.2 MG/DL (ref 0–1)
BILIRUBIN URINE: NEGATIVE
BLOOD, URINE: ABNORMAL
BUN BLDV-MCNC: 13 MG/DL (ref 7–20)
CALCIUM SERPL-MCNC: 9 MG/DL (ref 8.3–10.6)
CHLORIDE BLD-SCNC: 104 MMOL/L (ref 99–110)
CLARITY: CLEAR
CO2: 27 MMOL/L (ref 21–32)
COLOR: YELLOW
CREAT SERPL-MCNC: <0.5 MG/DL (ref 0.6–1.1)
EOSINOPHILS ABSOLUTE: 0.1 K/UL (ref 0–0.6)
EOSINOPHILS RELATIVE PERCENT: 1.5 %
EPITHELIAL CELLS, UA: ABNORMAL /HPF (ref 0–5)
GFR SERPL CREATININE-BSD FRML MDRD: >60 ML/MIN/{1.73_M2}
GLUCOSE BLD-MCNC: 85 MG/DL (ref 70–99)
GLUCOSE URINE: NEGATIVE MG/DL
HCG(URINE) PREGNANCY TEST: NEGATIVE
HCT VFR BLD CALC: 38.9 % (ref 36–48)
HEMOGLOBIN: 13.2 G/DL (ref 12–16)
KETONES, URINE: 15 MG/DL
LACTIC ACID, SEPSIS: 0.8 MMOL/L (ref 0.4–1.9)
LEUKOCYTE ESTERASE, URINE: NEGATIVE
LIPASE: 28 U/L (ref 13–60)
LYMPHOCYTES ABSOLUTE: 2.3 K/UL (ref 1–5.1)
LYMPHOCYTES RELATIVE PERCENT: 26.8 %
MCH RBC QN AUTO: 32.1 PG (ref 26–34)
MCHC RBC AUTO-ENTMCNC: 34.1 G/DL (ref 31–36)
MCV RBC AUTO: 94.3 FL (ref 80–100)
MICROSCOPIC EXAMINATION: YES
MONOCYTES ABSOLUTE: 0.4 K/UL (ref 0–1.3)
MONOCYTES RELATIVE PERCENT: 4.3 %
MUCUS: ABNORMAL /LPF
NEUTROPHILS ABSOLUTE: 5.7 K/UL (ref 1.7–7.7)
NEUTROPHILS RELATIVE PERCENT: 66.6 %
NITRITE, URINE: NEGATIVE
PDW BLD-RTO: 14.4 % (ref 12.4–15.4)
PH UA: 6 (ref 5–8)
PLATELET # BLD: 213 K/UL (ref 135–450)
PMV BLD AUTO: 8.1 FL (ref 5–10.5)
POTASSIUM REFLEX MAGNESIUM: 4 MMOL/L (ref 3.5–5.1)
PROTEIN UA: NEGATIVE MG/DL
RBC # BLD: 4.12 M/UL (ref 4–5.2)
RBC UA: ABNORMAL /HPF (ref 0–4)
SODIUM BLD-SCNC: 139 MMOL/L (ref 136–145)
SPECIFIC GRAVITY UA: 1.02 (ref 1–1.03)
TOTAL PROTEIN: 8.2 G/DL (ref 6.4–8.2)
URINE REFLEX TO CULTURE: ABNORMAL
URINE TYPE: ABNORMAL
UROBILINOGEN, URINE: 0.2 E.U./DL
WBC # BLD: 8.6 K/UL (ref 4–11)
WBC UA: ABNORMAL /HPF (ref 0–5)

## 2022-10-27 PROCEDURE — 84703 CHORIONIC GONADOTROPIN ASSAY: CPT

## 2022-10-27 PROCEDURE — 83605 ASSAY OF LACTIC ACID: CPT

## 2022-10-27 PROCEDURE — 85025 COMPLETE CBC W/AUTO DIFF WBC: CPT

## 2022-10-27 PROCEDURE — 81001 URINALYSIS AUTO W/SCOPE: CPT

## 2022-10-27 PROCEDURE — 80053 COMPREHEN METABOLIC PANEL: CPT

## 2022-10-27 PROCEDURE — 6360000002 HC RX W HCPCS: Performed by: EMERGENCY MEDICINE

## 2022-10-27 PROCEDURE — 96375 TX/PRO/DX INJ NEW DRUG ADDON: CPT

## 2022-10-27 PROCEDURE — 2580000003 HC RX 258: Performed by: EMERGENCY MEDICINE

## 2022-10-27 PROCEDURE — 6360000004 HC RX CONTRAST MEDICATION: Performed by: EMERGENCY MEDICINE

## 2022-10-27 PROCEDURE — 74177 CT ABD & PELVIS W/CONTRAST: CPT

## 2022-10-27 PROCEDURE — 83690 ASSAY OF LIPASE: CPT

## 2022-10-27 PROCEDURE — 36415 COLL VENOUS BLD VENIPUNCTURE: CPT

## 2022-10-27 PROCEDURE — 96374 THER/PROPH/DIAG INJ IV PUSH: CPT

## 2022-10-27 RX ORDER — OMEPRAZOLE 20 MG/1
20 CAPSULE, DELAYED RELEASE ORAL
Qty: 60 CAPSULE | Refills: 0 | Status: SHIPPED | OUTPATIENT
Start: 2022-10-27 | End: 2022-11-26

## 2022-10-27 RX ADMIN — IOPAMIDOL 75 ML: 755 INJECTION, SOLUTION INTRAVENOUS at 01:31

## 2022-10-27 RX ADMIN — MORPHINE SULFATE 2 MG: 2 INJECTION, SOLUTION INTRAMUSCULAR; INTRAVENOUS at 00:42

## 2022-10-27 RX ADMIN — ONDANSETRON HYDROCHLORIDE 4 MG: 2 INJECTION, SOLUTION INTRAMUSCULAR; INTRAVENOUS at 00:40

## 2022-10-27 RX ADMIN — SODIUM CHLORIDE 500 ML: 9 INJECTION, SOLUTION INTRAVENOUS at 00:39

## 2022-10-27 ASSESSMENT — PAIN DESCRIPTION - LOCATION: LOCATION: ABDOMEN

## 2022-10-27 ASSESSMENT — PAIN SCALES - GENERAL: PAINLEVEL_OUTOF10: 9

## 2022-10-27 NOTE — DISCHARGE INSTRUCTIONS
Upper, urinalysis and CT scan are all reassuring. You do have some evidence of inflammation in your stomach and small intestine which may be related to gastritis or even ulcer disease. You should start taking omeprazole daily. I have referred you to GI for follow-up as well.   If you feel you are worsening at any point or develop new symptoms that concern you, return to the ER immediately
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
## 2022-11-03 RX ORDER — GABAPENTIN 400 MG/1
400 CAPSULE ORAL NIGHTLY
COMMUNITY

## 2022-11-03 NOTE — PROGRESS NOTES
PAT completed with patient orders placed per MD, patient states her cousin Becky Pond will be her  and caregiver after procedure. Silver Bedolla RN

## 2022-11-08 ENCOUNTER — ANESTHESIA EVENT (OUTPATIENT)
Dept: ENDOSCOPY | Age: 32
End: 2022-11-08
Payer: COMMERCIAL

## 2022-11-09 ENCOUNTER — ANESTHESIA (OUTPATIENT)
Dept: ENDOSCOPY | Age: 32
End: 2022-11-09
Payer: COMMERCIAL

## 2022-11-09 ENCOUNTER — HOSPITAL ENCOUNTER (OUTPATIENT)
Age: 32
Setting detail: OUTPATIENT SURGERY
Discharge: HOME | End: 2022-11-09
Attending: INTERNAL MEDICINE | Admitting: INTERNAL MEDICINE
Payer: COMMERCIAL

## 2022-11-09 VITALS
TEMPERATURE: 98.2 F | WEIGHT: 95 LBS | OXYGEN SATURATION: 97 % | HEIGHT: 59 IN | HEART RATE: 67 BPM | DIASTOLIC BLOOD PRESSURE: 64 MMHG | BODY MASS INDEX: 19.15 KG/M2 | SYSTOLIC BLOOD PRESSURE: 104 MMHG | RESPIRATION RATE: 16 BRPM

## 2022-11-09 DIAGNOSIS — R11.2 NAUSEA AND VOMITING, UNSPECIFIED VOMITING TYPE: ICD-10-CM

## 2022-11-09 DIAGNOSIS — R10.9 ABDOMINAL PAIN, UNSPECIFIED ABDOMINAL LOCATION: ICD-10-CM

## 2022-11-09 LAB — PREGNANCY, URINE: NEGATIVE

## 2022-11-09 PROCEDURE — 3700000001 HC ADD 15 MINUTES (ANESTHESIA): Performed by: INTERNAL MEDICINE

## 2022-11-09 PROCEDURE — 3700000000 HC ANESTHESIA ATTENDED CARE: Performed by: INTERNAL MEDICINE

## 2022-11-09 PROCEDURE — 7100000010 HC PHASE II RECOVERY - FIRST 15 MIN: Performed by: INTERNAL MEDICINE

## 2022-11-09 PROCEDURE — 88305 TISSUE EXAM BY PATHOLOGIST: CPT

## 2022-11-09 PROCEDURE — 2580000003 HC RX 258: Performed by: INTERNAL MEDICINE

## 2022-11-09 PROCEDURE — 7100000011 HC PHASE II RECOVERY - ADDTL 15 MIN: Performed by: INTERNAL MEDICINE

## 2022-11-09 PROCEDURE — 84703 CHORIONIC GONADOTROPIN ASSAY: CPT

## 2022-11-09 PROCEDURE — 2709999900 HC NON-CHARGEABLE SUPPLY: Performed by: INTERNAL MEDICINE

## 2022-11-09 PROCEDURE — 6360000002 HC RX W HCPCS: Performed by: NURSE ANESTHETIST, CERTIFIED REGISTERED

## 2022-11-09 PROCEDURE — 3609012400 HC EGD TRANSORAL BIOPSY SINGLE/MULTIPLE: Performed by: INTERNAL MEDICINE

## 2022-11-09 RX ORDER — MEPERIDINE HYDROCHLORIDE 25 MG/ML
12.5 INJECTION INTRAMUSCULAR; INTRAVENOUS; SUBCUTANEOUS EVERY 5 MIN PRN
Status: CANCELLED | OUTPATIENT
Start: 2022-11-09

## 2022-11-09 RX ORDER — SODIUM CHLORIDE 0.9 % (FLUSH) 0.9 %
5-40 SYRINGE (ML) INJECTION EVERY 12 HOURS SCHEDULED
Status: CANCELLED | OUTPATIENT
Start: 2022-11-09

## 2022-11-09 RX ORDER — ONDANSETRON 2 MG/ML
4 INJECTION INTRAMUSCULAR; INTRAVENOUS
Status: CANCELLED | OUTPATIENT
Start: 2022-11-09 | End: 2022-11-10

## 2022-11-09 RX ORDER — PROPOFOL 10 MG/ML
INJECTION, EMULSION INTRAVENOUS PRN
Status: DISCONTINUED | OUTPATIENT
Start: 2022-11-09 | End: 2022-11-09 | Stop reason: SDUPTHER

## 2022-11-09 RX ORDER — SODIUM CHLORIDE 0.9 % (FLUSH) 0.9 %
5-40 SYRINGE (ML) INJECTION PRN
Status: CANCELLED | OUTPATIENT
Start: 2022-11-09

## 2022-11-09 RX ORDER — SODIUM CHLORIDE, SODIUM LACTATE, POTASSIUM CHLORIDE, CALCIUM CHLORIDE 600; 310; 30; 20 MG/100ML; MG/100ML; MG/100ML; MG/100ML
INJECTION, SOLUTION INTRAVENOUS CONTINUOUS
Status: DISCONTINUED | OUTPATIENT
Start: 2022-11-09 | End: 2022-11-09 | Stop reason: HOSPADM

## 2022-11-09 RX ORDER — OXYCODONE HYDROCHLORIDE 5 MG/1
5 TABLET ORAL PRN
Status: CANCELLED | OUTPATIENT
Start: 2022-11-09 | End: 2022-11-09

## 2022-11-09 RX ORDER — OXYCODONE HYDROCHLORIDE 5 MG/1
10 TABLET ORAL PRN
Status: CANCELLED | OUTPATIENT
Start: 2022-11-09 | End: 2022-11-09

## 2022-11-09 RX ORDER — SODIUM CHLORIDE 9 MG/ML
25 INJECTION, SOLUTION INTRAVENOUS PRN
Status: CANCELLED | OUTPATIENT
Start: 2022-11-09

## 2022-11-09 RX ADMIN — PROPOFOL 50 MG: 10 INJECTION, EMULSION INTRAVENOUS at 11:46

## 2022-11-09 RX ADMIN — SODIUM CHLORIDE, POTASSIUM CHLORIDE, SODIUM LACTATE AND CALCIUM CHLORIDE: 600; 310; 30; 20 INJECTION, SOLUTION INTRAVENOUS at 11:35

## 2022-11-09 RX ADMIN — PROPOFOL 100 MG: 10 INJECTION, EMULSION INTRAVENOUS at 11:42

## 2022-11-09 ASSESSMENT — PAIN SCALES - GENERAL: PAINLEVEL_OUTOF10: 0

## 2022-11-09 ASSESSMENT — ENCOUNTER SYMPTOMS: SHORTNESS OF BREATH: 0

## 2022-11-09 ASSESSMENT — PAIN - FUNCTIONAL ASSESSMENT: PAIN_FUNCTIONAL_ASSESSMENT: NONE - DENIES PAIN

## 2022-11-09 ASSESSMENT — LIFESTYLE VARIABLES: SMOKING_STATUS: 1

## 2022-11-09 NOTE — DISCHARGE INSTRUCTIONS
PATIENT INSTRUCTIONS  POST-SEDATION    Ruby Pena          IMMEDIATELY FOLLOWING PROCEDURE:    Do not drive or operate machinery for the first twenty four hours after surgery. Do not make any important decisions for twenty four hours after surgery or while taking narcotic pain medications or sedatives. You should NOT BE LEFT UNATTENDED OR ALONE. A responsible adult should be with you for the rest of the day of your procedure and also during the night for your protection and safety. You may experience some light headedness. Rest at home with activity as tolerated. You may not need to go to bed, but it is important to rest for the next 24 hours. You should not engage in athletic sports such as basketball, volleyball, jogging, skating, or activities requiring refined motor skills for 24 hours. If you develop intractable nausea and vomiting or a severe headache please notify your doctor immediately. You are not expected to have any fever, but if you feel warm, take your temperature. If you have a fever 101 degrees or higher, call your doctor. If you have had an Endoscopy:   *Eat lightly for your first meal and gradually resume your normal / prescribed diet. *If you have a sore throat you may use lozenges, or salt water gargles. *If you have had a colonoscopy, do not expect a normal bowel movement for approximately three days due to the cleansing of the large intestine prior to colonoscopy. ONCE YOU ARE HOME, IF YOU SHOULD HAVE:  Difficulty in breathing, persistent nausea or vomiting, bleeding you feel is excessive, or pain that is unusual, increased abdominal bloating, or any swelling, fever / chills, call your physician. If you cannot contact your physician, but feel that your signs and symptoms need a physician's attention, go to the Emergency Department. FOLLOW-UP:    Please follow up with Laz CUEVA as scheduled or needed.       Dr. Nickie Lantigua office will call you with the biopsy findings. Call Dr. Deepa Willis if there are any GI concerns. [phone]. Upper GI Endoscopy: What to Expect at [address] had an upper GI endoscopy. Your doctor used a thin, lighted tube that bends to look at the inside of your esophagus, your stomach, and the first part of the small intestine, called the duodenum. After you have an endoscopy, you will stay at the hospital or clinic for 1 to 2 hours. This will allow the medicine to wear off. You will be able to go home after your doctor or nurse checks to make sure that you're not having any problems. You may have to stay overnight if you had treatment during the test. You may have a sore throat for a day or two after the test.  This care sheet gives you a general idea about what to expect after the test.  How can you care for yourself at home? Activity   Rest as much as you need to after you go home. You should be able to go back to your usual activities the day after the test.  Diet   Follow your doctor's directions for eating after the test.  Drink plenty of fluids (unless your doctor has told you not to). Medications   If you have a sore throat the day after the test, use an over-the-counter spray to numb your throat. Follow-up care is a key part of your treatment and safety. Be sure to make and go to all appointments, and call your doctor if you are having problems. It's also a good idea to know your test results and keep a list of the medicines you take. When should you call for help? Call 911 anytime you think you may need emergency care. For example, call if:    You passed out (lost consciousness). You have trouble breathing. You pass maroon or bloody stools. Call your doctor now or seek immediate medical care if:    You have pain that does not get better after your take pain medicine. You have new or worse belly pain. You have blood in your stools.      You are sick to your stomach and cannot keep fluids down. You have a fever. You cannot pass stools or gas. Watch closely for changes in your health, and be sure to contact your doctor if:    Your throat still hurts after a day or two. You do not get better as expected. Where can you learn more? Go to https://chpepiceweb.[email or web address] org and sign in to your [employer] account. Enter H988 in the [employer] box to learn more about \"Upper GI Endoscopy: What to Expect at Home. \"     If you do not have an account, please click on the \"Sign Up Now\" link. Current as of: June 6, 2022               Content Version: 13.4  © 2006-2022 Healthwise, [employer]. Care instructions adapted under license by [facility] ([facility]). If you have questions about a medical condition or this instruction, always ask your healthcare professional. [place] [address] any warranty or liability for your use of this information. ANESTHESIA DISCHARGE INSTRUCTIONS    You are under the influence of drugs- do not drink alcohol, drive a car, operate machinery(such as power tools, kitchen appliances, etc), sign legal documents, or make any important decisions for 24 hours (or while on pain medications). Children should not ride bikes or [place] or play on gym sets  for 24 hours after surgery. A responsible adult should be with you for 24 hours. Rest at home today- increase activity as tolerated. Progress slowly to a regular diet unless your physician has instructed you otherwise. Drink plenty of water. CALL YOUR DOCTOR IF YOU:  Have moderate to severe nausea or vomiting AND are unable to hold down fluids or prescribed medications. Have bright red bloody drainage from your dressing that won't stop oozing.   Do not get relief with your pain medication    NORMAL (POSSIBLE) SIDE EFFECTS FROM ANESTHESIA:     Confusion, temporary memory loss, delayed reaction times in the first 24 hours  Lightheadedness, dizziness, difficulty focusing, blurred vision  Nausea/vomiting can happen  Shivering, feeling cold, sore throat, cough and muscle aches should stop within 24-48 hours  Trouble urinating - call your surgeon if it has been more than 8 hrs  Bruising or soreness at the IV site - call if it remains red, firm or there is drainage             FEMALES OF CHILDBEARING AGE WHO ARE TAKING BIRTH CONTROL PILLS:  You may have received a medication during your procedure that interferes with the   actions of birth control pills (Bridion or Emend). Use some other kind of birth control in addition to your pills, like a condom, for 1 month after your procedure to prevent unwanted pregnancy. The following instructions are to be followed if you have a known history or diagnosis of sleep apnea: For all sleep apnea patients:  ? Sleep on your side or sitting up in a chair whenever possible, especially the first 24 hours after surgery. ? Use only medicines prescribed by your doctor. ? Do not drink alcohol. ? If you have a dental device to assist you while at rest, use it at all times for the first 24 hours. For patients using CPAP machines:  ? Use your CPAP machine during all periods of sleep as usual.  ? Use your CPAP machine during all periods of daytime rest while on pain medicines. ** Follow up with your primary care doctor for continued care. IF YOU DO NOT TAKE ALL OF YOUR NARCOTIC PAIN MEDICATION, please dispose of them responsibly. There are drop off boxes in the Emergency Departments 24/7 at both [facility]. If these locations are not convenient, other options for discarding them can be found at:  [email or web address] [email or web address]    Hospital or office staff may NOT accept any medications to drop off in the cabinet for you.

## 2022-11-09 NOTE — ANESTHESIA POSTPROCEDURE EVALUATION
Department of Anesthesiology  Postprocedure Note    Patient: Mello Blankenship  MRN: [id number]  YOB: 1990  Date of evaluation: 11/9/2022      Procedure Summary     Date: 11/09/22 Room / Location: [place] [address] / [facility]    Anesthesia Start: 4520 Anesthesia Stop: 0824    Procedure: EGD BIOPSY Diagnosis:       Abdominal pain, unspecified abdominal location      Nausea and vomiting, unspecified vomiting type      (ABDOMINAL PAIN, VOMITING, NAUSEA)    Surgeons: Virginie Browne MD Responsible Provider: Neetu Peck MD    Anesthesia Type: General ASA Status: 3          Anesthesia Type: General    Alexandre Phase I:      Alexandre Phase II: Alexandre Score: 9    Vitals:    11/03/22 1343 11/09/22 1039 11/09/22 1213 11/09/22 1228   BP:  (!) 105/49 103/63 104/64   Pulse:  68 62 67   Resp:  16 16 16   Temp:  97.5 °F (36.4 °C) 98.2 °F (36.8 °C)    TempSrc:  Tympanic Temporal    SpO2:  100% 97%    Weight: 95 lb (43.1 kg)      Height: 4' 10.5\" (1.486 m)        Anesthesia Post Evaluation    Patient location during evaluation: bedside  Patient participation: complete - patient participated  Level of consciousness: awake and alert  Airway patency: patent  Nausea & Vomiting: no nausea  Complications: no  Cardiovascular status: hemodynamically stable  Respiratory status: acceptable  Hydration status: euvolemic
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
## 2022-11-09 NOTE — BRIEF OP NOTE
Brief Postoperative Note      Patient: Niru Digeo  YOB: 1990  MRN: [id number]    Date of Procedure: 11/9/2022    Pre-Op Diagnosis: ABDOMINAL PAIN, VOMITING, NAUSEA    Post-Op Diagnosis:  gastritis       Procedure(s):  EGD BIOPSY    Surgeon(s):  Florencio Mcgraw MD    Assistant:  * No surgical staff found *    Anesthesia: Monitor Anesthesia Care    Estimated Blood Loss (mL): Minimal    Complications: None    Specimens:   ID Type Source Tests Collected by Time Destination   A :  Tissue Biopsy SURGICAL PATHOLOGY Florencio Mcgraw MD 11/9/2022 1145        Implants:  * No implants in log *      Drains:   Negative Pressure Wound Therapy Abdomen Mid (Active)       Gastrostomy/Enterostomy/Jejunostomy Jejunostomy RLQ 1 (Active)       Gastrostomy/Enterostomy/Jejunostomy Jejunostomy RLQ 2 (Active)       Colostomy LUQ Descending/sigmoid (Active)       Findings: gastritis    Electronically signed by Florencio Mcgraw MD on 11/9/2022 at 11:55 AM
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
## 2022-11-25 ENCOUNTER — HOSPITAL ENCOUNTER (EMERGENCY)
Age: 32
Discharge: LEFT BEFORE DISCHARGE | End: 2022-11-25
Attending: EMERGENCY MEDICINE
Payer: COMMERCIAL

## 2022-11-25 ENCOUNTER — APPOINTMENT (OUTPATIENT)
Dept: CT IMAGING | Age: 32
End: 2022-11-25
Payer: COMMERCIAL

## 2022-11-25 VITALS
DIASTOLIC BLOOD PRESSURE: 71 MMHG | RESPIRATION RATE: 16 BRPM | OXYGEN SATURATION: 99 % | HEART RATE: 95 BPM | TEMPERATURE: 97 F | SYSTOLIC BLOOD PRESSURE: 107 MMHG | BODY MASS INDEX: 19.52 KG/M2 | WEIGHT: 95 LBS

## 2022-11-25 DIAGNOSIS — R10.9 RIGHT FLANK PAIN: Primary | ICD-10-CM

## 2022-11-25 LAB
A/G RATIO: 1 (ref 1.1–2.2)
ALBUMIN SERPL-MCNC: 3.6 G/DL (ref 3.4–5)
ALP BLD-CCNC: 69 U/L (ref 40–129)
ALT SERPL-CCNC: 13 U/L (ref 10–40)
ANION GAP SERPL CALCULATED.3IONS-SCNC: 8 MMOL/L (ref 3–16)
AST SERPL-CCNC: 16 U/L (ref 15–37)
BASOPHILS ABSOLUTE: 0.1 K/UL (ref 0–0.2)
BASOPHILS RELATIVE PERCENT: 0.9 %
BILIRUB SERPL-MCNC: <0.2 MG/DL (ref 0–1)
BILIRUBIN URINE: NEGATIVE
BLOOD, URINE: NEGATIVE
BUN BLDV-MCNC: 21 MG/DL (ref 7–20)
CALCIUM SERPL-MCNC: 8.8 MG/DL (ref 8.3–10.6)
CHLORIDE BLD-SCNC: 105 MMOL/L (ref 99–110)
CLARITY: CLEAR
CO2: 26 MMOL/L (ref 21–32)
COLOR: YELLOW
CREAT SERPL-MCNC: <0.5 MG/DL (ref 0.6–1.1)
EOSINOPHILS ABSOLUTE: 0.3 K/UL (ref 0–0.6)
EOSINOPHILS RELATIVE PERCENT: 2.9 %
GFR SERPL CREATININE-BSD FRML MDRD: >60 ML/MIN/{1.73_M2}
GLUCOSE BLD-MCNC: 95 MG/DL (ref 70–99)
GLUCOSE URINE: NEGATIVE MG/DL
HCG(URINE) PREGNANCY TEST: NEGATIVE
HCT VFR BLD CALC: 37.4 % (ref 36–48)
HEMOGLOBIN: 12.8 G/DL (ref 12–16)
KETONES, URINE: NEGATIVE MG/DL
LEUKOCYTE ESTERASE, URINE: NEGATIVE
LYMPHOCYTES ABSOLUTE: 3.3 K/UL (ref 1–5.1)
LYMPHOCYTES RELATIVE PERCENT: 35.3 %
MCH RBC QN AUTO: 31.9 PG (ref 26–34)
MCHC RBC AUTO-ENTMCNC: 34.2 G/DL (ref 31–36)
MCV RBC AUTO: 93.3 FL (ref 80–100)
MICROSCOPIC EXAMINATION: NORMAL
MONOCYTES ABSOLUTE: 0.6 K/UL (ref 0–1.3)
MONOCYTES RELATIVE PERCENT: 6.6 %
NEUTROPHILS ABSOLUTE: 5 K/UL (ref 1.7–7.7)
NEUTROPHILS RELATIVE PERCENT: 54.3 %
NITRITE, URINE: NEGATIVE
PDW BLD-RTO: 14.1 % (ref 12.4–15.4)
PH UA: 6 (ref 5–8)
PLATELET # BLD: 202 K/UL (ref 135–450)
PMV BLD AUTO: 9.3 FL (ref 5–10.5)
POTASSIUM REFLEX MAGNESIUM: 4.2 MMOL/L (ref 3.5–5.1)
PROTEIN UA: NEGATIVE MG/DL
RBC # BLD: 4.01 M/UL (ref 4–5.2)
SODIUM BLD-SCNC: 139 MMOL/L (ref 136–145)
SPECIFIC GRAVITY UA: 1.02 (ref 1–1.03)
TOTAL PROTEIN: 7.1 G/DL (ref 6.4–8.2)
URINE REFLEX TO CULTURE: NORMAL
URINE TYPE: NORMAL
UROBILINOGEN, URINE: 0.2 E.U./DL
WBC # BLD: 9.2 K/UL (ref 4–11)

## 2022-11-25 PROCEDURE — 80053 COMPREHEN METABOLIC PANEL: CPT

## 2022-11-25 PROCEDURE — 36415 COLL VENOUS BLD VENIPUNCTURE: CPT

## 2022-11-25 PROCEDURE — 84703 CHORIONIC GONADOTROPIN ASSAY: CPT

## 2022-11-25 PROCEDURE — 81003 URINALYSIS AUTO W/O SCOPE: CPT

## 2022-11-25 PROCEDURE — 6360000002 HC RX W HCPCS: Performed by: NURSE PRACTITIONER

## 2022-11-25 PROCEDURE — 85025 COMPLETE CBC W/AUTO DIFF WBC: CPT

## 2022-11-25 PROCEDURE — 6360000004 HC RX CONTRAST MEDICATION: Performed by: EMERGENCY MEDICINE

## 2022-11-25 PROCEDURE — 96374 THER/PROPH/DIAG INJ IV PUSH: CPT

## 2022-11-25 PROCEDURE — 99285 EMERGENCY DEPT VISIT HI MDM: CPT

## 2022-11-25 PROCEDURE — 74177 CT ABD & PELVIS W/CONTRAST: CPT

## 2022-11-25 RX ORDER — KETOROLAC TROMETHAMINE 30 MG/ML
30 INJECTION, SOLUTION INTRAMUSCULAR; INTRAVENOUS ONCE
Status: COMPLETED | OUTPATIENT
Start: 2022-11-25 | End: 2022-11-25

## 2022-11-25 RX ADMIN — KETOROLAC TROMETHAMINE 30 MG: 30 INJECTION, SOLUTION INTRAMUSCULAR at 20:12

## 2022-11-25 RX ADMIN — IOPAMIDOL 65 ML: 755 INJECTION, SOLUTION INTRAVENOUS at 21:30

## 2022-11-25 ASSESSMENT — ENCOUNTER SYMPTOMS
NAUSEA: 0
SHORTNESS OF BREATH: 0
BACK PAIN: 0
DIARRHEA: 0
COUGH: 0
ABDOMINAL PAIN: 0
WHEEZING: 0
VOMITING: 0
SORE THROAT: 0
COLOR CHANGE: 0
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
## 2023-07-29 ENCOUNTER — HOSPITAL ENCOUNTER (EMERGENCY)
Age: 33
Discharge: ANOTHER HOSPITAL | End: 2023-07-30
Attending: STUDENT IN AN ORGANIZED HEALTH CARE EDUCATION/TRAINING PROGRAM
Payer: COMMERCIAL

## 2023-07-29 ENCOUNTER — APPOINTMENT (OUTPATIENT)
Dept: GENERAL RADIOLOGY | Age: 33
End: 2023-07-29
Payer: COMMERCIAL

## 2023-07-29 ENCOUNTER — APPOINTMENT (OUTPATIENT)
Dept: CT IMAGING | Age: 33
End: 2023-07-29
Payer: COMMERCIAL

## 2023-07-29 DIAGNOSIS — L76.82 POSTOPERATIVE SURGICAL COMPLICATION INVOLVING SUBCUTANEOUS TISSUE ASSOCIATED WITH NON-DERMATOLOGIC PROCEDURE, UNSPECIFIED COMPLICATION: Primary | ICD-10-CM

## 2023-07-29 LAB
ALBUMIN SERPL-MCNC: 3.9 G/DL (ref 3.4–5)
ALBUMIN/GLOB SERPL: 0.9 {RATIO} (ref 1.1–2.2)
ALP SERPL-CCNC: 68 U/L (ref 40–129)
ALT SERPL-CCNC: 7 U/L (ref 10–40)
ANION GAP SERPL CALCULATED.3IONS-SCNC: 10 MMOL/L (ref 3–16)
AST SERPL-CCNC: 15 U/L (ref 15–37)
BASOPHILS # BLD: 0.1 K/UL (ref 0–0.2)
BASOPHILS NFR BLD: 0.4 %
BILIRUB SERPL-MCNC: 0.5 MG/DL (ref 0–1)
BILIRUB UR QL STRIP.AUTO: ABNORMAL
BUN SERPL-MCNC: 14 MG/DL (ref 7–20)
CALCIUM SERPL-MCNC: 9 MG/DL (ref 8.3–10.6)
CHLORIDE SERPL-SCNC: 99 MMOL/L (ref 99–110)
CLARITY UR: CLEAR
CO2 SERPL-SCNC: 21 MMOL/L (ref 21–32)
COLOR UR: YELLOW
CREAT SERPL-MCNC: <0.5 MG/DL (ref 0.6–1.1)
DEPRECATED RDW RBC AUTO: 14.3 % (ref 12.4–15.4)
EOSINOPHIL # BLD: 0.1 K/UL (ref 0–0.6)
EOSINOPHIL NFR BLD: 0.6 %
EPI CELLS #/AREA URNS HPF: ABNORMAL /HPF (ref 0–5)
GFR SERPLBLD CREATININE-BSD FMLA CKD-EPI: >60 ML/MIN/{1.73_M2}
GLUCOSE SERPL-MCNC: 106 MG/DL (ref 70–99)
GLUCOSE UR STRIP.AUTO-MCNC: NEGATIVE MG/DL
HCT VFR BLD AUTO: 45.4 % (ref 36–48)
HGB BLD-MCNC: 15.6 G/DL (ref 12–16)
HGB UR QL STRIP.AUTO: NEGATIVE
KETONES UR STRIP.AUTO-MCNC: ABNORMAL MG/DL
LACTATE BLDV-SCNC: 1 MMOL/L (ref 0.4–1.9)
LEUKOCYTE ESTERASE UR QL STRIP.AUTO: NEGATIVE
LYMPHOCYTES # BLD: 1.2 K/UL (ref 1–5.1)
LYMPHOCYTES NFR BLD: 6.5 %
MCH RBC QN AUTO: 32.4 PG (ref 26–34)
MCHC RBC AUTO-ENTMCNC: 34.3 G/DL (ref 31–36)
MCV RBC AUTO: 94.4 FL (ref 80–100)
MONOCYTES # BLD: 1.1 K/UL (ref 0–1.3)
MONOCYTES NFR BLD: 6.1 %
MUCOUS THREADS #/AREA URNS LPF: ABNORMAL /LPF
NEUTROPHILS # BLD: 15.2 K/UL (ref 1.7–7.7)
NEUTROPHILS NFR BLD: 86.4 %
NITRITE UR QL STRIP.AUTO: NEGATIVE
PH UR STRIP.AUTO: 6 [PH] (ref 5–8)
PLATELET # BLD AUTO: 185 K/UL (ref 135–450)
PMV BLD AUTO: 9.6 FL (ref 5–10.5)
POTASSIUM SERPL-SCNC: 3.6 MMOL/L (ref 3.5–5.1)
PROCALCITONIN SERPL IA-MCNC: 0.07 NG/ML (ref 0–0.15)
PROT SERPL-MCNC: 8.2 G/DL (ref 6.4–8.2)
PROT UR STRIP.AUTO-MCNC: 30 MG/DL
RBC # BLD AUTO: 4.81 M/UL (ref 4–5.2)
RBC #/AREA URNS HPF: ABNORMAL /HPF (ref 0–4)
SODIUM SERPL-SCNC: 130 MMOL/L (ref 136–145)
SP GR UR STRIP.AUTO: >=1.03 (ref 1–1.03)
UA COMPLETE W REFLEX CULTURE PNL UR: ABNORMAL
UA DIPSTICK W REFLEX MICRO PNL UR: YES
URN SPEC COLLECT METH UR: ABNORMAL
UROBILINOGEN UR STRIP-ACNC: 1 E.U./DL
WBC # BLD AUTO: 17.6 K/UL (ref 4–11)
WBC #/AREA URNS HPF: ABNORMAL /HPF (ref 0–5)

## 2023-07-29 PROCEDURE — 96366 THER/PROPH/DIAG IV INF ADDON: CPT

## 2023-07-29 PROCEDURE — 74177 CT ABD & PELVIS W/CONTRAST: CPT

## 2023-07-29 PROCEDURE — 6360000002 HC RX W HCPCS: Performed by: STUDENT IN AN ORGANIZED HEALTH CARE EDUCATION/TRAINING PROGRAM

## 2023-07-29 PROCEDURE — 84145 PROCALCITONIN (PCT): CPT

## 2023-07-29 PROCEDURE — 6360000004 HC RX CONTRAST MEDICATION: Performed by: STUDENT IN AN ORGANIZED HEALTH CARE EDUCATION/TRAINING PROGRAM

## 2023-07-29 PROCEDURE — 96375 TX/PRO/DX INJ NEW DRUG ADDON: CPT

## 2023-07-29 PROCEDURE — 71045 X-RAY EXAM CHEST 1 VIEW: CPT

## 2023-07-29 PROCEDURE — 96376 TX/PRO/DX INJ SAME DRUG ADON: CPT

## 2023-07-29 PROCEDURE — 99285 EMERGENCY DEPT VISIT HI MDM: CPT

## 2023-07-29 PROCEDURE — 85025 COMPLETE CBC W/AUTO DIFF WBC: CPT

## 2023-07-29 PROCEDURE — 96372 THER/PROPH/DIAG INJ SC/IM: CPT

## 2023-07-29 PROCEDURE — 96367 TX/PROPH/DG ADDL SEQ IV INF: CPT

## 2023-07-29 PROCEDURE — 36415 COLL VENOUS BLD VENIPUNCTURE: CPT

## 2023-07-29 PROCEDURE — 96365 THER/PROPH/DIAG IV INF INIT: CPT

## 2023-07-29 PROCEDURE — 93005 ELECTROCARDIOGRAM TRACING: CPT | Performed by: STUDENT IN AN ORGANIZED HEALTH CARE EDUCATION/TRAINING PROGRAM

## 2023-07-29 PROCEDURE — 80053 COMPREHEN METABOLIC PANEL: CPT

## 2023-07-29 PROCEDURE — 2580000003 HC RX 258: Performed by: STUDENT IN AN ORGANIZED HEALTH CARE EDUCATION/TRAINING PROGRAM

## 2023-07-29 PROCEDURE — 6370000000 HC RX 637 (ALT 250 FOR IP): Performed by: STUDENT IN AN ORGANIZED HEALTH CARE EDUCATION/TRAINING PROGRAM

## 2023-07-29 PROCEDURE — 83605 ASSAY OF LACTIC ACID: CPT

## 2023-07-29 PROCEDURE — 81001 URINALYSIS AUTO W/SCOPE: CPT

## 2023-07-29 PROCEDURE — 87040 BLOOD CULTURE FOR BACTERIA: CPT

## 2023-07-29 RX ORDER — SODIUM CHLORIDE, SODIUM LACTATE, POTASSIUM CHLORIDE, AND CALCIUM CHLORIDE .6; .31; .03; .02 G/100ML; G/100ML; G/100ML; G/100ML
1000 INJECTION, SOLUTION INTRAVENOUS ONCE
Status: COMPLETED | OUTPATIENT
Start: 2023-07-29 | End: 2023-07-29

## 2023-07-29 RX ORDER — CLOTRIMAZOLE 1 %
CREAM (GRAM) TOPICAL 2 TIMES DAILY
Status: DISCONTINUED | OUTPATIENT
Start: 2023-07-29 | End: 2023-07-30 | Stop reason: HOSPADM

## 2023-07-29 RX ORDER — CEPHALEXIN 500 MG/1
500 CAPSULE ORAL ONCE
Status: COMPLETED | OUTPATIENT
Start: 2023-07-29 | End: 2023-07-29

## 2023-07-29 RX ORDER — ONDANSETRON 2 MG/ML
4 INJECTION INTRAMUSCULAR; INTRAVENOUS ONCE
Status: COMPLETED | OUTPATIENT
Start: 2023-07-29 | End: 2023-07-29

## 2023-07-29 RX ADMIN — SODIUM CHLORIDE, POTASSIUM CHLORIDE, SODIUM LACTATE AND CALCIUM CHLORIDE 1000 ML: 600; 310; 30; 20 INJECTION, SOLUTION INTRAVENOUS at 22:38

## 2023-07-29 RX ADMIN — IOPAMIDOL 65 ML: 755 INJECTION, SOLUTION INTRAVENOUS at 23:22

## 2023-07-29 RX ADMIN — CLOTRIMAZOLE: 10 CREAM TOPICAL at 22:47

## 2023-07-29 RX ADMIN — CEPHALEXIN 500 MG: 500 CAPSULE ORAL at 22:46

## 2023-07-29 RX ADMIN — ONDANSETRON 4 MG: 2 INJECTION INTRAMUSCULAR; INTRAVENOUS at 22:39

## 2023-07-29 RX ADMIN — HYDROMORPHONE HYDROCHLORIDE 1 MG: 1 INJECTION, SOLUTION INTRAMUSCULAR; INTRAVENOUS; SUBCUTANEOUS at 22:39

## 2023-07-29 ASSESSMENT — PAIN DESCRIPTION - FREQUENCY: FREQUENCY: CONTINUOUS

## 2023-07-29 ASSESSMENT — PAIN SCALES - GENERAL: PAINLEVEL_OUTOF10: 10

## 2023-07-29 ASSESSMENT — PAIN - FUNCTIONAL ASSESSMENT: PAIN_FUNCTIONAL_ASSESSMENT: 0-10

## 2023-07-29 ASSESSMENT — PAIN DESCRIPTION - PAIN TYPE: TYPE: ACUTE PAIN

## 2023-07-30 VITALS
TEMPERATURE: 99 F | WEIGHT: 91 LBS | BODY MASS INDEX: 18.35 KG/M2 | HEART RATE: 90 BPM | RESPIRATION RATE: 14 BRPM | DIASTOLIC BLOOD PRESSURE: 63 MMHG | OXYGEN SATURATION: 97 % | SYSTOLIC BLOOD PRESSURE: 101 MMHG | HEIGHT: 59 IN

## 2023-07-30 LAB
EKG ATRIAL RATE: 82 BPM
EKG DIAGNOSIS: NORMAL
EKG P AXIS: 75 DEGREES
EKG P-R INTERVAL: 130 MS
EKG Q-T INTERVAL: 356 MS
EKG QRS DURATION: 70 MS
EKG QTC CALCULATION (BAZETT): 415 MS
EKG R AXIS: 62 DEGREES
EKG T AXIS: 82 DEGREES
EKG VENTRICULAR RATE: 82 BPM
LACTATE BLDV-SCNC: 0.6 MMOL/L (ref 0.4–1.9)

## 2023-07-30 PROCEDURE — 36415 COLL VENOUS BLD VENIPUNCTURE: CPT

## 2023-07-30 PROCEDURE — 2580000003 HC RX 258: Performed by: STUDENT IN AN ORGANIZED HEALTH CARE EDUCATION/TRAINING PROGRAM

## 2023-07-30 PROCEDURE — 6360000002 HC RX W HCPCS: Performed by: STUDENT IN AN ORGANIZED HEALTH CARE EDUCATION/TRAINING PROGRAM

## 2023-07-30 PROCEDURE — 87040 BLOOD CULTURE FOR BACTERIA: CPT

## 2023-07-30 PROCEDURE — 93010 ELECTROCARDIOGRAM REPORT: CPT | Performed by: INTERNAL MEDICINE

## 2023-07-30 PROCEDURE — 83605 ASSAY OF LACTIC ACID: CPT

## 2023-07-30 RX ORDER — PROMETHAZINE HYDROCHLORIDE 25 MG/ML
12.5 INJECTION, SOLUTION INTRAMUSCULAR; INTRAVENOUS ONCE
Status: COMPLETED | OUTPATIENT
Start: 2023-07-30 | End: 2023-07-30

## 2023-07-30 RX ORDER — FENTANYL CITRATE 50 UG/ML
50 INJECTION, SOLUTION INTRAMUSCULAR; INTRAVENOUS ONCE
Status: COMPLETED | OUTPATIENT
Start: 2023-07-30 | End: 2023-07-30

## 2023-07-30 RX ORDER — SODIUM CHLORIDE, SODIUM LACTATE, POTASSIUM CHLORIDE, AND CALCIUM CHLORIDE .6; .31; .03; .02 G/100ML; G/100ML; G/100ML; G/100ML
1000 INJECTION, SOLUTION INTRAVENOUS ONCE
Status: COMPLETED | OUTPATIENT
Start: 2023-07-30 | End: 2023-07-30

## 2023-07-30 RX ORDER — SODIUM CHLORIDE, SODIUM LACTATE, POTASSIUM CHLORIDE, AND CALCIUM CHLORIDE .6; .31; .03; .02 G/100ML; G/100ML; G/100ML; G/100ML
30 INJECTION, SOLUTION INTRAVENOUS ONCE
Status: COMPLETED | OUTPATIENT
Start: 2023-07-30 | End: 2023-07-30

## 2023-07-30 RX ADMIN — CEFEPIME 2000 MG: 2 INJECTION, POWDER, FOR SOLUTION INTRAVENOUS at 00:00

## 2023-07-30 RX ADMIN — FENTANYL CITRATE 50 MCG: 50 INJECTION, SOLUTION INTRAMUSCULAR; INTRAVENOUS at 02:30

## 2023-07-30 RX ADMIN — HYDROMORPHONE HYDROCHLORIDE 1 MG: 1 INJECTION, SOLUTION INTRAMUSCULAR; INTRAVENOUS; SUBCUTANEOUS at 10:47

## 2023-07-30 RX ADMIN — CEFEPIME 2000 MG: 2 INJECTION, POWDER, FOR SOLUTION INTRAVENOUS at 10:52

## 2023-07-30 RX ADMIN — HYDROMORPHONE HYDROCHLORIDE 1 MG: 1 INJECTION, SOLUTION INTRAMUSCULAR; INTRAVENOUS; SUBCUTANEOUS at 15:20

## 2023-07-30 RX ADMIN — HYDROMORPHONE HYDROCHLORIDE 1 MG: 1 INJECTION, SOLUTION INTRAMUSCULAR; INTRAVENOUS; SUBCUTANEOUS at 06:27

## 2023-07-30 RX ADMIN — CLOTRIMAZOLE: 10 CREAM TOPICAL at 10:48

## 2023-07-30 RX ADMIN — VANCOMYCIN HYDROCHLORIDE 1000 MG: 1 INJECTION, POWDER, LYOPHILIZED, FOR SOLUTION INTRAVENOUS at 00:32

## 2023-07-30 RX ADMIN — PROMETHAZINE HYDROCHLORIDE 12.5 MG: 25 INJECTION INTRAMUSCULAR; INTRAVENOUS at 05:46

## 2023-07-30 RX ADMIN — SODIUM CHLORIDE, POTASSIUM CHLORIDE, SODIUM LACTATE AND CALCIUM CHLORIDE 1239 ML: 600; 310; 30; 20 INJECTION, SOLUTION INTRAVENOUS at 01:09

## 2023-07-30 RX ADMIN — SODIUM CHLORIDE, POTASSIUM CHLORIDE, SODIUM LACTATE AND CALCIUM CHLORIDE 1000 ML: 600; 310; 30; 20 INJECTION, SOLUTION INTRAVENOUS at 06:55

## 2023-07-30 ASSESSMENT — PAIN DESCRIPTION - ONSET
ONSET: ON-GOING
ONSET: ON-GOING

## 2023-07-30 ASSESSMENT — PAIN DESCRIPTION - PAIN TYPE
TYPE: CHRONIC PAIN
TYPE: ACUTE PAIN

## 2023-07-30 ASSESSMENT — PAIN DESCRIPTION - DESCRIPTORS
DESCRIPTORS: ACHING
DESCRIPTORS: ACHING

## 2023-07-30 ASSESSMENT — PAIN - FUNCTIONAL ASSESSMENT
PAIN_FUNCTIONAL_ASSESSMENT: 0-10
PAIN_FUNCTIONAL_ASSESSMENT: ACTIVITIES ARE NOT PREVENTED
PAIN_FUNCTIONAL_ASSESSMENT: ACTIVITIES ARE NOT PREVENTED
PAIN_FUNCTIONAL_ASSESSMENT: 0-10
PAIN_FUNCTIONAL_ASSESSMENT: PREVENTS OR INTERFERES SOME ACTIVE ACTIVITIES AND ADLS

## 2023-07-30 ASSESSMENT — PAIN DESCRIPTION - ORIENTATION: ORIENTATION: LOWER

## 2023-07-30 ASSESSMENT — PAIN DESCRIPTION - FREQUENCY
FREQUENCY: CONTINUOUS
FREQUENCY: CONTINUOUS

## 2023-07-30 ASSESSMENT — PAIN DESCRIPTION - LOCATION
LOCATION: ABDOMEN
LOCATION: BACK
LOCATION: BACK

## 2023-07-30 ASSESSMENT — PAIN SCALES - GENERAL
PAINLEVEL_OUTOF10: 5
PAINLEVEL_OUTOF10: 3
PAINLEVEL_OUTOF10: 5
PAINLEVEL_OUTOF10: 10
PAINLEVEL_OUTOF10: 6
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
## 2023-07-30 NOTE — ED NOTES
[facility] called back, patient accepted by Dr. Michelle Mosher to the [facility] emergency room at the [address] [place] [address]  07/30/23 5882

## 2023-07-30 NOTE — ED NOTES
Report given to Catherine Jurado RN at [facility]. No further questions at this time.       Erin Alejandro RN  07/30/23 5388

## 2023-07-30 NOTE — ED NOTES
36- Called [facility] for consult and patient transfer to [facility]  07/30/23 0046    0051- [facility] called with Dr. Catherine Wick on the line who spoke directly to Dr. Rey Montilla  07/30/23 0054    [facility]  called with Dr. Jagdeep Carlos on the line who spoke directly to Dr. Reina Clemons at this time       Clinton Sosa  07/30/23 0154    [facility] Jenna Mathew called with Dr. Sanjuanita Leavitt on the line who spoke directly with Dr. Reina Clemons at this time     Clinton Sosa  07/30/23 0417

## 2023-07-30 NOTE — ED PROVIDER NOTES
Patient signed out to me by Dr. Brian Jane at [phone]. Please refer to his note regarding presentation evaluation up to this point. At the time of signout patient has been accepted for transfer to [facility]. Concern for possible infection of patient's recently implanted baclofen pump. Informed by transfer center that patient was no longer accepted at the [address] due to vital signs of hypotension earlier in the morning. I discussed the case with neurology NP, Jan. I discussed the case in detail with him. I examined the patient at this point I do find that she is markedly tender over the baclofen pump, no erythema around the site, no drainage from the incision. We discussed her labs and CT findings. At this point he recommends transfer to [address] [place] [address]. I then discussed the case with  neurosurgery, Dr. Daryl Bajwa. We again discussed the details of the case and he is requesting ED to ED transfer so he can evaluate the patient in the ED with plans to likely tap the surgery site to evaluate for possible infection. Patient accepted for ED to ED transfer. I did reevaluate the patient multiple times and had to give multiple doses of repeat medication for pain. Redosed her cefepime as it has been 8 hours since her initial dose. Patient did remain hemodynamically stable until transfer to . Critical care    Critical care time 32 minutes exclusive from separate billable procedures that were performed. The following was considered in the determination of critical care but not limited to the level of medical decision making, intensive cardiac and/or respiratory monitoring, frequent vital sign monitoring, evaluation of laboratory studies, evaluation of radiographic studies, oxygen monitoring, and constant monitoring and speaking to family at bedside.      Lien Arguelles,   07/30/23 4379

## 2023-07-30 NOTE — ED NOTES
0665- Patient denied for transfer by [facility], stated they were uncomfortable with her vitals, called [facility] transfer center to try  [place], will consult hospitalist     Liborio Morgan  07/30/23 7853

## 2023-07-30 NOTE — ED NOTES
[facility] access center called, patient is accepted at [address] but must be upgraded to a step down bed, which is not currently available, a bed should be available later today  3506- Confirmed with access center that patient was not denied by [facility], connected with  transfer center to confirm patient is accepted at [address] but only for a step down bed.      Michelle Santiago  07/30/23 0900

## 2023-07-30 NOTE — ED NOTES
Received call from Wade, Charge RN for [employer] at [facility]. States that they will have to represent patient to day shift hospitalist as they are uncomfortable with her blood pressure. Patient states that her blood pressures are always low. Updated [place] of pt alert/oriented x 4, pt ability to assist with sbp 113, pt reported 3 saturated depends overnight. [address] states that patient will need telemetry and no telemetry beds available. Dr. Tika Rocha notifed of need for new transfer.       Narendra Herrera RN  07/30/23 006 [address], RN  07/30/23 9116

## 2023-07-30 NOTE — ED NOTES
[address] [facility] center called, transport with [facility], ETA: 1:20 pm     Michelle Santiago  07/30/23 [address]  07/30/23 6411

## 2023-07-30 NOTE — ED NOTES
Blood culture#1 sent. Waste white tube prior collecting blood culture. Aseptic technique followed.       Driss Harley RN  07/29/23 0153

## 2023-07-30 NOTE — ED NOTES
36 - [facility] called with bed assignment and transport.   Bed - [facility]  ETA - 5540     Nicole Hernandez  07/30/23 8645

## 2023-07-30 NOTE — ED NOTES
Report called to ASHLEY barajas of [facility]. [address] [facility], ASHLEY  07/30/23 0679

## 2023-08-03 LAB — BACTERIA BLD CULT: NORMAL

## 2023-08-04 LAB — BACTERIA BLD CULT ORG #2: NORMAL

## 2024-04-05 NOTE — ED NOTES
.Reviewed discharge instructions with patient. Patient verbalized understanding. No distress noted. Ambulatory from department.      Aram Goodpasture, RN  04/26/21 [id number]
Bed: T3  Expected date:   Expected time:   Means of arrival:   Comments:     Brittany Viveros RN  04/26/21 [id number]
Naima RUBIN at bedside     Joseph Pardo RN  04/26/21 [id number]
cont. bowel regimen
resolved
cont. bowel regimen
resolved
cont. bowel regimen
dietitian/nutrition services

## 2024-10-13 ENCOUNTER — APPOINTMENT (OUTPATIENT)
Dept: GENERAL RADIOLOGY | Age: 34
End: 2024-10-13
Payer: COMMERCIAL

## 2024-10-13 ENCOUNTER — HOSPITAL ENCOUNTER (EMERGENCY)
Age: 34
Discharge: HOME | End: 2024-10-13
Payer: COMMERCIAL

## 2024-10-13 VITALS
HEART RATE: 69 BPM | WEIGHT: 100 LBS | BODY MASS INDEX: 20.54 KG/M2 | TEMPERATURE: 97.9 F | SYSTOLIC BLOOD PRESSURE: 115 MMHG | OXYGEN SATURATION: 99 % | DIASTOLIC BLOOD PRESSURE: 71 MMHG | RESPIRATION RATE: 13 BRPM

## 2024-10-13 DIAGNOSIS — S90.31XA CONTUSION OF RIGHT FOOT, INITIAL ENCOUNTER: Primary | ICD-10-CM

## 2024-10-13 PROCEDURE — 99283 EMERGENCY DEPT VISIT LOW MDM: CPT

## 2024-10-13 PROCEDURE — 73630 X-RAY EXAM OF FOOT: CPT

## 2024-10-13 PROCEDURE — 73610 X-RAY EXAM OF ANKLE: CPT

## 2024-10-13 ASSESSMENT — PAIN DESCRIPTION - LOCATION: LOCATION: FOOT

## 2024-10-13 ASSESSMENT — PAIN DESCRIPTION - ORIENTATION: ORIENTATION: RIGHT

## 2024-10-13 ASSESSMENT — PAIN - FUNCTIONAL ASSESSMENT: PAIN_FUNCTIONAL_ASSESSMENT: 0-10

## 2024-10-13 ASSESSMENT — PAIN DESCRIPTION - PAIN TYPE: TYPE: ACUTE PAIN

## 2024-10-13 ASSESSMENT — PAIN DESCRIPTION - DESCRIPTORS: DESCRIPTORS: PINS AND NEEDLES;THROBBING

## 2024-10-13 ASSESSMENT — PAIN SCALES - GENERAL: PAINLEVEL_OUTOF10: 8
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
## 2024-10-13 NOTE — DISCHARGE INSTRUCTIONS
Discoloration is not as bruising or contusion.  No fracture seen on x-ray of the foot or ankle.  Ace wrap applied.  Recommend ibuprofen or Tylenol as needed.

## 2025-01-15 ENCOUNTER — APPOINTMENT (OUTPATIENT)
Dept: CT IMAGING | Age: 35
End: 2025-01-15
Payer: COMMERCIAL

## 2025-01-15 ENCOUNTER — HOSPITAL ENCOUNTER (INPATIENT)
Age: 35
LOS: 3 days | Discharge: HOME HEALTH | End: 2025-01-18
Attending: STUDENT IN AN ORGANIZED HEALTH CARE EDUCATION/TRAINING PROGRAM | Admitting: STUDENT IN AN ORGANIZED HEALTH CARE EDUCATION/TRAINING PROGRAM
Payer: COMMERCIAL

## 2025-01-15 DIAGNOSIS — T83.511A URINARY TRACT INFECTION ASSOCIATED WITH CATHETERIZATION OF URINARY TRACT, UNSPECIFIED INDWELLING URINARY CATHETER TYPE, INITIAL ENCOUNTER (HCC): ICD-10-CM

## 2025-01-15 DIAGNOSIS — E87.6 HYPOKALEMIA: ICD-10-CM

## 2025-01-15 DIAGNOSIS — L89.229 PRESSURE INJURY OF SKIN OF LEFT THIGH, UNSPECIFIED INJURY STAGE: ICD-10-CM

## 2025-01-15 DIAGNOSIS — A41.9 SEPTICEMIA (HCC): Primary | ICD-10-CM

## 2025-01-15 DIAGNOSIS — N39.0 URINARY TRACT INFECTION ASSOCIATED WITH CATHETERIZATION OF URINARY TRACT, UNSPECIFIED INDWELLING URINARY CATHETER TYPE, INITIAL ENCOUNTER (HCC): ICD-10-CM

## 2025-01-15 DIAGNOSIS — L89.219 PRESSURE INJURY OF SKIN OF RIGHT THIGH, UNSPECIFIED INJURY STAGE: ICD-10-CM

## 2025-01-15 LAB
ALBUMIN SERPL-MCNC: 2.8 G/DL (ref 3.4–5)
ALBUMIN/GLOB SERPL: 0.6 {RATIO} (ref 1.1–2.2)
ALP SERPL-CCNC: 87 U/L (ref 40–129)
ALT SERPL-CCNC: 7 U/L (ref 10–40)
ANION GAP SERPL CALCULATED.3IONS-SCNC: 11 MMOL/L (ref 3–16)
AST SERPL-CCNC: 14 U/L (ref 15–37)
BACTERIA URNS QL MICRO: ABNORMAL /HPF
BASOPHILS # BLD: 0.1 K/UL (ref 0–0.2)
BASOPHILS NFR BLD: 0.6 %
BILIRUB SERPL-MCNC: 0.3 MG/DL (ref 0–1)
BILIRUB UR QL STRIP.AUTO: NEGATIVE
BUN SERPL-MCNC: 14 MG/DL (ref 7–20)
CALCIUM SERPL-MCNC: 8.7 MG/DL (ref 8.3–10.6)
CHLORIDE SERPL-SCNC: 102 MMOL/L (ref 99–110)
CLARITY UR: ABNORMAL
CO2 SERPL-SCNC: 25 MMOL/L (ref 21–32)
COLOR UR: ABNORMAL
CREAT SERPL-MCNC: 0.5 MG/DL (ref 0.6–1.1)
CRP SERPL-MCNC: 113 MG/L (ref 0–5.1)
DEPRECATED RDW RBC AUTO: 16.8 % (ref 12.4–15.4)
EOSINOPHIL # BLD: 0.1 K/UL (ref 0–0.6)
EOSINOPHIL NFR BLD: 0.6 %
EPI CELLS #/AREA URNS HPF: ABNORMAL /HPF (ref 0–5)
ERYTHROCYTE [SEDIMENTATION RATE] IN BLOOD BY WESTERGREN METHOD: 57 MM/HR (ref 0–20)
GFR SERPLBLD CREATININE-BSD FMLA CKD-EPI: >90 ML/MIN/{1.73_M2}
GLUCOSE SERPL-MCNC: 104 MG/DL (ref 70–99)
GLUCOSE UR STRIP.AUTO-MCNC: NEGATIVE MG/DL
HCG SERPL QL: NEGATIVE
HCT VFR BLD AUTO: 32.9 % (ref 36–48)
HGB BLD-MCNC: 10.7 G/DL (ref 12–16)
HGB UR QL STRIP.AUTO: NEGATIVE
KETONES UR STRIP.AUTO-MCNC: ABNORMAL MG/DL
LACTATE BLDV-SCNC: 0.9 MMOL/L (ref 0.4–1.9)
LEUKOCYTE ESTERASE UR QL STRIP.AUTO: ABNORMAL
LYMPHOCYTES # BLD: 2.3 K/UL (ref 1–5.1)
LYMPHOCYTES NFR BLD: 18.8 %
MAGNESIUM SERPL-MCNC: 1.99 MG/DL (ref 1.8–2.4)
MCH RBC QN AUTO: 28.8 PG (ref 26–34)
MCHC RBC AUTO-ENTMCNC: 32.6 G/DL (ref 31–36)
MCV RBC AUTO: 88.6 FL (ref 80–100)
MONOCYTES # BLD: 0.6 K/UL (ref 0–1.3)
MONOCYTES NFR BLD: 4.7 %
MUCOUS THREADS #/AREA URNS LPF: ABNORMAL /LPF
NEUTROPHILS # BLD: 9.1 K/UL (ref 1.7–7.7)
NEUTROPHILS NFR BLD: 75.3 %
NITRITE UR QL STRIP.AUTO: NEGATIVE
PH UR STRIP.AUTO: 6 [PH] (ref 5–8)
PLATELET # BLD AUTO: 405 K/UL (ref 135–450)
PMV BLD AUTO: 8.2 FL (ref 5–10.5)
POTASSIUM SERPL-SCNC: 3.4 MMOL/L (ref 3.5–5.1)
PROCALCITONIN SERPL IA-MCNC: 0.06 NG/ML (ref 0–0.15)
PROT SERPL-MCNC: 7.4 G/DL (ref 6.4–8.2)
PROT UR STRIP.AUTO-MCNC: 100 MG/DL
RBC # BLD AUTO: 3.72 M/UL (ref 4–5.2)
RBC #/AREA URNS HPF: ABNORMAL /HPF (ref 0–4)
SODIUM SERPL-SCNC: 138 MMOL/L (ref 136–145)
SP GR UR STRIP.AUTO: 1.02 (ref 1–1.03)
UA COMPLETE W REFLEX CULTURE PNL UR: YES
UA DIPSTICK W REFLEX MICRO PNL UR: YES
URN SPEC COLLECT METH UR: ABNORMAL
UROBILINOGEN UR STRIP-ACNC: 0.2 E.U./DL
WBC # BLD AUTO: 12.1 K/UL (ref 4–11)
WBC #/AREA URNS HPF: >100 /HPF (ref 0–5)

## 2025-01-15 PROCEDURE — 2580000003 HC RX 258: Performed by: PHYSICIAN ASSISTANT

## 2025-01-15 PROCEDURE — 80053 COMPREHEN METABOLIC PANEL: CPT

## 2025-01-15 PROCEDURE — 87040 BLOOD CULTURE FOR BACTERIA: CPT

## 2025-01-15 PROCEDURE — 6360000002 HC RX W HCPCS: Performed by: PHYSICIAN ASSISTANT

## 2025-01-15 PROCEDURE — 87086 URINE CULTURE/COLONY COUNT: CPT

## 2025-01-15 PROCEDURE — 2580000003 HC RX 258: Performed by: STUDENT IN AN ORGANIZED HEALTH CARE EDUCATION/TRAINING PROGRAM

## 2025-01-15 PROCEDURE — 81001 URINALYSIS AUTO W/SCOPE: CPT

## 2025-01-15 PROCEDURE — 2060000000 HC ICU INTERMEDIATE R&B

## 2025-01-15 PROCEDURE — 84703 CHORIONIC GONADOTROPIN ASSAY: CPT

## 2025-01-15 PROCEDURE — 84145 PROCALCITONIN (PCT): CPT

## 2025-01-15 PROCEDURE — 86140 C-REACTIVE PROTEIN: CPT

## 2025-01-15 PROCEDURE — 83735 ASSAY OF MAGNESIUM: CPT

## 2025-01-15 PROCEDURE — 96375 TX/PRO/DX INJ NEW DRUG ADDON: CPT

## 2025-01-15 PROCEDURE — 96361 HYDRATE IV INFUSION ADD-ON: CPT

## 2025-01-15 PROCEDURE — 6370000000 HC RX 637 (ALT 250 FOR IP): Performed by: PHYSICIAN ASSISTANT

## 2025-01-15 PROCEDURE — 85025 COMPLETE CBC W/AUTO DIFF WBC: CPT

## 2025-01-15 PROCEDURE — 6360000004 HC RX CONTRAST MEDICATION: Performed by: PHYSICIAN ASSISTANT

## 2025-01-15 PROCEDURE — 87077 CULTURE AEROBIC IDENTIFY: CPT

## 2025-01-15 PROCEDURE — 99285 EMERGENCY DEPT VISIT HI MDM: CPT

## 2025-01-15 PROCEDURE — 6360000002 HC RX W HCPCS: Performed by: STUDENT IN AN ORGANIZED HEALTH CARE EDUCATION/TRAINING PROGRAM

## 2025-01-15 PROCEDURE — 87205 SMEAR GRAM STAIN: CPT

## 2025-01-15 PROCEDURE — 85652 RBC SED RATE AUTOMATED: CPT

## 2025-01-15 PROCEDURE — 72193 CT PELVIS W/DYE: CPT

## 2025-01-15 PROCEDURE — 96365 THER/PROPH/DIAG IV INF INIT: CPT

## 2025-01-15 PROCEDURE — 6370000000 HC RX 637 (ALT 250 FOR IP): Performed by: STUDENT IN AN ORGANIZED HEALTH CARE EDUCATION/TRAINING PROGRAM

## 2025-01-15 PROCEDURE — 83605 ASSAY OF LACTIC ACID: CPT

## 2025-01-15 PROCEDURE — 87070 CULTURE OTHR SPECIMN AEROBIC: CPT

## 2025-01-15 PROCEDURE — 2500000003 HC RX 250 WO HCPCS: Performed by: STUDENT IN AN ORGANIZED HEALTH CARE EDUCATION/TRAINING PROGRAM

## 2025-01-15 RX ORDER — DIPHENHYDRAMINE HYDROCHLORIDE 50 MG/ML
25 INJECTION INTRAMUSCULAR; INTRAVENOUS ONCE
Status: COMPLETED | OUTPATIENT
Start: 2025-01-15 | End: 2025-01-15

## 2025-01-15 RX ORDER — LINEZOLID 2 MG/ML
600 INJECTION, SOLUTION INTRAVENOUS EVERY 12 HOURS
Status: DISCONTINUED | OUTPATIENT
Start: 2025-01-16 | End: 2025-01-18

## 2025-01-15 RX ORDER — SODIUM CHLORIDE 0.9 % (FLUSH) 0.9 %
5-40 SYRINGE (ML) INJECTION PRN
Status: DISCONTINUED | OUTPATIENT
Start: 2025-01-15 | End: 2025-01-18 | Stop reason: HOSPADM

## 2025-01-15 RX ORDER — POLYETHYLENE GLYCOL 3350 17 G/17G
17 POWDER, FOR SOLUTION ORAL DAILY PRN
Status: DISCONTINUED | OUTPATIENT
Start: 2025-01-15 | End: 2025-01-18 | Stop reason: HOSPADM

## 2025-01-15 RX ORDER — LINEZOLID 2 MG/ML
600 INJECTION, SOLUTION INTRAVENOUS ONCE
Status: COMPLETED | OUTPATIENT
Start: 2025-01-15 | End: 2025-01-15

## 2025-01-15 RX ORDER — SODIUM CHLORIDE 0.9 % (FLUSH) 0.9 %
5-40 SYRINGE (ML) INJECTION EVERY 12 HOURS SCHEDULED
Status: DISCONTINUED | OUTPATIENT
Start: 2025-01-15 | End: 2025-01-18 | Stop reason: HOSPADM

## 2025-01-15 RX ORDER — SODIUM CHLORIDE 9 MG/ML
INJECTION, SOLUTION INTRAVENOUS PRN
Status: DISCONTINUED | OUTPATIENT
Start: 2025-01-15 | End: 2025-01-18 | Stop reason: HOSPADM

## 2025-01-15 RX ORDER — ACETAMINOPHEN 650 MG/1
650 SUPPOSITORY RECTAL EVERY 6 HOURS PRN
Status: DISCONTINUED | OUTPATIENT
Start: 2025-01-15 | End: 2025-01-18 | Stop reason: HOSPADM

## 2025-01-15 RX ORDER — 0.9 % SODIUM CHLORIDE 0.9 %
1000 INTRAVENOUS SOLUTION INTRAVENOUS ONCE
Status: COMPLETED | OUTPATIENT
Start: 2025-01-15 | End: 2025-01-15

## 2025-01-15 RX ORDER — ACETAMINOPHEN 325 MG/1
650 TABLET ORAL EVERY 6 HOURS PRN
Status: DISCONTINUED | OUTPATIENT
Start: 2025-01-15 | End: 2025-01-18 | Stop reason: HOSPADM

## 2025-01-15 RX ORDER — POTASSIUM CHLORIDE 1500 MG/1
40 TABLET, EXTENDED RELEASE ORAL ONCE
Status: COMPLETED | OUTPATIENT
Start: 2025-01-15 | End: 2025-01-15

## 2025-01-15 RX ORDER — HYDROMORPHONE HYDROCHLORIDE 1 MG/ML
1 INJECTION, SOLUTION INTRAMUSCULAR; INTRAVENOUS; SUBCUTANEOUS ONCE
Status: COMPLETED | OUTPATIENT
Start: 2025-01-15 | End: 2025-01-15

## 2025-01-15 RX ORDER — GABAPENTIN 400 MG/1
400 CAPSULE ORAL NIGHTLY
Status: DISCONTINUED | OUTPATIENT
Start: 2025-01-15 | End: 2025-01-18 | Stop reason: HOSPADM

## 2025-01-15 RX ORDER — ONDANSETRON 4 MG/1
4 TABLET, ORALLY DISINTEGRATING ORAL EVERY 8 HOURS PRN
Status: DISCONTINUED | OUTPATIENT
Start: 2025-01-15 | End: 2025-01-16

## 2025-01-15 RX ORDER — ONDANSETRON 2 MG/ML
4 INJECTION INTRAMUSCULAR; INTRAVENOUS ONCE
Status: COMPLETED | OUTPATIENT
Start: 2025-01-15 | End: 2025-01-15

## 2025-01-15 RX ORDER — BACLOFEN 20 MG/1
20 TABLET ORAL 2 TIMES DAILY PRN
COMMUNITY

## 2025-01-15 RX ORDER — VANCOMYCIN 1 G/200ML
25 INJECTION, SOLUTION INTRAVENOUS ONCE
Status: DISCONTINUED | OUTPATIENT
Start: 2025-01-15 | End: 2025-01-15

## 2025-01-15 RX ORDER — FENTANYL CITRATE 50 UG/ML
50 INJECTION, SOLUTION INTRAMUSCULAR; INTRAVENOUS ONCE
Status: COMPLETED | OUTPATIENT
Start: 2025-01-15 | End: 2025-01-15

## 2025-01-15 RX ORDER — ONDANSETRON 2 MG/ML
4 INJECTION INTRAMUSCULAR; INTRAVENOUS EVERY 6 HOURS PRN
Status: DISCONTINUED | OUTPATIENT
Start: 2025-01-15 | End: 2025-01-16

## 2025-01-15 RX ORDER — IOPAMIDOL 755 MG/ML
75 INJECTION, SOLUTION INTRAVASCULAR
Status: COMPLETED | OUTPATIENT
Start: 2025-01-15 | End: 2025-01-15

## 2025-01-15 RX ADMIN — SODIUM CHLORIDE 1000 ML: 9 INJECTION, SOLUTION INTRAVENOUS at 16:46

## 2025-01-15 RX ADMIN — POTASSIUM CHLORIDE 40 MEQ: 1500 TABLET, EXTENDED RELEASE ORAL at 16:22

## 2025-01-15 RX ADMIN — GABAPENTIN 400 MG: 400 CAPSULE ORAL at 22:21

## 2025-01-15 RX ADMIN — PIPERACILLIN AND TAZOBACTAM 3375 MG: 3; .375 INJECTION, POWDER, LYOPHILIZED, FOR SOLUTION INTRAVENOUS at 22:27

## 2025-01-15 RX ADMIN — ONDANSETRON 4 MG: 2 INJECTION, SOLUTION INTRAMUSCULAR; INTRAVENOUS at 16:20

## 2025-01-15 RX ADMIN — CEFEPIME 2000 MG: 2 INJECTION, POWDER, FOR SOLUTION INTRAVENOUS at 16:48

## 2025-01-15 RX ADMIN — DIPHENHYDRAMINE HYDROCHLORIDE 25 MG: 50 INJECTION INTRAMUSCULAR; INTRAVENOUS at 18:07

## 2025-01-15 RX ADMIN — SODIUM CHLORIDE: 9 INJECTION, SOLUTION INTRAVENOUS at 22:24

## 2025-01-15 RX ADMIN — SODIUM CHLORIDE 1000 ML: 9 INJECTION, SOLUTION INTRAVENOUS at 15:14

## 2025-01-15 RX ADMIN — FENTANYL CITRATE 50 MCG: 50 INJECTION INTRAMUSCULAR; INTRAVENOUS at 16:21

## 2025-01-15 RX ADMIN — VANCOMYCIN HYDROCHLORIDE 1000 MG: 1 INJECTION, POWDER, LYOPHILIZED, FOR SOLUTION INTRAVENOUS at 17:46

## 2025-01-15 RX ADMIN — SODIUM CHLORIDE, PRESERVATIVE FREE 10 ML: 5 INJECTION INTRAVENOUS at 22:21

## 2025-01-15 RX ADMIN — HYDROMORPHONE HYDROCHLORIDE 1 MG: 1 INJECTION, SOLUTION INTRAMUSCULAR; INTRAVENOUS; SUBCUTANEOUS at 21:11

## 2025-01-15 RX ADMIN — ONDANSETRON 4 MG: 2 INJECTION, SOLUTION INTRAMUSCULAR; INTRAVENOUS at 21:10

## 2025-01-15 RX ADMIN — IOPAMIDOL 75 ML: 755 INJECTION, SOLUTION INTRAVENOUS at 17:16

## 2025-01-15 RX ADMIN — LINEZOLID 600 MG: 600 INJECTION, SOLUTION INTRAVENOUS at 18:42

## 2025-01-15 ASSESSMENT — PAIN SCALES - GENERAL
PAINLEVEL_OUTOF10: 6
PAINLEVEL_OUTOF10: 9
PAINLEVEL_OUTOF10: 8
PAINLEVEL_OUTOF10: 8

## 2025-01-15 ASSESSMENT — LIFESTYLE VARIABLES
HOW MANY STANDARD DRINKS CONTAINING ALCOHOL DO YOU HAVE ON A TYPICAL DAY: PATIENT DOES NOT DRINK
HOW OFTEN DO YOU HAVE A DRINK CONTAINING ALCOHOL: NEVER

## 2025-01-15 NOTE — ED NOTES
Patient reports itching to both arms 10 minutes after IV vancomycin started, infusion stopped, provider aware and 25mg of benadryl ordered.
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
## 2025-01-16 LAB
ALBUMIN SERPL-MCNC: 2.1 G/DL (ref 3.4–5)
ALBUMIN/GLOB SERPL: 0.4 {RATIO} (ref 1.1–2.2)
ALP SERPL-CCNC: 72 U/L (ref 40–129)
ALT SERPL-CCNC: 7 U/L (ref 10–40)
ANION GAP SERPL CALCULATED.3IONS-SCNC: 8 MMOL/L (ref 3–16)
AST SERPL-CCNC: 13 U/L (ref 15–37)
BACTERIA UR CULT: NORMAL
BASOPHILS # BLD: 0.1 K/UL (ref 0–0.2)
BASOPHILS NFR BLD: 0.5 %
BILIRUB SERPL-MCNC: <0.2 MG/DL (ref 0–1)
BUN SERPL-MCNC: 8 MG/DL (ref 7–20)
CALCIUM SERPL-MCNC: 8.1 MG/DL (ref 8.3–10.6)
CHLORIDE SERPL-SCNC: 105 MMOL/L (ref 99–110)
CO2 SERPL-SCNC: 20 MMOL/L (ref 21–32)
CREAT SERPL-MCNC: 0.3 MG/DL (ref 0.6–1.1)
DEPRECATED RDW RBC AUTO: 16.8 % (ref 12.4–15.4)
EOSINOPHIL # BLD: 0.1 K/UL (ref 0–0.6)
EOSINOPHIL NFR BLD: 0.8 %
GFR SERPLBLD CREATININE-BSD FMLA CKD-EPI: >90 ML/MIN/{1.73_M2}
GLUCOSE SERPL-MCNC: 95 MG/DL (ref 70–99)
HCT VFR BLD AUTO: 31.1 % (ref 36–48)
HGB BLD-MCNC: 10.1 G/DL (ref 12–16)
LYMPHOCYTES # BLD: 2.3 K/UL (ref 1–5.1)
LYMPHOCYTES NFR BLD: 17.2 %
MCH RBC QN AUTO: 29.3 PG (ref 26–34)
MCHC RBC AUTO-ENTMCNC: 32.6 G/DL (ref 31–36)
MCV RBC AUTO: 90 FL (ref 80–100)
MONOCYTES # BLD: 0.9 K/UL (ref 0–1.3)
MONOCYTES NFR BLD: 7.1 %
NEUTROPHILS # BLD: 9.8 K/UL (ref 1.7–7.7)
NEUTROPHILS NFR BLD: 74.4 %
PLATELET # BLD AUTO: 364 K/UL (ref 135–450)
PMV BLD AUTO: 8.8 FL (ref 5–10.5)
POTASSIUM SERPL-SCNC: 4.6 MMOL/L (ref 3.5–5.1)
PROT SERPL-MCNC: 6.8 G/DL (ref 6.4–8.2)
RBC # BLD AUTO: 3.46 M/UL (ref 4–5.2)
SODIUM SERPL-SCNC: 133 MMOL/L (ref 136–145)
WBC # BLD AUTO: 13.1 K/UL (ref 4–11)

## 2025-01-16 PROCEDURE — 2580000003 HC RX 258: Performed by: STUDENT IN AN ORGANIZED HEALTH CARE EDUCATION/TRAINING PROGRAM

## 2025-01-16 PROCEDURE — 6370000000 HC RX 637 (ALT 250 FOR IP): Performed by: STUDENT IN AN ORGANIZED HEALTH CARE EDUCATION/TRAINING PROGRAM

## 2025-01-16 PROCEDURE — 80053 COMPREHEN METABOLIC PANEL: CPT

## 2025-01-16 PROCEDURE — 36415 COLL VENOUS BLD VENIPUNCTURE: CPT

## 2025-01-16 PROCEDURE — 6360000002 HC RX W HCPCS: Performed by: NURSE PRACTITIONER

## 2025-01-16 PROCEDURE — APPNB60 APP NON BILLABLE TIME 46-60 MINS: Performed by: CLINICAL NURSE SPECIALIST

## 2025-01-16 PROCEDURE — 85025 COMPLETE CBC W/AUTO DIFF WBC: CPT

## 2025-01-16 PROCEDURE — 6370000000 HC RX 637 (ALT 250 FOR IP): Performed by: NURSE PRACTITIONER

## 2025-01-16 PROCEDURE — 99232 SBSQ HOSP IP/OBS MODERATE 35: CPT | Performed by: SURGERY

## 2025-01-16 PROCEDURE — 6360000002 HC RX W HCPCS: Performed by: STUDENT IN AN ORGANIZED HEALTH CARE EDUCATION/TRAINING PROGRAM

## 2025-01-16 PROCEDURE — 2500000003 HC RX 250 WO HCPCS: Performed by: STUDENT IN AN ORGANIZED HEALTH CARE EDUCATION/TRAINING PROGRAM

## 2025-01-16 PROCEDURE — 2060000000 HC ICU INTERMEDIATE R&B

## 2025-01-16 PROCEDURE — 51701 INSERT BLADDER CATHETER: CPT

## 2025-01-16 RX ORDER — BACLOFEN 10 MG/1
20 TABLET ORAL 2 TIMES DAILY PRN
Status: DISCONTINUED | OUTPATIENT
Start: 2025-01-16 | End: 2025-01-18 | Stop reason: HOSPADM

## 2025-01-16 RX ORDER — PROCHLORPERAZINE EDISYLATE 5 MG/ML
5 INJECTION INTRAMUSCULAR; INTRAVENOUS EVERY 6 HOURS PRN
Status: DISCONTINUED | OUTPATIENT
Start: 2025-01-16 | End: 2025-01-18 | Stop reason: HOSPADM

## 2025-01-16 RX ORDER — TRAMADOL HYDROCHLORIDE 50 MG/1
50 TABLET ORAL EVERY 6 HOURS PRN
Status: DISCONTINUED | OUTPATIENT
Start: 2025-01-16 | End: 2025-01-17

## 2025-01-16 RX ADMIN — TRAMADOL HYDROCHLORIDE 50 MG: 50 TABLET, COATED ORAL at 09:58

## 2025-01-16 RX ADMIN — HYDROMORPHONE HYDROCHLORIDE 0.25 MG: 1 INJECTION, SOLUTION INTRAMUSCULAR; INTRAVENOUS; SUBCUTANEOUS at 13:00

## 2025-01-16 RX ADMIN — PROCHLORPERAZINE EDISYLATE 5 MG: 5 INJECTION INTRAMUSCULAR; INTRAVENOUS at 13:00

## 2025-01-16 RX ADMIN — BACLOFEN 20 MG: 10 TABLET ORAL at 02:25

## 2025-01-16 RX ADMIN — ACETAMINOPHEN 650 MG: 325 TABLET ORAL at 02:25

## 2025-01-16 RX ADMIN — HYDROMORPHONE HYDROCHLORIDE 0.25 MG: 1 INJECTION, SOLUTION INTRAMUSCULAR; INTRAVENOUS; SUBCUTANEOUS at 20:14

## 2025-01-16 RX ADMIN — LINEZOLID 600 MG: 600 INJECTION, SOLUTION INTRAVENOUS at 10:00

## 2025-01-16 RX ADMIN — PIPERACILLIN AND TAZOBACTAM 3375 MG: 3; .375 INJECTION, POWDER, LYOPHILIZED, FOR SOLUTION INTRAVENOUS at 14:39

## 2025-01-16 RX ADMIN — TRAMADOL HYDROCHLORIDE 50 MG: 50 TABLET, COATED ORAL at 02:52

## 2025-01-16 RX ADMIN — LINEZOLID 600 MG: 600 INJECTION, SOLUTION INTRAVENOUS at 20:12

## 2025-01-16 RX ADMIN — ONDANSETRON 4 MG: 2 INJECTION, SOLUTION INTRAMUSCULAR; INTRAVENOUS at 11:23

## 2025-01-16 RX ADMIN — PROCHLORPERAZINE EDISYLATE 5 MG: 5 INJECTION INTRAMUSCULAR; INTRAVENOUS at 19:01

## 2025-01-16 RX ADMIN — GABAPENTIN 400 MG: 400 CAPSULE ORAL at 20:08

## 2025-01-16 RX ADMIN — TRAMADOL HYDROCHLORIDE 50 MG: 50 TABLET, COATED ORAL at 17:32

## 2025-01-16 RX ADMIN — PIPERACILLIN AND TAZOBACTAM 3375 MG: 3; .375 INJECTION, POWDER, LYOPHILIZED, FOR SOLUTION INTRAVENOUS at 21:31

## 2025-01-16 RX ADMIN — BACLOFEN 20 MG: 10 TABLET ORAL at 20:15

## 2025-01-16 RX ADMIN — PIPERACILLIN AND TAZOBACTAM 3375 MG: 3; .375 INJECTION, POWDER, LYOPHILIZED, FOR SOLUTION INTRAVENOUS at 06:10

## 2025-01-16 RX ADMIN — SODIUM CHLORIDE, PRESERVATIVE FREE 10 ML: 5 INJECTION INTRAVENOUS at 20:08

## 2025-01-16 RX ADMIN — SODIUM CHLORIDE, PRESERVATIVE FREE 10 ML: 5 INJECTION INTRAVENOUS at 10:00

## 2025-01-16 ASSESSMENT — PAIN SCALES - GENERAL
PAINLEVEL_OUTOF10: 7
PAINLEVEL_OUTOF10: 9
PAINLEVEL_OUTOF10: 0
PAINLEVEL_OUTOF10: 10
PAINLEVEL_OUTOF10: 0
PAINLEVEL_OUTOF10: 6
PAINLEVEL_OUTOF10: 8
PAINLEVEL_OUTOF10: 3

## 2025-01-16 ASSESSMENT — PAIN DESCRIPTION - ORIENTATION
ORIENTATION: LEFT;RIGHT
ORIENTATION: LEFT;RIGHT
ORIENTATION: RIGHT;LEFT
ORIENTATION: LEFT;RIGHT
ORIENTATION: LEFT;RIGHT

## 2025-01-16 ASSESSMENT — PAIN DESCRIPTION - DESCRIPTORS
DESCRIPTORS: ACHING
DESCRIPTORS: ACHING

## 2025-01-16 ASSESSMENT — PAIN DESCRIPTION - LOCATION
LOCATION: BUTTOCKS
LOCATION: BUTTOCKS;BACK
LOCATION: BUTTOCKS

## 2025-01-16 ASSESSMENT — PAIN - FUNCTIONAL ASSESSMENT
PAIN_FUNCTIONAL_ASSESSMENT: ACTIVITIES ARE NOT PREVENTED
PAIN_FUNCTIONAL_ASSESSMENT: ACTIVITIES ARE NOT PREVENTED

## 2025-01-16 NOTE — CARE COORDINATION
Case Management Assessment  Initial Evaluation    Date/Time of Evaluation: 1/16/2025 4:15 PM  Assessment Completed by: Lorraine Multani RN    If patient is discharged prior to next notation, then this note serves as note for discharge by case management.    Patient Name: Ruby Pena                   YOB: 1990  Diagnosis: Hypokalemia [E87.6]  Septicemia (HCC) [A41.9]  Sepsis (HCC) [A41.9]  Urinary tract infection associated with catheterization of urinary tract, unspecified indwelling urinary catheter type, initial encounter ([facility]) [T83.511A, N39.0]  Pressure injury of skin of left thigh, unspecified injury stage [L89.229]  Pressure injury of skin of right thigh, unspecified injury stage [L89.219]                   Date / Time: 1/15/2025  2:28 PM    Patient Admission Status: Inpatient   Readmission Risk (Low < 19, Mod (19-27), High > 27): Readmission Risk Score: 12.7    Current PCP: Tiki Cartrwight APRN - CNP  PCP verified by CM? Yes    Chart Reviewed: Yes      History Provided by: Patient  Patient Orientation: Alert and Oriented    Patient Cognition: Alert    Hospitalization in the last 30 days (Readmission):  No    If yes, Readmission Assessment in CM Navigator will be completed.    Advance Directives:      Code Status: Full Code   Patient's Primary Decision Maker is: Legal Next of Kin    Primary Decision Maker: Emmy menon - Parent - [phone]    Discharge Planning:    Patient lives with: Children (8yo lives with her  / 12 yo lives with father most of time) Type of Home: Apartment  Primary Care Giver: Self  Patient Support Systems include: Parent   Current Financial resources: Medicaid  Current community resources: Other (Comment) ([facility] - RN 3xwk)  Current services prior to admission: Home Care            Current DME:              Type of Home Care services:  Skilled Therapy (wound care RN/[facility] 3xwk)    ADLS  Prior functional level: Independent in ADLs/IADLs  Current

## 2025-01-16 NOTE — CONSULTS
Gen surg message center notified of stat consult @ 1814 with night shift RN's callback number given. Kaylah Henao RN    No callback received. Called message center a 2nd time @ [phone].

## 2025-01-16 NOTE — ACP (ADVANCE CARE PLANNING)
Advance Care Planning   Advance Care Planning Clinical Specialist  Conversation Note      Date of ACP Conversation: 1/16/2025    Conversation Conducted with:   Patient with Decision Making Capacity   Healthcare Decision Maker: Next of Kin by law (only applies in absence of above) mother as below    ACP Clinical Specialist: Lorraine Multani, RN      *When Decision Maker makes decisions on behalf of the incapacitated patient: Decision Maker is asked to consider and make decisions based on patient values, known preferences, or best interests.       Current Designated Health Care Decision Maker:   Primary Decision Maker: menonNikki mosleyEmmy - Parent - [phone]  (as entered in ACP Activity Healthcare Decision Maker field.  Validate  this information as still accurate & up-to-date; edit Healthcare Decision Maker field as needed.)       For below questions, when conducting conversation with Health Care Decision Maker, substitute \"she\" and \"her\" for \"you\" and \"your\".      Hospitalization  If your health were to worsen and it became clear that your chance of recovery was unlikely, what would your preferences be regarding hospitalization?:    Choice:  [x]  The patient would want hospitalization  []  The patient would prefer comfort-focused treatment without hospitalization.    Ventilation  If you were in your present state of health and suddenly became very ill and were unable to breathe on your own, what would your preference be about the use of a ventilator (breathing machine) if it were available to you?      If patient would desire the use of a ventilator (breathing machine), answer \"yes\", if not \"no\":yes    If your health were to worsen and it became clear that your chance of recovery was unlikely, would that change your answer? Yes    Resuscitation  CPR works best to restart the heart when there is a sudden event, like a heart attack, in someone who is otherwise healthy. Unfortunately, CPR does not typically restart the
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
## 2025-01-16 NOTE — PROGRESS NOTES
Dr. Krueger with gen surgery called this RN back, states will see patient in the AM. Electronically signed by Salome Gregory RN on 1/16/2025 at 1:17 AM

## 2025-01-16 NOTE — ED NOTES
Ruby Pena is a 34 y.o. female admitted for  Principal Problem:    Sepsis (HCC)  Resolved Problems:    * No resolved hospital problems. *  .   Patient Home via self with   Chief Complaint   Patient presents with    Wound Check     Pt states she has two bed sores and is on two abx.   Pt states she is on bactrim and doxycycline.   Pt states all her care is at .    .  Patient is alert and Person, Place, Time, and Situation  Patient's baseline mobility: Baseline Mobility: Independent but in wheelchair, paralyzed from waist down  Code Status: Full Code   Cardiac Rhythm:       Is patient on baseline Oxygen: no how many Liters  Abnormal Assessment Findings: Pressure wounds on both sides of buttocks    Isolation: None      NIH Score:    C-SSRS: Risk of Suicide: No Risk  Bedside swallow:        Active LDA's:   Peripheral IV 01/15/25 Right Antecubital (Active)       Peripheral IV 01/15/25 Right;Anterior Cephalic (Active)     Patient admitted with a dowd: no If the dowd is chronic was it exchanged:No pt straight caths   Reason for dowd:         Family/Caregiver Present no Any Concerns: no   Restraints no  Sitter no         Vitals: MEWS Score: 4    Vitals:    01/15/25 2029 01/15/25 2042 01/15/25 2058 01/15/25 2113   BP: 124/76 118/79 113/71 116/71   Pulse: 68 73 73 71   Resp: 20 23 24 18   Temp:       TempSrc:       SpO2: 100% 100% 98% 100%   Weight:       Height:           Last documented pain score (0-10 scale) Pain Level: 9  Pain medication administered Yes- see MAR.    Pertinent or High Risk Medications/Drips: No.    Pending Blood Product Administration: no    Abnormal labs:   Abnormal Labs Reviewed   CBC WITH AUTO DIFFERENTIAL - Abnormal; Notable for the following components:       Result Value    WBC 12.1 (*)     RBC 3.72 (*)     Hemoglobin 10.7 (*)     Hematocrit 32.9 (*)     RDW 16.8 (*)     Neutrophils Absolute 9.1 (*)     All other components within normal limits   COMPREHENSIVE METABOLIC PANEL W/ REFLEX TO

## 2025-01-16 NOTE — PROGRESS NOTES
4 Eyes Skin Assessment     NAME:  Ruby Pena  YOB: 1990  MEDICAL RECORD NUMBER:  [id number]    The patient is being assessed for  Admission    I agree that at least one RN has performed a thorough Head to Toe Skin Assessment on the patient. ALL assessment sites listed below have been assessed.      Areas assessed by both nurses:    Head, Face, Ears, Shoulders, Back, Chest, Arms, Elbows, Hands, Sacrum. Buttock, Coccyx, Ischium, and Legs. Feet and Heels        Does the Patient have a Wound? Yes wound(s) were present on assessment. LDA wound assessment was Initiated and completed by RN       Tom Prevention initiated by RN: Yes  Wound Care Orders initiated by RN: Yes    Pressure Injury (Stage 3,4, Unstageable, DTI, NWPT, and Complex wounds) if present, place Wound referral order by RN under : Yes    New Ostomies, if present place, Ostomy referral order under : Yes     Nurse 1 eSignature: Electronically signed by Salome Gregory RN on 1/15/25 at 10:10 PM EST    **SHARE this note so that the co-signing nurse can place an eSignature**    Nurse 2 eSignature: Electronically signed by Kris Rodríguez RN on 1/15/25 at 10:29 PM EST
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
## 2025-01-16 NOTE — PROGRESS NOTES
Comprehensive Nutrition Assessment    Type and Reason for Visit:  Initial, Wound    Nutrition Recommendations/Plan:   Continue NPO (advance as medically feasible per MD).   Monitor nutrition adequacy, pertinent labs, bowel habits, wt changes, and clinical progress      Malnutrition Assessment:  Malnutrition Status:  At risk for malnutrition (01/16/25 1352)    Context:  Acute Illness     Findings of the 6 clinical characteristics of malnutrition:  Energy Intake:  Mild decrease in energy intake  Weight Loss:  Unable to assess (limited recent hx and mainly \"stated\" collection methods)     Body Fat Loss:  Unable to assess (blanket pulled over pt)     Muscle Mass Loss:  Unable to assess (blanket pulled over pt)    Fluid Accumulation:  No fluid accumulation     Strength:  Not Performed    Nutrition Assessment:    Pt visited for wounds. Admitted for chronic wound evaluation but found to have sepsis. PMH of paraplegia and chronic wounds (typically follows [facility] for care). Currently NPO. Pt reports her appetite is poor today d/t nausea but that it was good prior. States she eats 3 meals/day plus snacks at baseline. Denies unintentional wt loss or gain from her reported UBW of 90-95 lbs. ERIC wts in EMR d/t limited recent hx and mainly \"stated\" collection methods. Disagreeable to all ONS at this time. Encouraged PO intake as tolerated and additional protein/calorie intake to promote wound healing - pt verbalized understanding. Reports nausea/emesis but denies diarrhea/constipation. Will continue to monitor.    Nutrition Related Findings:    No BM documented this admit. Active BS. Cr: 0.3. No edema. Wound Type: Pressure Injury, Stage III (buttocks)       Current Nutrition Intake & Therapies:    Average Meal Intake: NPO  Average Supplements Intake: NPO  Diet NPO    Anthropometric Measures:  Height: 147.3 cm (4' 9.99\")  Ideal Body Weight (IBW): 90 lbs (41 kg)    Current Body Weight: 41.7 kg (91 lb 14.9 oz), 102.1 % IBW. Weight

## 2025-01-16 NOTE — ED NOTES
Full bed change, strait cath performed, purewick and warm blankets placed. Pt resting on side at this time.

## 2025-01-17 LAB
ANION GAP SERPL CALCULATED.3IONS-SCNC: 5 MMOL/L (ref 3–16)
BACTERIA SPEC AEROBE CULT: ABNORMAL
BASOPHILS # BLD: 0 K/UL (ref 0–0.2)
BASOPHILS NFR BLD: 0.4 %
BUN SERPL-MCNC: 4 MG/DL (ref 7–20)
CALCIUM SERPL-MCNC: 7.7 MG/DL (ref 8.3–10.6)
CHLORIDE SERPL-SCNC: 104 MMOL/L (ref 99–110)
CO2 SERPL-SCNC: 24 MMOL/L (ref 21–32)
CREAT SERPL-MCNC: 0.4 MG/DL (ref 0.6–1.1)
DEPRECATED RDW RBC AUTO: 16.6 % (ref 12.4–15.4)
EOSINOPHIL # BLD: 0.1 K/UL (ref 0–0.6)
EOSINOPHIL NFR BLD: 0.9 %
GFR SERPLBLD CREATININE-BSD FMLA CKD-EPI: >90 ML/MIN/{1.73_M2}
GLUCOSE SERPL-MCNC: 94 MG/DL (ref 70–99)
GRAM STN SPEC: ABNORMAL
HCT VFR BLD AUTO: 33.9 % (ref 36–48)
HGB BLD-MCNC: 11 G/DL (ref 12–16)
LYMPHOCYTES # BLD: 1.8 K/UL (ref 1–5.1)
LYMPHOCYTES NFR BLD: 15.9 %
MCH RBC QN AUTO: 29 PG (ref 26–34)
MCHC RBC AUTO-ENTMCNC: 32.5 G/DL (ref 31–36)
MCV RBC AUTO: 89.4 FL (ref 80–100)
MONOCYTES # BLD: 0.8 K/UL (ref 0–1.3)
MONOCYTES NFR BLD: 7.2 %
NEUTROPHILS # BLD: 8.6 K/UL (ref 1.7–7.7)
NEUTROPHILS NFR BLD: 75.6 %
ORGANISM: ABNORMAL
PLATELET # BLD AUTO: 398 K/UL (ref 135–450)
PMV BLD AUTO: 8.7 FL (ref 5–10.5)
POTASSIUM SERPL-SCNC: 4.7 MMOL/L (ref 3.5–5.1)
RBC # BLD AUTO: 3.79 M/UL (ref 4–5.2)
REASON FOR REJECTION: NORMAL
REJECTED TEST: NORMAL
SODIUM SERPL-SCNC: 133 MMOL/L (ref 136–145)
WBC # BLD AUTO: 11.4 K/UL (ref 4–11)

## 2025-01-17 PROCEDURE — 2060000000 HC ICU INTERMEDIATE R&B

## 2025-01-17 PROCEDURE — 2500000003 HC RX 250 WO HCPCS: Performed by: STUDENT IN AN ORGANIZED HEALTH CARE EDUCATION/TRAINING PROGRAM

## 2025-01-17 PROCEDURE — 36415 COLL VENOUS BLD VENIPUNCTURE: CPT

## 2025-01-17 PROCEDURE — 6360000002 HC RX W HCPCS: Performed by: NURSE PRACTITIONER

## 2025-01-17 PROCEDURE — 6360000002 HC RX W HCPCS: Performed by: STUDENT IN AN ORGANIZED HEALTH CARE EDUCATION/TRAINING PROGRAM

## 2025-01-17 PROCEDURE — 6370000000 HC RX 637 (ALT 250 FOR IP): Performed by: NURSE PRACTITIONER

## 2025-01-17 PROCEDURE — 6370000000 HC RX 637 (ALT 250 FOR IP): Performed by: STUDENT IN AN ORGANIZED HEALTH CARE EDUCATION/TRAINING PROGRAM

## 2025-01-17 PROCEDURE — 80048 BASIC METABOLIC PNL TOTAL CA: CPT

## 2025-01-17 PROCEDURE — 2580000003 HC RX 258: Performed by: STUDENT IN AN ORGANIZED HEALTH CARE EDUCATION/TRAINING PROGRAM

## 2025-01-17 PROCEDURE — 51701 INSERT BLADDER CATHETER: CPT

## 2025-01-17 PROCEDURE — 85025 COMPLETE CBC W/AUTO DIFF WBC: CPT

## 2025-01-17 RX ORDER — ENOXAPARIN SODIUM 100 MG/ML
30 INJECTION SUBCUTANEOUS DAILY
Status: DISCONTINUED | OUTPATIENT
Start: 2025-01-17 | End: 2025-01-18 | Stop reason: HOSPADM

## 2025-01-17 RX ORDER — OXYCODONE HYDROCHLORIDE 5 MG/1
5 TABLET ORAL EVERY 6 HOURS PRN
Status: COMPLETED | OUTPATIENT
Start: 2025-01-17 | End: 2025-01-18

## 2025-01-17 RX ORDER — LACTOBACILLUS RHAMNOSUS GG 10B CELL
1 CAPSULE ORAL
Status: DISCONTINUED | OUTPATIENT
Start: 2025-01-17 | End: 2025-01-18 | Stop reason: HOSPADM

## 2025-01-17 RX ADMIN — HYDROMORPHONE HYDROCHLORIDE 0.25 MG: 1 INJECTION, SOLUTION INTRAMUSCULAR; INTRAVENOUS; SUBCUTANEOUS at 09:54

## 2025-01-17 RX ADMIN — HYDROMORPHONE HYDROCHLORIDE 0.25 MG: 1 INJECTION, SOLUTION INTRAMUSCULAR; INTRAVENOUS; SUBCUTANEOUS at 05:22

## 2025-01-17 RX ADMIN — HYDROMORPHONE HYDROCHLORIDE 0.25 MG: 1 INJECTION, SOLUTION INTRAMUSCULAR; INTRAVENOUS; SUBCUTANEOUS at 16:28

## 2025-01-17 RX ADMIN — Medication 1 CAPSULE: at 11:57

## 2025-01-17 RX ADMIN — LINEZOLID 600 MG: 600 INJECTION, SOLUTION INTRAVENOUS at 21:54

## 2025-01-17 RX ADMIN — OXYCODONE 5 MG: 5 TABLET ORAL at 21:43

## 2025-01-17 RX ADMIN — GABAPENTIN 400 MG: 400 CAPSULE ORAL at 21:45

## 2025-01-17 RX ADMIN — PIPERACILLIN AND TAZOBACTAM 3375 MG: 3; .375 INJECTION, POWDER, LYOPHILIZED, FOR SOLUTION INTRAVENOUS at 15:35

## 2025-01-17 RX ADMIN — PROCHLORPERAZINE EDISYLATE 5 MG: 5 INJECTION INTRAMUSCULAR; INTRAVENOUS at 16:28

## 2025-01-17 RX ADMIN — BACLOFEN 20 MG: 10 TABLET ORAL at 12:56

## 2025-01-17 RX ADMIN — HYDROMORPHONE HYDROCHLORIDE 0.25 MG: 1 INJECTION, SOLUTION INTRAMUSCULAR; INTRAVENOUS; SUBCUTANEOUS at 12:57

## 2025-01-17 RX ADMIN — LINEZOLID 600 MG: 600 INJECTION, SOLUTION INTRAVENOUS at 09:46

## 2025-01-17 RX ADMIN — SODIUM CHLORIDE, PRESERVATIVE FREE 10 ML: 5 INJECTION INTRAVENOUS at 09:46

## 2025-01-17 RX ADMIN — PIPERACILLIN AND TAZOBACTAM 3375 MG: 3; .375 INJECTION, POWDER, LYOPHILIZED, FOR SOLUTION INTRAVENOUS at 05:24

## 2025-01-17 ASSESSMENT — PAIN DESCRIPTION - DESCRIPTORS
DESCRIPTORS: ACHING

## 2025-01-17 ASSESSMENT — PAIN DESCRIPTION - LOCATION
LOCATION: BUTTOCKS

## 2025-01-17 ASSESSMENT — PAIN SCALES - GENERAL
PAINLEVEL_OUTOF10: 3
PAINLEVEL_OUTOF10: 10
PAINLEVEL_OUTOF10: 0
PAINLEVEL_OUTOF10: 7
PAINLEVEL_OUTOF10: 4
PAINLEVEL_OUTOF10: 0
PAINLEVEL_OUTOF10: 8
PAINLEVEL_OUTOF10: 0

## 2025-01-17 ASSESSMENT — PAIN DESCRIPTION - ORIENTATION
ORIENTATION: RIGHT;LEFT
ORIENTATION: LEFT;RIGHT
ORIENTATION: RIGHT;LEFT

## 2025-01-17 NOTE — PLAN OF CARE
Problem: Discharge Planning  Goal: Discharge to home or other facility with appropriate resources  1/17/2025 1845 by Kavon Kemp RN  Outcome: Progressing  1/17/2025 0548 by Salome Gregory RN  Outcome: Progressing     Problem: Pain  Goal: Verbalizes/displays adequate comfort level or baseline comfort level  1/17/2025 1845 by Kavon Kemp RN  Outcome: Progressing  1/17/2025 0548 by Salome Gregory RN  Outcome: Progressing     Problem: Safety - Adult  Goal: Free from fall injury  1/17/2025 1845 by Kavon Kemp RN  Outcome: Progressing  1/17/2025 0548 by Salome Gregory RN  Outcome: Progressing     Problem: Skin/Tissue Integrity  Goal: Absence of new skin breakdown  Description: 1.  Monitor for areas of redness and/or skin breakdown  2.  Assess vascular access sites hourly  3.  Every 4-6 hours minimum:  Change oxygen saturation probe site  4.  Every 4-6 hours:  If on nasal continuous positive airway pressure, respiratory therapy assess nares and determine need for appliance change or resting period.  1/17/2025 1845 by Kavon Kemp RN  Outcome: Progressing  1/17/2025 0548 by Salome Gregory RN  Outcome: Progressing     Problem: Nutrition Deficit:  Goal: Optimize nutritional status  1/17/2025 1845 by Kavon Kemp RN  Outcome: Progressing  1/17/2025 0548 by Salome Gregory RN  Outcome: Progressing
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
## 2025-01-17 NOTE — DISCHARGE INSTRUCTIONS
Wound Care  Recommendation  BID (twice a day) cleanse bi lateral Ischium with Vashe solution (blue bottal) pat dry.  Apply Gauze moisten with Vashe into wound, then cover with dry guaze or ABD pad.    Recommend place negative pressure wound therapy back on bi lateral Ischiums.  Recommend using a white  sponge under shelving with black over the white.    Routine ostomy care - [facility] 01/17/25  Change pouch every 3-5  days & PRN. Empty when 1/3 to 1/2 full of stool or gas.     Supplies:  Green Erica flat [id number]   Coloplast ring  Green Hollist pouch [id number]  Directions:   Remove pouch, cleanse skin with warm water, dry thoroughly.   Measure stoma & cut wafer to fit snuggly around base of stoma  30mm  Place barrier ring around stoma then cover with wafer.  Cover wafer with pouch  Place hand over pouch 2-3 minutes to warm.      Wound care check with UC in 3-5 days
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
## 2025-01-18 VITALS
SYSTOLIC BLOOD PRESSURE: 108 MMHG | HEIGHT: 58 IN | TEMPERATURE: 98.2 F | DIASTOLIC BLOOD PRESSURE: 69 MMHG | RESPIRATION RATE: 16 BRPM | BODY MASS INDEX: 17.49 KG/M2 | WEIGHT: 83.33 LBS | OXYGEN SATURATION: 99 % | HEART RATE: 91 BPM

## 2025-01-18 LAB
ANION GAP SERPL CALCULATED.3IONS-SCNC: 8 MMOL/L (ref 3–16)
BASOPHILS # BLD: 0 K/UL (ref 0–0.2)
BASOPHILS NFR BLD: 0.4 %
BUN SERPL-MCNC: 4 MG/DL (ref 7–20)
CALCIUM SERPL-MCNC: 8.1 MG/DL (ref 8.3–10.6)
CHLORIDE SERPL-SCNC: 104 MMOL/L (ref 99–110)
CO2 SERPL-SCNC: 25 MMOL/L (ref 21–32)
CREAT SERPL-MCNC: 0.4 MG/DL (ref 0.6–1.1)
DEPRECATED RDW RBC AUTO: 16.9 % (ref 12.4–15.4)
EOSINOPHIL # BLD: 0.1 K/UL (ref 0–0.6)
EOSINOPHIL NFR BLD: 0.8 %
GFR SERPLBLD CREATININE-BSD FMLA CKD-EPI: >90 ML/MIN/{1.73_M2}
GLUCOSE SERPL-MCNC: 104 MG/DL (ref 70–99)
HCT VFR BLD AUTO: 28.4 % (ref 36–48)
HGB BLD-MCNC: 9.4 G/DL (ref 12–16)
LYMPHOCYTES # BLD: 2.3 K/UL (ref 1–5.1)
LYMPHOCYTES NFR BLD: 22.7 %
MCH RBC QN AUTO: 29.1 PG (ref 26–34)
MCHC RBC AUTO-ENTMCNC: 33 G/DL (ref 31–36)
MCV RBC AUTO: 88.1 FL (ref 80–100)
MONOCYTES # BLD: 0.9 K/UL (ref 0–1.3)
MONOCYTES NFR BLD: 8.9 %
NEUTROPHILS # BLD: 6.8 K/UL (ref 1.7–7.7)
NEUTROPHILS NFR BLD: 67.2 %
PLATELET # BLD AUTO: 312 K/UL (ref 135–450)
PMV BLD AUTO: 8.4 FL (ref 5–10.5)
POTASSIUM SERPL-SCNC: 4.3 MMOL/L (ref 3.5–5.1)
RBC # BLD AUTO: 3.22 M/UL (ref 4–5.2)
SODIUM SERPL-SCNC: 137 MMOL/L (ref 136–145)
WBC # BLD AUTO: 10.1 K/UL (ref 4–11)

## 2025-01-18 PROCEDURE — 80048 BASIC METABOLIC PNL TOTAL CA: CPT

## 2025-01-18 PROCEDURE — 2500000003 HC RX 250 WO HCPCS: Performed by: STUDENT IN AN ORGANIZED HEALTH CARE EDUCATION/TRAINING PROGRAM

## 2025-01-18 PROCEDURE — 6370000000 HC RX 637 (ALT 250 FOR IP): Performed by: NURSE PRACTITIONER

## 2025-01-18 PROCEDURE — 85025 COMPLETE CBC W/AUTO DIFF WBC: CPT

## 2025-01-18 PROCEDURE — 6360000002 HC RX W HCPCS: Performed by: NURSE PRACTITIONER

## 2025-01-18 PROCEDURE — 87641 MR-STAPH DNA AMP PROBE: CPT

## 2025-01-18 PROCEDURE — 51701 INSERT BLADDER CATHETER: CPT

## 2025-01-18 RX ORDER — LINEZOLID 600 MG/1
600 TABLET, FILM COATED ORAL EVERY 12 HOURS SCHEDULED
Status: DISCONTINUED | OUTPATIENT
Start: 2025-01-18 | End: 2025-01-18 | Stop reason: HOSPADM

## 2025-01-18 RX ORDER — LACTOBACILLUS RHAMNOSUS GG 10B CELL
1 CAPSULE ORAL
Qty: 10 CAPSULE | Refills: 0 | Status: SHIPPED | OUTPATIENT
Start: 2025-01-19 | End: 2025-01-29

## 2025-01-18 RX ORDER — LINEZOLID 600 MG/1
600 TABLET, FILM COATED ORAL EVERY 12 HOURS SCHEDULED
Qty: 8 TABLET | Refills: 0 | Status: SHIPPED | OUTPATIENT
Start: 2025-01-18 | End: 2025-01-22

## 2025-01-18 RX ADMIN — LINEZOLID 600 MG: 600 TABLET, FILM COATED ORAL at 10:18

## 2025-01-18 RX ADMIN — ENOXAPARIN SODIUM 30 MG: 100 INJECTION SUBCUTANEOUS at 10:20

## 2025-01-18 RX ADMIN — OXYCODONE 5 MG: 5 TABLET ORAL at 05:20

## 2025-01-18 RX ADMIN — BACLOFEN 20 MG: 10 TABLET ORAL at 01:45

## 2025-01-18 RX ADMIN — SODIUM CHLORIDE, PRESERVATIVE FREE 10 ML: 5 INJECTION INTRAVENOUS at 10:20

## 2025-01-18 RX ADMIN — ENOXAPARIN SODIUM 30 MG: 100 INJECTION SUBCUTANEOUS at 01:45

## 2025-01-18 RX ADMIN — Medication 1 CAPSULE: at 10:19

## 2025-01-18 RX ADMIN — OXYCODONE 5 MG: 5 TABLET ORAL at 10:18

## 2025-01-18 RX ADMIN — SODIUM CHLORIDE, PRESERVATIVE FREE 10 ML: 5 INJECTION INTRAVENOUS at 01:45

## 2025-01-18 ASSESSMENT — PAIN SCALES - GENERAL
PAINLEVEL_OUTOF10: 0
PAINLEVEL_OUTOF10: 7
PAINLEVEL_OUTOF10: 10

## 2025-01-18 ASSESSMENT — PAIN DESCRIPTION - LOCATION
LOCATION: BUTTOCKS
LOCATION: HIP

## 2025-01-18 ASSESSMENT — PAIN - FUNCTIONAL ASSESSMENT: PAIN_FUNCTIONAL_ASSESSMENT: ACTIVITIES ARE NOT PREVENTED

## 2025-01-18 ASSESSMENT — PAIN DESCRIPTION - DESCRIPTORS
DESCRIPTORS: ACHING
DESCRIPTORS: THROBBING

## 2025-01-18 ASSESSMENT — PAIN DESCRIPTION - ORIENTATION: ORIENTATION: RIGHT;LEFT

## 2025-01-18 NOTE — CARE COORDINATION
CASE MANAGEMENT DISCHARGE SUMMARY      Discharge to: home with [facility]      New Durable Medical Equipment ordered/agency: na    Transportation:    Family/car: private    Confirmed discharge plan with: RN, [facility]     Patient: yes/no     Family:  yes/no    Name: Contact number:     Facility/Agency, name:  [facility] faxed [phone]   Phone number for report to facility:      RN, name: Corine     Note: Discharging nurse to complete [facility], reconcile [facility], and place final copy with patient's discharge packet. RN to ensure that written prescriptions for  Level II medications are sent with patient to the facility as per protocol.

## 2025-01-18 NOTE — DISCHARGE INSTR - COC
Continuity of Care Form    Patient Name: Ruby Pena   :  1990  MRN:  [id number]    Admit date:  1/15/2025  Discharge date:  ***2025    Code Status Order: Full Code   Advance Directives:   Advance Care Flowsheet Documentation             Admitting Physician:  Daniel Velazquez DO  PCP: Tiki Cartwright, APRN - CNP    Discharging Nurse:   Discharging Hospital Unit/Room#: 0433/0433-01  Discharging Unit Phone Number: [phone]    Emergency Contact:   Extended Emergency Contact Information  Primary Emergency Contact: Emmy menon  [place]  Home Phone: [phone]  Relation: Parent    Past Surgical History:  Past Surgical History:   Procedure Laterality Date    BACLOFEN PUMP IMPLANTATION      COLOSTOMY  2019    ENDOSCOPY, COLON, DIAGNOSTIC      LAPAROTOMY EXPLORATORY N/A 2019    LAPAROTOMY EXPLORATORY, SIGMOID COLETIOMY AND COLOSTOMY performed by Abel Mabry MD at [facility] OR    TUBAL LIGATION      UPPER GASTROINTESTINAL ENDOSCOPY N/A 2022    EGD BIOPSY performed by Claudio Gardner MD at [facility] ENDOSCOPY       Immunization History:   Immunization History   Administered Date(s) Administered    PPD Test 2017, 2017    TDaP, ADACEL (age 10y-64y), BOOSTRIX (age 10y+), IM, 0.5mL 2013       Active Problems:  Patient Active Problem List   Diagnosis Code    S/P partial colectomy Z90.49    Perforated viscus R19.8    Acute hypoxemic respiratory failure J96.01    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

## 2025-01-18 NOTE — PLAN OF CARE
Problem: Discharge Planning  Goal: Discharge to home or other facility with appropriate resources  Outcome: Not Progressing   Will be seen by ID today and they will decide if pt. Can be d/c'd.
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
## 2025-01-18 NOTE — PLAN OF CARE
Problem: Discharge Planning  Goal: Discharge to home or other facility with appropriate resources  1/18/2025 1108 by Corine Zapata RN  Outcome: Progressing  1/18/2025 1012 by Lorraine Davidson RN  Outcome: Not Progressing     Problem: Pain  Goal: Verbalizes/displays adequate comfort level or baseline comfort level  1/18/2025 1108 by Corine Zapata RN  Outcome: Progressing  1/18/2025 1012 by Lorraine Davidson RN  Outcome: Progressing     Problem: Safety - Adult  Goal: Free from fall injury  1/18/2025 1108 by Corine Zapata RN  Outcome: Progressing  1/18/2025 1012 by Lorraine Davidson RN  Outcome: Progressing     Problem: Skin/Tissue Integrity  Goal: Absence of new skin breakdown  Description: 1.  Monitor for areas of redness and/or skin breakdown  2.  Assess vascular access sites hourly  3.  Every 4-6 hours minimum:  Change oxygen saturation probe site  4.  Every 4-6 hours:  If on nasal continuous positive airway pressure, respiratory therapy assess nares and determine need for appliance change or resting period.  Outcome: Progressing     Problem: Nutrition Deficit:  Goal: Optimize nutritional status  Outcome: Progressing     Problem: Discharge Planning  Goal: Discharge to home or other facility with appropriate resources  1/18/2025 1108 by Corine Zapata RN  Outcome: Progressing  1/18/2025 1012 by Lorraine Davidson RN  Outcome: Not Progressing

## 2025-01-19 LAB
BACTERIA BLD CULT ORG #2: NORMAL
BACTERIA BLD CULT: NORMAL
MRSA DNA SPEC QL NAA+PROBE: NORMAL

## 2025-04-04 ENCOUNTER — HOSPITAL ENCOUNTER (EMERGENCY)
Age: 35
Discharge: HOME | End: 2025-04-04
Payer: COMMERCIAL

## 2025-04-04 ENCOUNTER — APPOINTMENT (OUTPATIENT)
Dept: CT IMAGING | Age: 35
End: 2025-04-04
Payer: COMMERCIAL

## 2025-04-04 VITALS
TEMPERATURE: 98.3 F | OXYGEN SATURATION: 99 % | SYSTOLIC BLOOD PRESSURE: 104 MMHG | HEART RATE: 80 BPM | BODY MASS INDEX: 21.53 KG/M2 | WEIGHT: 103 LBS | RESPIRATION RATE: 16 BRPM | DIASTOLIC BLOOD PRESSURE: 58 MMHG

## 2025-04-04 DIAGNOSIS — R10.84 GENERALIZED ABDOMINAL PAIN: Primary | ICD-10-CM

## 2025-04-04 DIAGNOSIS — T83.511A URINARY TRACT INFECTION ASSOCIATED WITH CATHETERIZATION OF URINARY TRACT, UNSPECIFIED INDWELLING URINARY CATHETER TYPE, INITIAL ENCOUNTER: ICD-10-CM

## 2025-04-04 DIAGNOSIS — N39.0 URINARY TRACT INFECTION ASSOCIATED WITH CATHETERIZATION OF URINARY TRACT, UNSPECIFIED INDWELLING URINARY CATHETER TYPE, INITIAL ENCOUNTER: ICD-10-CM

## 2025-04-04 LAB
ALBUMIN SERPL-MCNC: 3.5 G/DL (ref 3.4–5)
ALBUMIN/GLOB SERPL: 0.7 {RATIO} (ref 1.1–2.2)
ALP SERPL-CCNC: 80 U/L (ref 40–129)
ALT SERPL-CCNC: 12 U/L (ref 10–40)
ANION GAP SERPL CALCULATED.3IONS-SCNC: 9 MMOL/L (ref 3–16)
AST SERPL-CCNC: 16 U/L (ref 15–37)
BACTERIA URNS QL MICRO: ABNORMAL /HPF
BASOPHILS # BLD: 0.1 K/UL (ref 0–0.2)
BASOPHILS NFR BLD: 0.4 %
BILIRUB SERPL-MCNC: <0.2 MG/DL (ref 0–1)
BILIRUB UR QL STRIP.AUTO: NEGATIVE
BUN SERPL-MCNC: 15 MG/DL (ref 7–20)
CALCIUM SERPL-MCNC: 9.1 MG/DL (ref 8.3–10.6)
CHLORIDE SERPL-SCNC: 106 MMOL/L (ref 99–110)
CLARITY UR: CLEAR
CO2 SERPL-SCNC: 26 MMOL/L (ref 21–32)
COLOR UR: YELLOW
CREAT SERPL-MCNC: 0.5 MG/DL (ref 0.6–1.1)
DEPRECATED RDW RBC AUTO: 20.8 % (ref 12.4–15.4)
EOSINOPHIL # BLD: 0.3 K/UL (ref 0–0.6)
EOSINOPHIL NFR BLD: 2.2 %
EPI CELLS #/AREA URNS HPF: ABNORMAL /HPF (ref 0–5)
GFR SERPLBLD CREATININE-BSD FMLA CKD-EPI: >90 ML/MIN/{1.73_M2}
GLUCOSE SERPL-MCNC: 109 MG/DL (ref 70–99)
GLUCOSE UR STRIP.AUTO-MCNC: NEGATIVE MG/DL
HCG SERPL QL: NEGATIVE
HCT VFR BLD AUTO: 35.1 % (ref 36–48)
HGB BLD-MCNC: 11.5 G/DL (ref 12–16)
HGB UR QL STRIP.AUTO: ABNORMAL
KETONES UR STRIP.AUTO-MCNC: NEGATIVE MG/DL
LACTATE BLDV-SCNC: 0.9 MMOL/L (ref 0.4–2)
LEUKOCYTE ESTERASE UR QL STRIP.AUTO: ABNORMAL
LIPASE SERPL-CCNC: 31 U/L (ref 13–60)
LYMPHOCYTES # BLD: 2.6 K/UL (ref 1–5.1)
LYMPHOCYTES NFR BLD: 17.4 %
MCH RBC QN AUTO: 27.5 PG (ref 26–34)
MCHC RBC AUTO-ENTMCNC: 32.7 G/DL (ref 31–36)
MCV RBC AUTO: 84.2 FL (ref 80–100)
MONOCYTES # BLD: 1 K/UL (ref 0–1.3)
MONOCYTES NFR BLD: 6.6 %
MUCOUS THREADS #/AREA URNS LPF: ABNORMAL /LPF
NEUTROPHILS # BLD: 10.8 K/UL (ref 1.7–7.7)
NEUTROPHILS NFR BLD: 73.4 %
NITRITE UR QL STRIP.AUTO: NEGATIVE
PH UR STRIP.AUTO: 6 [PH] (ref 5–8)
PLATELET # BLD AUTO: 402 K/UL (ref 135–450)
PMV BLD AUTO: 8.2 FL (ref 5–10.5)
POTASSIUM SERPL-SCNC: 4.2 MMOL/L (ref 3.5–5.1)
PROT SERPL-MCNC: 8.3 G/DL (ref 6.4–8.2)
PROT UR STRIP.AUTO-MCNC: 30 MG/DL
RBC # BLD AUTO: 4.18 M/UL (ref 4–5.2)
RBC #/AREA URNS HPF: ABNORMAL /HPF (ref 0–4)
SODIUM SERPL-SCNC: 141 MMOL/L (ref 136–145)
SP GR UR STRIP.AUTO: 1.02 (ref 1–1.03)
UA COMPLETE W REFLEX CULTURE PNL UR: YES
UA DIPSTICK W REFLEX MICRO PNL UR: YES
URN SPEC COLLECT METH UR: ABNORMAL
UROBILINOGEN UR STRIP-ACNC: 0.2 E.U./DL
WBC # BLD AUTO: 14.7 K/UL (ref 4–11)
WBC #/AREA URNS HPF: ABNORMAL /HPF (ref 0–5)

## 2025-04-04 PROCEDURE — 74177 CT ABD & PELVIS W/CONTRAST: CPT

## 2025-04-04 PROCEDURE — 87077 CULTURE AEROBIC IDENTIFY: CPT

## 2025-04-04 PROCEDURE — 84703 CHORIONIC GONADOTROPIN ASSAY: CPT

## 2025-04-04 PROCEDURE — 96374 THER/PROPH/DIAG INJ IV PUSH: CPT

## 2025-04-04 PROCEDURE — 83690 ASSAY OF LIPASE: CPT

## 2025-04-04 PROCEDURE — 85025 COMPLETE CBC W/AUTO DIFF WBC: CPT

## 2025-04-04 PROCEDURE — 6360000004 HC RX CONTRAST MEDICATION: Performed by: PHYSICIAN ASSISTANT

## 2025-04-04 PROCEDURE — 87186 SC STD MICRODIL/AGAR DIL: CPT

## 2025-04-04 PROCEDURE — 80053 COMPREHEN METABOLIC PANEL: CPT

## 2025-04-04 PROCEDURE — 81001 URINALYSIS AUTO W/SCOPE: CPT

## 2025-04-04 PROCEDURE — 87086 URINE CULTURE/COLONY COUNT: CPT

## 2025-04-04 PROCEDURE — 6370000000 HC RX 637 (ALT 250 FOR IP): Performed by: PHYSICIAN ASSISTANT

## 2025-04-04 PROCEDURE — 83605 ASSAY OF LACTIC ACID: CPT

## 2025-04-04 PROCEDURE — 99285 EMERGENCY DEPT VISIT HI MDM: CPT

## 2025-04-04 PROCEDURE — 6360000002 HC RX W HCPCS: Performed by: PHYSICIAN ASSISTANT

## 2025-04-04 RX ORDER — OXYCODONE AND ACETAMINOPHEN 5; 325 MG/1; MG/1
1 TABLET ORAL ONCE
Refills: 0 | Status: COMPLETED | OUTPATIENT
Start: 2025-04-04 | End: 2025-04-04

## 2025-04-04 RX ORDER — CEFDINIR 300 MG/1
300 CAPSULE ORAL ONCE
Status: COMPLETED | OUTPATIENT
Start: 2025-04-04 | End: 2025-04-04

## 2025-04-04 RX ORDER — ONDANSETRON 2 MG/ML
4 INJECTION INTRAMUSCULAR; INTRAVENOUS ONCE
Status: COMPLETED | OUTPATIENT
Start: 2025-04-04 | End: 2025-04-04

## 2025-04-04 RX ORDER — IOPAMIDOL 755 MG/ML
75 INJECTION, SOLUTION INTRAVASCULAR
Status: COMPLETED | OUTPATIENT
Start: 2025-04-04 | End: 2025-04-04

## 2025-04-04 RX ORDER — CEFDINIR 300 MG/1
300 CAPSULE ORAL 2 TIMES DAILY
Qty: 14 CAPSULE | Refills: 0 | Status: SHIPPED | OUTPATIENT
Start: 2025-04-04 | End: 2025-04-11

## 2025-04-04 RX ADMIN — IOPAMIDOL 75 ML: 755 INJECTION, SOLUTION INTRAVENOUS at 14:42

## 2025-04-04 RX ADMIN — ONDANSETRON 4 MG: 2 INJECTION, SOLUTION INTRAMUSCULAR; INTRAVENOUS at 13:47

## 2025-04-04 RX ADMIN — CEFDINIR 300 MG: 300 CAPSULE ORAL at 16:11

## 2025-04-04 RX ADMIN — OXYCODONE AND ACETAMINOPHEN 1 TABLET: 5; 325 TABLET ORAL at 13:47

## 2025-04-04 ASSESSMENT — PAIN - FUNCTIONAL ASSESSMENT: PAIN_FUNCTIONAL_ASSESSMENT: 0-10

## 2025-04-04 ASSESSMENT — PAIN SCALES - GENERAL
PAINLEVEL_OUTOF10: 9
PAINLEVEL_OUTOF10: 9

## 2025-04-04 ASSESSMENT — PAIN DESCRIPTION - LOCATION
LOCATION: ABDOMEN
LOCATION: ABDOMEN
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
## 2025-04-04 NOTE — ED NOTES
Written and verbal discharge provided to patient, patient verbalizes understanding. IV removed, no complications, dressing applied. Patient assisted back into wheelchair, GCS 15, no acute signs or symptoms of distress. Patient discharged at this time with ride.

## 2025-04-06 ENCOUNTER — RESULTS FOLLOW-UP (OUTPATIENT)
Dept: EMERGENCY DEPT | Age: 35
End: 2025-04-06

## 2025-04-06 LAB
BACTERIA UR CULT: ABNORMAL
ORGANISM: ABNORMAL

## 2025-04-06 NOTE — RESULT ENCOUNTER NOTE
Culture reviewed, culture is positive but resistant to antibiotics prescribed at discharge.  Antibiotic needs to be changed to ciprofloxacin 250mg twice daily for 3 days.

## 2025-05-05 ENCOUNTER — APPOINTMENT (OUTPATIENT)
Dept: CT IMAGING | Age: 35
End: 2025-05-05
Payer: COMMERCIAL

## 2025-05-05 ENCOUNTER — HOSPITAL ENCOUNTER (EMERGENCY)
Age: 35
Discharge: HOME | End: 2025-05-05
Payer: COMMERCIAL

## 2025-05-05 VITALS
BODY MASS INDEX: 20.99 KG/M2 | WEIGHT: 100 LBS | HEIGHT: 58 IN | RESPIRATION RATE: 16 BRPM | SYSTOLIC BLOOD PRESSURE: 115 MMHG | HEART RATE: 60 BPM | TEMPERATURE: 98.3 F | DIASTOLIC BLOOD PRESSURE: 77 MMHG | OXYGEN SATURATION: 99 %

## 2025-05-05 DIAGNOSIS — Z86.69 HISTORY OF PARAPLEGIA: ICD-10-CM

## 2025-05-05 DIAGNOSIS — N10 ACUTE PYELONEPHRITIS: Primary | ICD-10-CM

## 2025-05-05 LAB
ALBUMIN SERPL-MCNC: 3.5 G/DL (ref 3.4–5)
ALBUMIN/GLOB SERPL: 0.8 {RATIO} (ref 1.1–2.2)
ALP SERPL-CCNC: 84 U/L (ref 40–129)
ALT SERPL-CCNC: 9 U/L (ref 10–40)
ANION GAP SERPL CALCULATED.3IONS-SCNC: 7 MMOL/L (ref 3–16)
AST SERPL-CCNC: 16 U/L (ref 15–37)
BACTERIA URNS QL MICRO: ABNORMAL /HPF
BASOPHILS # BLD: 0.1 K/UL (ref 0–0.2)
BASOPHILS NFR BLD: 0.9 %
BILIRUB SERPL-MCNC: <0.2 MG/DL (ref 0–1)
BILIRUB UR QL STRIP.AUTO: NEGATIVE
BUN SERPL-MCNC: 15 MG/DL (ref 7–20)
CALCIUM SERPL-MCNC: 9 MG/DL (ref 8.3–10.6)
CHLORIDE SERPL-SCNC: 105 MMOL/L (ref 99–110)
CLARITY UR: CLEAR
CO2 SERPL-SCNC: 26 MMOL/L (ref 21–32)
COLOR UR: YELLOW
CREAT SERPL-MCNC: 0.5 MG/DL (ref 0.6–1.1)
DEPRECATED RDW RBC AUTO: 20.8 % (ref 12.4–15.4)
EOSINOPHIL # BLD: 0.3 K/UL (ref 0–0.6)
EOSINOPHIL NFR BLD: 3.5 %
EPI CELLS #/AREA URNS HPF: ABNORMAL /HPF (ref 0–5)
GFR SERPLBLD CREATININE-BSD FMLA CKD-EPI: >90 ML/MIN/{1.73_M2}
GLUCOSE SERPL-MCNC: 80 MG/DL (ref 70–99)
GLUCOSE UR STRIP.AUTO-MCNC: NEGATIVE MG/DL
HCG UR QL: NEGATIVE
HCT VFR BLD AUTO: 35.6 % (ref 36–48)
HGB BLD-MCNC: 11.8 G/DL (ref 12–16)
HGB UR QL STRIP.AUTO: ABNORMAL
KETONES UR STRIP.AUTO-MCNC: NEGATIVE MG/DL
LACTATE BLDV-SCNC: 0.8 MMOL/L (ref 0.4–1.9)
LEUKOCYTE ESTERASE UR QL STRIP.AUTO: ABNORMAL
LIPASE SERPL-CCNC: 38 U/L (ref 13–60)
LYMPHOCYTES # BLD: 2.6 K/UL (ref 1–5.1)
LYMPHOCYTES NFR BLD: 28.6 %
MCH RBC QN AUTO: 28.1 PG (ref 26–34)
MCHC RBC AUTO-ENTMCNC: 33.2 G/DL (ref 31–36)
MCV RBC AUTO: 84.6 FL (ref 80–100)
MONOCYTES # BLD: 0.5 K/UL (ref 0–1.3)
MONOCYTES NFR BLD: 5.3 %
MUCOUS THREADS #/AREA URNS LPF: ABNORMAL /LPF
NEUTROPHILS # BLD: 5.6 K/UL (ref 1.7–7.7)
NEUTROPHILS NFR BLD: 61.7 %
NITRITE UR QL STRIP.AUTO: NEGATIVE
PH UR STRIP.AUTO: 6.5 [PH] (ref 5–8)
PLATELET # BLD AUTO: 334 K/UL (ref 135–450)
PMV BLD AUTO: 8.4 FL (ref 5–10.5)
POTASSIUM SERPL-SCNC: 3.9 MMOL/L (ref 3.5–5.1)
PROT SERPL-MCNC: 7.8 G/DL (ref 6.4–8.2)
PROT UR STRIP.AUTO-MCNC: NEGATIVE MG/DL
RBC # BLD AUTO: 4.21 M/UL (ref 4–5.2)
RBC #/AREA URNS HPF: ABNORMAL /HPF (ref 0–4)
SODIUM SERPL-SCNC: 138 MMOL/L (ref 136–145)
SP GR UR STRIP.AUTO: 1.02 (ref 1–1.03)
UA COMPLETE W REFLEX CULTURE PNL UR: ABNORMAL
UA DIPSTICK W REFLEX MICRO PNL UR: YES
URN SPEC COLLECT METH UR: ABNORMAL
UROBILINOGEN UR STRIP-ACNC: 0.2 E.U./DL
WBC # BLD AUTO: 9.1 K/UL (ref 4–11)
WBC #/AREA URNS HPF: ABNORMAL /HPF (ref 0–5)

## 2025-05-05 PROCEDURE — 96374 THER/PROPH/DIAG INJ IV PUSH: CPT

## 2025-05-05 PROCEDURE — 6360000002 HC RX W HCPCS: Performed by: PHYSICIAN ASSISTANT

## 2025-05-05 PROCEDURE — 6360000004 HC RX CONTRAST MEDICATION: Performed by: PHYSICIAN ASSISTANT

## 2025-05-05 PROCEDURE — 96375 TX/PRO/DX INJ NEW DRUG ADDON: CPT

## 2025-05-05 PROCEDURE — 83690 ASSAY OF LIPASE: CPT

## 2025-05-05 PROCEDURE — 87086 URINE CULTURE/COLONY COUNT: CPT

## 2025-05-05 PROCEDURE — 85025 COMPLETE CBC W/AUTO DIFF WBC: CPT

## 2025-05-05 PROCEDURE — 81001 URINALYSIS AUTO W/SCOPE: CPT

## 2025-05-05 PROCEDURE — 83605 ASSAY OF LACTIC ACID: CPT

## 2025-05-05 PROCEDURE — 84703 CHORIONIC GONADOTROPIN ASSAY: CPT

## 2025-05-05 PROCEDURE — 99285 EMERGENCY DEPT VISIT HI MDM: CPT

## 2025-05-05 PROCEDURE — 74177 CT ABD & PELVIS W/CONTRAST: CPT

## 2025-05-05 PROCEDURE — 6370000000 HC RX 637 (ALT 250 FOR IP): Performed by: PHYSICIAN ASSISTANT

## 2025-05-05 PROCEDURE — 36415 COLL VENOUS BLD VENIPUNCTURE: CPT

## 2025-05-05 PROCEDURE — 80053 COMPREHEN METABOLIC PANEL: CPT

## 2025-05-05 RX ORDER — ONDANSETRON 4 MG/1
4 TABLET, FILM COATED ORAL EVERY 8 HOURS PRN
Qty: 20 TABLET | Refills: 0 | Status: SHIPPED | OUTPATIENT
Start: 2025-05-05

## 2025-05-05 RX ORDER — MORPHINE SULFATE 2 MG/ML
2 INJECTION, SOLUTION INTRAMUSCULAR; INTRAVENOUS ONCE
Status: COMPLETED | OUTPATIENT
Start: 2025-05-05 | End: 2025-05-05

## 2025-05-05 RX ORDER — CIPROFLOXACIN 500 MG/1
500 TABLET, FILM COATED ORAL 2 TIMES DAILY
Qty: 14 TABLET | Refills: 0 | Status: SHIPPED | OUTPATIENT
Start: 2025-05-05 | End: 2025-05-12

## 2025-05-05 RX ORDER — CIPROFLOXACIN 500 MG/1
500 TABLET, FILM COATED ORAL ONCE
Status: COMPLETED | OUTPATIENT
Start: 2025-05-05 | End: 2025-05-05

## 2025-05-05 RX ORDER — IOPAMIDOL 755 MG/ML
75 INJECTION, SOLUTION INTRAVASCULAR
Status: COMPLETED | OUTPATIENT
Start: 2025-05-05 | End: 2025-05-05

## 2025-05-05 RX ORDER — KETOROLAC TROMETHAMINE 30 MG/ML
15 INJECTION, SOLUTION INTRAMUSCULAR; INTRAVENOUS ONCE
Status: COMPLETED | OUTPATIENT
Start: 2025-05-05 | End: 2025-05-05

## 2025-05-05 RX ORDER — ONDANSETRON 2 MG/ML
4 INJECTION INTRAMUSCULAR; INTRAVENOUS ONCE
Status: COMPLETED | OUTPATIENT
Start: 2025-05-05 | End: 2025-05-05

## 2025-05-05 RX ADMIN — IOPAMIDOL 75 ML: 755 INJECTION, SOLUTION INTRAVENOUS at 19:56

## 2025-05-05 RX ADMIN — KETOROLAC TROMETHAMINE 15 MG: 30 INJECTION, SOLUTION INTRAMUSCULAR at 20:47

## 2025-05-05 RX ADMIN — MORPHINE SULFATE 2 MG: 2 INJECTION, SOLUTION INTRAMUSCULAR; INTRAVENOUS at 18:50

## 2025-05-05 RX ADMIN — CIPROFLOXACIN HYDROCHLORIDE 500 MG: 500 TABLET, FILM COATED ORAL at 20:47

## 2025-05-05 RX ADMIN — ONDANSETRON 4 MG: 2 INJECTION, SOLUTION INTRAMUSCULAR; INTRAVENOUS at 18:51

## 2025-05-05 ASSESSMENT — PAIN - FUNCTIONAL ASSESSMENT: PAIN_FUNCTIONAL_ASSESSMENT: 0-10

## 2025-05-05 ASSESSMENT — PAIN SCALES - GENERAL
PAINLEVEL_OUTOF10: 3
PAINLEVEL_OUTOF10: 10

## 2025-05-05 NOTE — ED PROVIDER NOTES
[facility] EMERGENCY DEPARTMENT  EMERGENCY DEPARTMENT ENCOUNTER        Pt Name: Ruby Pena  MRN: [id number]  Birthdate 1990  Date of evaluation: 5/5/2025  Provider: SCOTTIE Pepe  PCP: Tiki Cartwright APRN - CNP  Note Started: 6:53 PM EDT 5/5/25      MARLO. I have evaluated this patient.        CHIEF COMPLAINT       Chief Complaint   Patient presents with    Flank Pain     Left flank pain x1 week, decreased urine output       HISTORY OF PRESENT ILLNESS: 1 or more Elements     History From: Patient       Ruby Pena is a 34 y.o. female who presents to the emergency department via private vehicle complaining of low back pain and concerns for urinary tract infection.  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  She has a colostomy in place.  She also has a wound VAC for wound ischial wound and follows with wound care.  She states that she has had left flank pain for the past 1 week.  She was seen in the emergency department about 1 month ago and treated for urinary tract infection.  She states she was initially started on 1 antibiotic however called at day or so later and advised to start a new antibiotic because the initial was not strong enough.  She states when she developed her symptoms last week she began taking the original antibiotic again however her symptoms have not improved.  She has felt chilled and feverish but no documented fevers.  She has had some nausea but no vomiting she does admit to significant decrease in appetite.  She denies any dysuria or hematuria.  She is primarily complaining of left-sided flank pain.    Nursing Notes were all reviewed and agreed with or any disagreements were addressed in the HPI.    REVIEW OF SYSTEMS :      Review of Systems    Positives and Pertinent negatives as per HPI.     SURGICAL HISTORY     Past Surgical History:   Procedure Laterality Date    BACLOFEN PUMP IMPLANTATION      COLOSTOMY  03/01/2019

## 2025-05-06 ENCOUNTER — RESULTS FOLLOW-UP (OUTPATIENT)
Dept: EMERGENCY DEPT | Age: 35
End: 2025-05-06

## 2025-05-06 LAB — BACTERIA UR CULT: NORMAL
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
## 2025-07-10 ENCOUNTER — HOSPITAL ENCOUNTER (EMERGENCY)
Age: 35
Discharge: HOME | End: 2025-07-10
Payer: COMMERCIAL

## 2025-07-10 VITALS
TEMPERATURE: 97.7 F | WEIGHT: 89 LBS | RESPIRATION RATE: 16 BRPM | DIASTOLIC BLOOD PRESSURE: 68 MMHG | BODY MASS INDEX: 18.6 KG/M2 | OXYGEN SATURATION: 99 % | HEART RATE: 73 BPM | SYSTOLIC BLOOD PRESSURE: 114 MMHG

## 2025-07-10 DIAGNOSIS — K02.9 DENTAL CARIES: ICD-10-CM

## 2025-07-10 DIAGNOSIS — K04.7 DENTAL INFECTION: Primary | ICD-10-CM

## 2025-07-10 PROCEDURE — 99283 EMERGENCY DEPT VISIT LOW MDM: CPT

## 2025-07-10 RX ORDER — LIDOCAINE HYDROCHLORIDE 20 MG/ML
10 SOLUTION OROPHARYNGEAL
Qty: 400 ML | Refills: 0 | Status: SHIPPED | OUTPATIENT
Start: 2025-07-10 | End: 2025-07-17

## 2025-07-10 RX ORDER — IBUPROFEN 600 MG/1
600 TABLET, FILM COATED ORAL EVERY 6 HOURS PRN
Qty: 30 TABLET | Refills: 0 | Status: SHIPPED | OUTPATIENT
Start: 2025-07-10

## 2025-07-10 RX ORDER — PENICILLIN V POTASSIUM 500 MG/1
500 TABLET, FILM COATED ORAL 4 TIMES DAILY
Qty: 40 TABLET | Refills: 0 | Status: SHIPPED | OUTPATIENT
Start: 2025-07-10 | End: 2025-07-20

## 2025-07-10 RX ORDER — ACETAMINOPHEN 500 MG
1000 TABLET ORAL EVERY 6 HOURS PRN
Qty: 120 TABLET | Refills: 0 | Status: SHIPPED | OUTPATIENT
Start: 2025-07-10

## 2025-07-10 RX ORDER — LIDOCAINE HYDROCHLORIDE 20 MG/ML
10 SOLUTION OROPHARYNGEAL
Qty: 400 ML | Refills: 0 | Status: SHIPPED | OUTPATIENT
Start: 2025-07-10 | End: 2025-07-10

## 2025-07-10 ASSESSMENT — PAIN SCALES - GENERAL: PAINLEVEL_OUTOF10: 9

## 2025-07-10 ASSESSMENT — PAIN - FUNCTIONAL ASSESSMENT: PAIN_FUNCTIONAL_ASSESSMENT: 0-10

## 2025-07-10 ASSESSMENT — PAIN DESCRIPTION - LOCATION: LOCATION: MOUTH
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
## 2025-08-06 ENCOUNTER — HOSPITAL ENCOUNTER (EMERGENCY)
Age: 35
Discharge: HOME | End: 2025-08-06
Payer: COMMERCIAL

## 2025-08-06 VITALS
TEMPERATURE: 98.4 F | DIASTOLIC BLOOD PRESSURE: 83 MMHG | RESPIRATION RATE: 16 BRPM | HEART RATE: 76 BPM | SYSTOLIC BLOOD PRESSURE: 98 MMHG | OXYGEN SATURATION: 100 %

## 2025-08-06 DIAGNOSIS — L23.5 ALLERGIC DERMATITIS DUE TO OTHER CHEMICAL PRODUCT: Primary | ICD-10-CM

## 2025-08-06 PROCEDURE — 6370000000 HC RX 637 (ALT 250 FOR IP): Performed by: PHYSICIAN ASSISTANT

## 2025-08-06 PROCEDURE — 99283 EMERGENCY DEPT VISIT LOW MDM: CPT

## 2025-08-06 RX ORDER — CETIRIZINE HYDROCHLORIDE 10 MG/1
10 TABLET ORAL ONCE
Status: COMPLETED | OUTPATIENT
Start: 2025-08-06 | End: 2025-08-06

## 2025-08-06 RX ORDER — PREDNISONE 20 MG/1
20 TABLET ORAL ONCE
Status: COMPLETED | OUTPATIENT
Start: 2025-08-06 | End: 2025-08-06

## 2025-08-06 RX ORDER — PREDNISONE 10 MG/1
20 TABLET ORAL DAILY
Qty: 10 TABLET | Refills: 0 | Status: SHIPPED | OUTPATIENT
Start: 2025-08-06 | End: 2025-08-11

## 2025-08-06 RX ORDER — CETIRIZINE HYDROCHLORIDE 10 MG/1
10 TABLET ORAL DAILY
Qty: 30 TABLET | Refills: 0 | Status: SHIPPED | OUTPATIENT
Start: 2025-08-06

## 2025-08-06 RX ORDER — TRIAMCINOLONE ACETONIDE 1 MG/G
CREAM TOPICAL
Qty: 80 G | Refills: 0 | Status: SHIPPED | OUTPATIENT
Start: 2025-08-06

## 2025-08-06 RX ADMIN — PREDNISONE 20 MG: 20 TABLET ORAL at 13:56

## 2025-08-06 RX ADMIN — CETIRIZINE HYDROCHLORIDE 10 MG: 10 TABLET ORAL at 13:56

## 2025-08-14 ENCOUNTER — APPOINTMENT (OUTPATIENT)
Dept: CT IMAGING | Age: 35
End: 2025-08-14
Payer: COMMERCIAL

## 2025-08-14 ENCOUNTER — HOSPITAL ENCOUNTER (EMERGENCY)
Age: 35
Discharge: HOME | End: 2025-08-14
Payer: COMMERCIAL

## 2025-08-14 VITALS
DIASTOLIC BLOOD PRESSURE: 64 MMHG | BODY MASS INDEX: 18.89 KG/M2 | RESPIRATION RATE: 15 BRPM | WEIGHT: 90 LBS | HEART RATE: 56 BPM | HEIGHT: 58 IN | OXYGEN SATURATION: 94 % | SYSTOLIC BLOOD PRESSURE: 128 MMHG | TEMPERATURE: 97.7 F

## 2025-08-14 DIAGNOSIS — T83.511A URINARY TRACT INFECTION ASSOCIATED WITH CATHETERIZATION OF URINARY TRACT, UNSPECIFIED INDWELLING URINARY CATHETER TYPE, INITIAL ENCOUNTER: Primary | ICD-10-CM

## 2025-08-14 DIAGNOSIS — N39.0 URINARY TRACT INFECTION ASSOCIATED WITH CATHETERIZATION OF URINARY TRACT, UNSPECIFIED INDWELLING URINARY CATHETER TYPE, INITIAL ENCOUNTER: Primary | ICD-10-CM

## 2025-08-14 DIAGNOSIS — D64.9 ANEMIA, UNSPECIFIED TYPE: ICD-10-CM

## 2025-08-14 LAB
ALBUMIN SERPL-MCNC: 2.9 G/DL (ref 3.4–5)
ALBUMIN/GLOB SERPL: 0.8 {RATIO} (ref 1.1–2.2)
ALP SERPL-CCNC: 57 U/L (ref 40–129)
ALT SERPL-CCNC: 17 U/L (ref 10–40)
ANION GAP SERPL CALCULATED.3IONS-SCNC: 7 MMOL/L (ref 3–16)
AST SERPL-CCNC: 22 U/L (ref 15–37)
BACTERIA URNS QL MICRO: ABNORMAL /HPF
BASOPHILS # BLD: 0.1 K/UL (ref 0–0.2)
BASOPHILS NFR BLD: 0.8 %
BILIRUB SERPL-MCNC: <0.2 MG/DL (ref 0–1)
BILIRUB UR QL STRIP.AUTO: NEGATIVE
BUN SERPL-MCNC: 16 MG/DL (ref 7–20)
CALCIUM SERPL-MCNC: 8.7 MG/DL (ref 8.3–10.6)
CHLORIDE SERPL-SCNC: 106 MMOL/L (ref 99–110)
CLARITY UR: CLEAR
CO2 SERPL-SCNC: 24 MMOL/L (ref 21–32)
COLOR UR: YELLOW
CREAT SERPL-MCNC: 0.4 MG/DL (ref 0.6–1.1)
DEPRECATED RDW RBC AUTO: 19.3 % (ref 12.4–15.4)
EOSINOPHIL # BLD: 0.5 K/UL (ref 0–0.6)
EOSINOPHIL NFR BLD: 4.8 %
EPI CELLS #/AREA URNS HPF: ABNORMAL /HPF (ref 0–5)
GFR SERPLBLD CREATININE-BSD FMLA CKD-EPI: >90 ML/MIN/{1.73_M2}
GLUCOSE SERPL-MCNC: 98 MG/DL (ref 70–99)
GLUCOSE UR STRIP.AUTO-MCNC: NEGATIVE MG/DL
HCG UR QL: NEGATIVE
HCT VFR BLD AUTO: 24.7 % (ref 36–48)
HGB BLD-MCNC: 7.8 G/DL (ref 12–16)
HGB UR QL STRIP.AUTO: NEGATIVE
KETONES UR STRIP.AUTO-MCNC: NEGATIVE MG/DL
LACTATE BLDV-SCNC: 1.1 MMOL/L (ref 0.4–1.9)
LEUKOCYTE ESTERASE UR QL STRIP.AUTO: ABNORMAL
LIPASE SERPL-CCNC: 41 U/L (ref 13–60)
LYMPHOCYTES # BLD: 1.9 K/UL (ref 1–5.1)
LYMPHOCYTES NFR BLD: 17.4 %
MCH RBC QN AUTO: 26.7 PG (ref 26–34)
MCHC RBC AUTO-ENTMCNC: 31.5 G/DL (ref 31–36)
MCV RBC AUTO: 84.6 FL (ref 80–100)
MONOCYTES # BLD: 0.8 K/UL (ref 0–1.3)
MONOCYTES NFR BLD: 7.3 %
NEUTROPHILS # BLD: 7.5 K/UL (ref 1.7–7.7)
NEUTROPHILS NFR BLD: 69.7 %
NITRITE UR QL STRIP.AUTO: POSITIVE
NT-PROBNP SERPL-MCNC: 93 PG/ML (ref 0–124)
PH UR STRIP.AUTO: 6 [PH] (ref 5–8)
PLATELET # BLD AUTO: 270 K/UL (ref 135–450)
PMV BLD AUTO: 9.1 FL (ref 5–10.5)
POTASSIUM SERPL-SCNC: 4.6 MMOL/L (ref 3.5–5.1)
PROT SERPL-MCNC: 6.7 G/DL (ref 6.4–8.2)
PROT UR STRIP.AUTO-MCNC: NEGATIVE MG/DL
RBC # BLD AUTO: 2.92 M/UL (ref 4–5.2)
RBC #/AREA URNS HPF: ABNORMAL /HPF (ref 0–4)
REASON FOR REJECTION: NORMAL
REJECTED TEST: NORMAL
SODIUM SERPL-SCNC: 137 MMOL/L (ref 136–145)
SP GR UR STRIP.AUTO: 1.01 (ref 1–1.03)
UA DIPSTICK W REFLEX MICRO PNL UR: YES
URN SPEC COLLECT METH UR: ABNORMAL
UROBILINOGEN UR STRIP-ACNC: 0.2 E.U./DL
WBC # BLD AUTO: 10.7 K/UL (ref 4–11)
WBC #/AREA URNS HPF: ABNORMAL /HPF (ref 0–5)

## 2025-08-14 PROCEDURE — 6360000002 HC RX W HCPCS: Performed by: PHYSICIAN ASSISTANT

## 2025-08-14 PROCEDURE — 2580000003 HC RX 258: Performed by: PHYSICIAN ASSISTANT

## 2025-08-14 PROCEDURE — 87077 CULTURE AEROBIC IDENTIFY: CPT

## 2025-08-14 PROCEDURE — 6360000004 HC RX CONTRAST MEDICATION: Performed by: PHYSICIAN ASSISTANT

## 2025-08-14 PROCEDURE — 6370000000 HC RX 637 (ALT 250 FOR IP): Performed by: PHYSICIAN ASSISTANT

## 2025-08-14 PROCEDURE — 83690 ASSAY OF LIPASE: CPT

## 2025-08-14 PROCEDURE — 87186 SC STD MICRODIL/AGAR DIL: CPT

## 2025-08-14 PROCEDURE — 85025 COMPLETE CBC W/AUTO DIFF WBC: CPT

## 2025-08-14 PROCEDURE — 96375 TX/PRO/DX INJ NEW DRUG ADDON: CPT

## 2025-08-14 PROCEDURE — 99285 EMERGENCY DEPT VISIT HI MDM: CPT

## 2025-08-14 PROCEDURE — 74177 CT ABD & PELVIS W/CONTRAST: CPT

## 2025-08-14 PROCEDURE — 80053 COMPREHEN METABOLIC PANEL: CPT

## 2025-08-14 PROCEDURE — 96361 HYDRATE IV INFUSION ADD-ON: CPT

## 2025-08-14 PROCEDURE — 83605 ASSAY OF LACTIC ACID: CPT

## 2025-08-14 PROCEDURE — 84703 CHORIONIC GONADOTROPIN ASSAY: CPT

## 2025-08-14 PROCEDURE — 96365 THER/PROPH/DIAG IV INF INIT: CPT

## 2025-08-14 PROCEDURE — 81001 URINALYSIS AUTO W/SCOPE: CPT

## 2025-08-14 PROCEDURE — 87086 URINE CULTURE/COLONY COUNT: CPT

## 2025-08-14 PROCEDURE — 83880 ASSAY OF NATRIURETIC PEPTIDE: CPT

## 2025-08-14 PROCEDURE — 87040 BLOOD CULTURE FOR BACTERIA: CPT

## 2025-08-14 RX ORDER — IOPAMIDOL 755 MG/ML
75 INJECTION, SOLUTION INTRAVASCULAR
Status: COMPLETED | OUTPATIENT
Start: 2025-08-14 | End: 2025-08-14

## 2025-08-14 RX ORDER — CIPROFLOXACIN 2 MG/ML
400 INJECTION, SOLUTION INTRAVENOUS ONCE
Status: COMPLETED | OUTPATIENT
Start: 2025-08-14 | End: 2025-08-14

## 2025-08-14 RX ORDER — OXYCODONE AND ACETAMINOPHEN 5; 325 MG/1; MG/1
1 TABLET ORAL ONCE
Refills: 0 | Status: COMPLETED | OUTPATIENT
Start: 2025-08-14 | End: 2025-08-14

## 2025-08-14 RX ORDER — KETOROLAC TROMETHAMINE 30 MG/ML
15 INJECTION, SOLUTION INTRAMUSCULAR; INTRAVENOUS ONCE
Status: COMPLETED | OUTPATIENT
Start: 2025-08-14 | End: 2025-08-14

## 2025-08-14 RX ORDER — 0.9 % SODIUM CHLORIDE 0.9 %
1000 INTRAVENOUS SOLUTION INTRAVENOUS ONCE
Status: COMPLETED | OUTPATIENT
Start: 2025-08-14 | End: 2025-08-14

## 2025-08-14 RX ORDER — ONDANSETRON 2 MG/ML
4 INJECTION INTRAMUSCULAR; INTRAVENOUS ONCE
Status: COMPLETED | OUTPATIENT
Start: 2025-08-14 | End: 2025-08-14

## 2025-08-14 RX ORDER — CIPROFLOXACIN 500 MG/1
500 TABLET, FILM COATED ORAL 2 TIMES DAILY
Qty: 14 TABLET | Refills: 0 | Status: SHIPPED | OUTPATIENT
Start: 2025-08-14 | End: 2025-08-21

## 2025-08-14 RX ADMIN — ONDANSETRON 4 MG: 2 INJECTION, SOLUTION INTRAMUSCULAR; INTRAVENOUS at 11:29

## 2025-08-14 RX ADMIN — OXYCODONE AND ACETAMINOPHEN 1 TABLET: 5; 325 TABLET ORAL at 13:27

## 2025-08-14 RX ADMIN — OXYCODONE AND ACETAMINOPHEN 1 TABLET: 5; 325 TABLET ORAL at 10:05

## 2025-08-14 RX ADMIN — CIPROFLOXACIN 400 MG: 400 INJECTION, SOLUTION INTRAVENOUS at 12:14

## 2025-08-14 RX ADMIN — IOPAMIDOL 75 ML: 755 INJECTION, SOLUTION INTRAVENOUS at 11:49

## 2025-08-14 RX ADMIN — SODIUM CHLORIDE 1000 ML: 0.9 INJECTION, SOLUTION INTRAVENOUS at 11:34

## 2025-08-14 RX ADMIN — KETOROLAC TROMETHAMINE 15 MG: 30 INJECTION, SOLUTION INTRAMUSCULAR at 10:06

## 2025-08-14 ASSESSMENT — PAIN - FUNCTIONAL ASSESSMENT: PAIN_FUNCTIONAL_ASSESSMENT: 0-10

## 2025-08-14 ASSESSMENT — PAIN SCALES - GENERAL: PAINLEVEL_OUTOF10: 10

## 2025-08-15 LAB
BACTERIA BLD CULT ORG #2: NORMAL
BACTERIA BLD CULT: NORMAL

## 2025-08-16 LAB
BACTERIA UR CULT: ABNORMAL
BACTERIA UR CULT: ABNORMAL
ORGANISM: ABNORMAL
ORGANISM: ABNORMAL

## 2025-08-18 LAB
BACTERIA BLD CULT ORG #2: NORMAL
BACTERIA BLD CULT: NORMAL

## (undated) DEVICE — SUTURE MCRYL + SZ 4-0 L18IN ABSRB UD L19MM PS-2 3/8 CIR MCP496G

## (undated) DEVICE — SMARTGOWN SURGICAL GOWN, LARGE: Brand: CONVERTORS

## (undated) DEVICE — ENDOSCOPIC KIT 2 12 FT OP4 DE2 GWN SYR

## (undated) DEVICE — DRESSING FOAM W4XL10IN POLYUR SAFETAC MULTILAYERED ABSRB PD

## (undated) DEVICE — DRAIN SURG 15FR L3/16IN DIA4.7MM SIL CHN RND HUBLESS FULL

## (undated) DEVICE — SUTURE PDS II SZ 0 L60IN ABSRB VLT L48MM CTX 1/2 CIR Z990G

## (undated) DEVICE — GAUZE,SPONGE,2"X2",8PLY,STERILE,LF,2'S: Brand: MEDLINE

## (undated) DEVICE — GLOVE,SURG,SENSICARE SLT,LF,PF,7: Brand: MEDLINE

## (undated) DEVICE — SUTURE PROL SZ 2-0 L30IN NONABSORBABLE BLU L26MM SH 1/2 CIR 8833H

## (undated) DEVICE — CANNULA,OXY,ADULT,SUPERSOFT,W/7'TUB,SC: Brand: MEDLINE INDUSTRIES, INC.

## (undated) DEVICE — TOTAL TRAY, DB, 100% SILI FOLEY, 16FR 10: Brand: MEDLINE

## (undated) DEVICE — Device

## (undated) DEVICE — COLOSTOMY/ILEOSTOMY KIT,FLEXWEAR: Brand: NEW IMAGE

## (undated) DEVICE — YANKAUER,BULB TIP,W/O VENT,RIGID,STERILE: Brand: MEDLINE

## (undated) DEVICE — DRESSING TRNSPAR W5XL4.5IN FLM SHT SEMIPERMEABLE WIND

## (undated) DEVICE — FORCEP BX STD CAP 240CM RAD JAW 4

## (undated) DEVICE — SUTURE PERMAHAND SZ 3-0 L18IN NONABSORBABLE BLK L26MM SH C013D

## (undated) DEVICE — CONMED SCOPE SAVER BITE BLOCK, 20X27 MM: Brand: SCOPE SAVER

## (undated) DEVICE — Z DISCONTINUED USE 2716239 STAPLER INT STPL 51MM CUT LN L40MM STD TISS CRV CUT CR40B

## (undated) DEVICE — SUTURE VCRL + SZ 3-0 L18IN ABSRB UD SH 1/2 CIR TAPERCUT NDL VCP864D

## (undated) DEVICE — 3M™ TEGADERM™ TRANSPARENT FILM DRESSING FRAME STYLE WITH BORDER, 1616, 4 IN X 4-3/4 IN (10 CM X 12 CM), 50/CT 4CT/CASE: Brand: 3M™ TEGADERM™

## (undated) DEVICE — SEALER TISS L20CM DIA13MM ADV BPLR L CRV JAW OPN APPRCH

## (undated) DEVICE — SUTURE ETHLN SZ 2-0 L20IN NONABSORBABLE BLK LR L75MM 3/8 470G

## (undated) DEVICE — SOLUTION IV IRRIG POUR BRL 0.9% SODIUM CHL 2F7124

## (undated) DEVICE — STAPLER INT RELD REG 60 MM VERY THCK TISS 2 ROW 4FIRING PROX

## (undated) DEVICE — LAP SPONGE  18 X 18 IN. PREWASHED SOFTPACK X-RAY DETECTABLE: Brand: CARDINAL HEALTH

## (undated) DEVICE — GAUZE,SPONGE,4"X4",8PLY,STRL,LF,10/TRAY: Brand: MEDLINE